# Patient Record
Sex: FEMALE | Race: WHITE | NOT HISPANIC OR LATINO | Employment: OTHER | URBAN - METROPOLITAN AREA
[De-identification: names, ages, dates, MRNs, and addresses within clinical notes are randomized per-mention and may not be internally consistent; named-entity substitution may affect disease eponyms.]

---

## 2020-12-24 ENCOUNTER — APPOINTMENT (EMERGENCY)
Dept: CT IMAGING | Facility: HOSPITAL | Age: 80
DRG: 871 | End: 2020-12-24
Payer: MEDICARE

## 2020-12-24 ENCOUNTER — APPOINTMENT (EMERGENCY)
Dept: RADIOLOGY | Facility: HOSPITAL | Age: 80
DRG: 871 | End: 2020-12-24
Payer: MEDICARE

## 2020-12-24 ENCOUNTER — HOSPITAL ENCOUNTER (INPATIENT)
Facility: HOSPITAL | Age: 80
LOS: 12 days | Discharge: NURSING, REHAB OR LONG-TERM CARE | DRG: 871 | End: 2021-01-05
Attending: EMERGENCY MEDICINE | Admitting: INTERNAL MEDICINE
Payer: MEDICARE

## 2020-12-24 DIAGNOSIS — Z16.12 UTI DUE TO EXTENDED-SPECTRUM BETA LACTAMASE (ESBL) PRODUCING ESCHERICHIA COLI: ICD-10-CM

## 2020-12-24 DIAGNOSIS — M62.82 RHABDOMYOLYSIS: ICD-10-CM

## 2020-12-24 DIAGNOSIS — J12.82 PNEUMONIA DUE TO COVID-19 VIRUS: ICD-10-CM

## 2020-12-24 DIAGNOSIS — N17.9 AKI (ACUTE KIDNEY INJURY) (HCC): ICD-10-CM

## 2020-12-24 DIAGNOSIS — B96.29 UTI DUE TO EXTENDED-SPECTRUM BETA LACTAMASE (ESBL) PRODUCING ESCHERICHIA COLI: ICD-10-CM

## 2020-12-24 DIAGNOSIS — N17.9 ACUTE RENAL FAILURE (ARF) (HCC): ICD-10-CM

## 2020-12-24 DIAGNOSIS — E86.0 SEVERE DEHYDRATION: ICD-10-CM

## 2020-12-24 DIAGNOSIS — U07.1 COVID-19: ICD-10-CM

## 2020-12-24 DIAGNOSIS — E87.3 RESPIRATORY ALKALOSIS: Primary | ICD-10-CM

## 2020-12-24 DIAGNOSIS — N39.0 UTI DUE TO EXTENDED-SPECTRUM BETA LACTAMASE (ESBL) PRODUCING ESCHERICHIA COLI: ICD-10-CM

## 2020-12-24 DIAGNOSIS — E87.8 HYPERCHLOREMIA: ICD-10-CM

## 2020-12-24 DIAGNOSIS — R09.02 HYPOXIA: ICD-10-CM

## 2020-12-24 DIAGNOSIS — F02.80 ALZHEIMER'S DISEASE (HCC): ICD-10-CM

## 2020-12-24 DIAGNOSIS — E83.41 HYPERMAGNESEMIA: ICD-10-CM

## 2020-12-24 DIAGNOSIS — E87.0 HYPERNATREMIA: ICD-10-CM

## 2020-12-24 DIAGNOSIS — U07.1 PNEUMONIA DUE TO COVID-19 VIRUS: ICD-10-CM

## 2020-12-24 DIAGNOSIS — G30.9 ALZHEIMER'S DISEASE (HCC): ICD-10-CM

## 2020-12-24 PROBLEM — I10 HYPERTENSION: Status: ACTIVE | Noted: 2020-12-24

## 2020-12-24 PROBLEM — J96.01 ACUTE RESPIRATORY FAILURE WITH HYPOXIA (HCC): Status: ACTIVE | Noted: 2020-12-24

## 2020-12-24 PROBLEM — G93.41 METABOLIC ENCEPHALOPATHY: Status: ACTIVE | Noted: 2020-12-24

## 2020-12-24 LAB
ABO GROUP BLD: NORMAL
ABO GROUP BLD: NORMAL
ALBUMIN SERPL BCP-MCNC: 4.1 G/DL (ref 3.4–4.8)
ALP SERPL-CCNC: 62.5 U/L (ref 35–140)
ALT SERPL W P-5'-P-CCNC: 36 U/L (ref 5–54)
ANION GAP SERPL CALCULATED.3IONS-SCNC: 14 MMOL/L (ref 4–13)
ANION GAP SERPL CALCULATED.3IONS-SCNC: 17 MMOL/L (ref 4–13)
AST SERPL W P-5'-P-CCNC: 49 U/L (ref 15–41)
BASE EXCESS BLDA CALC-SCNC: 0 MMOL/L (ref -2–3)
BASOPHILS # BLD MANUAL: 0 THOUSAND/UL (ref 0–0.1)
BASOPHILS NFR MAR MANUAL: 0 % (ref 0–1)
BILIRUB SERPL-MCNC: 0.49 MG/DL (ref 0.3–1.2)
BLD GP AB SCN SERPL QL: NEGATIVE
BNP SERPL-MCNC: 22.8 PG/ML (ref 1–100)
BUN SERPL-MCNC: 178 MG/DL (ref 6–20)
BUN SERPL-MCNC: 182 MG/DL (ref 6–20)
CA-I BLD-SCNC: 1.03 MMOL/L (ref 1.12–1.32)
CALCIUM SERPL-MCNC: 8.6 MG/DL (ref 8.4–10.2)
CALCIUM SERPL-MCNC: 9.4 MG/DL (ref 8.4–10.2)
CHLORIDE SERPL-SCNC: 111 MMOL/L (ref 96–108)
CHLORIDE SERPL-SCNC: 116 MMOL/L (ref 96–108)
CK MB SERPL-MCNC: 2.9 NG/ML (ref 0.1–5.9)
CK MB SERPL-MCNC: <1 % (ref 0–2.5)
CK SERPL-CCNC: 1186.2 U/L (ref 38–234)
CO2 SERPL-SCNC: 24 MMOL/L (ref 22–33)
CO2 SERPL-SCNC: 24 MMOL/L (ref 22–33)
CREAT SERPL-MCNC: 3.93 MG/DL (ref 0.4–1.1)
CREAT SERPL-MCNC: 4.2 MG/DL (ref 0.4–1.1)
CRP SERPL QL: 20 MG/L (ref 0–1)
D DIMER PPP FEU-MCNC: 11.92 MG/L FEU (ref 0.19–0.49)
EOSINOPHIL # BLD MANUAL: 0 THOUSAND/UL (ref 0–0.4)
EOSINOPHIL NFR BLD MANUAL: 0 % (ref 0–6)
ERYTHROCYTE [DISTWIDTH] IN BLOOD BY AUTOMATED COUNT: 14.8 % (ref 11.6–15.1)
GFR SERPL CREATININE-BSD FRML MDRD: 10 ML/MIN/1.73SQ M
GFR SERPL CREATININE-BSD FRML MDRD: 10 ML/MIN/1.73SQ M
GLUCOSE SERPL-MCNC: 122 MG/DL (ref 65–140)
GLUCOSE SERPL-MCNC: 135 MG/DL (ref 65–140)
GLUCOSE SERPL-MCNC: 142 MG/DL (ref 65–140)
GLUCOSE SERPL-MCNC: 143 MG/DL (ref 65–140)
HCO3 BLDA-SCNC: 23.2 MMOL/L (ref 24–30)
HCT VFR BLD AUTO: 51.6 % (ref 34.8–46.1)
HCT VFR BLD CALC: 48 % (ref 34.8–46.1)
HGB BLD-MCNC: 16.1 G/DL (ref 11.5–15.4)
HGB BLDA-MCNC: 16.3 G/DL (ref 11.5–15.4)
LACTATE SERPL-SCNC: 1.4 MMOL/L (ref 0–2)
LYMPHOCYTES # BLD AUTO: 0.9 THOUSAND/UL (ref 0.6–4.47)
LYMPHOCYTES # BLD AUTO: 10 % (ref 14–44)
MAGNESIUM SERPL-MCNC: 4.2 MG/DL (ref 1.6–2.6)
MCH RBC QN AUTO: 27 PG (ref 26.8–34.3)
MCHC RBC AUTO-ENTMCNC: 31.2 G/DL (ref 31.4–37.4)
MCV RBC AUTO: 86 FL (ref 82–98)
MONOCYTES # BLD AUTO: 0.45 THOUSAND/UL (ref 0–1.22)
MONOCYTES NFR BLD: 5 % (ref 4–12)
NEUTROPHILS # BLD MANUAL: 7.64 THOUSAND/UL (ref 1.85–7.62)
NEUTS BAND NFR BLD MANUAL: 22 % (ref 0–8)
NEUTS SEG NFR BLD AUTO: 63 % (ref 43–75)
PCO2 BLD: 24 MMOL/L (ref 21–32)
PCO2 BLD: 34.2 MM HG (ref 42–50)
PH BLD: 7.44 [PH] (ref 7.3–7.4)
PLATELET # BLD AUTO: 392 THOUSANDS/UL (ref 149–390)
PLATELET BLD QL SMEAR: ABNORMAL
PMV BLD AUTO: 10.5 FL (ref 8.9–12.7)
PO2 BLD: 40 MM HG (ref 35–45)
POTASSIUM BLD-SCNC: 5.1 MMOL/L (ref 3.5–5.3)
POTASSIUM SERPL-SCNC: 4.3 MMOL/L (ref 3.5–5)
POTASSIUM SERPL-SCNC: 4.5 MMOL/L (ref 3.5–5)
PROT SERPL-MCNC: 9.1 G/DL (ref 6.4–8.3)
RBC # BLD AUTO: 5.97 MILLION/UL (ref 3.81–5.12)
RBC MORPH BLD: NORMAL
RH BLD: POSITIVE
RH BLD: POSITIVE
SAO2 % BLD FROM PO2: 78 % (ref 60–85)
SODIUM BLD-SCNC: 151 MMOL/L (ref 136–145)
SODIUM SERPL-SCNC: 152 MMOL/L (ref 133–145)
SODIUM SERPL-SCNC: 154 MMOL/L (ref 133–145)
SPECIMEN EXPIRATION DATE: NORMAL
SPECIMEN SOURCE: ABNORMAL
TOTAL CELLS COUNTED SPEC: 100
TOXIC GRANULES BLD QL SMEAR: PRESENT
TROPONIN I SERPL-MCNC: 0.06 NG/ML (ref 0–0.07)
WBC # BLD AUTO: 8.99 THOUSAND/UL (ref 4.31–10.16)

## 2020-12-24 PROCEDURE — 81001 URINALYSIS AUTO W/SCOPE: CPT | Performed by: INTERNAL MEDICINE

## 2020-12-24 PROCEDURE — 82550 ASSAY OF CK (CPK): CPT | Performed by: PHYSICIAN ASSISTANT

## 2020-12-24 PROCEDURE — 93005 ELECTROCARDIOGRAM TRACING: CPT

## 2020-12-24 PROCEDURE — G1004 CDSM NDSC: HCPCS

## 2020-12-24 PROCEDURE — 85379 FIBRIN DEGRADATION QUANT: CPT | Performed by: INTERNAL MEDICINE

## 2020-12-24 PROCEDURE — 85014 HEMATOCRIT: CPT

## 2020-12-24 PROCEDURE — 86140 C-REACTIVE PROTEIN: CPT | Performed by: PHYSICIAN ASSISTANT

## 2020-12-24 PROCEDURE — 85027 COMPLETE CBC AUTOMATED: CPT | Performed by: PHYSICIAN ASSISTANT

## 2020-12-24 PROCEDURE — 86850 RBC ANTIBODY SCREEN: CPT | Performed by: INTERNAL MEDICINE

## 2020-12-24 PROCEDURE — 80048 BASIC METABOLIC PNL TOTAL CA: CPT | Performed by: INTERNAL MEDICINE

## 2020-12-24 PROCEDURE — 71045 X-RAY EXAM CHEST 1 VIEW: CPT

## 2020-12-24 PROCEDURE — 82948 REAGENT STRIP/BLOOD GLUCOSE: CPT

## 2020-12-24 PROCEDURE — 80053 COMPREHEN METABOLIC PANEL: CPT | Performed by: PHYSICIAN ASSISTANT

## 2020-12-24 PROCEDURE — 99285 EMERGENCY DEPT VISIT HI MDM: CPT

## 2020-12-24 PROCEDURE — 83735 ASSAY OF MAGNESIUM: CPT | Performed by: PHYSICIAN ASSISTANT

## 2020-12-24 PROCEDURE — 96360 HYDRATION IV INFUSION INIT: CPT

## 2020-12-24 PROCEDURE — 82330 ASSAY OF CALCIUM: CPT

## 2020-12-24 PROCEDURE — XW13325 TRANSFUSION OF CONVALESCENT PLASMA (NONAUTOLOGOUS) INTO PERIPHERAL VEIN, PERCUTANEOUS APPROACH, NEW TECHNOLOGY GROUP 5: ICD-10-PCS | Performed by: INTERNAL MEDICINE

## 2020-12-24 PROCEDURE — 84132 ASSAY OF SERUM POTASSIUM: CPT

## 2020-12-24 PROCEDURE — 85007 BL SMEAR W/DIFF WBC COUNT: CPT | Performed by: PHYSICIAN ASSISTANT

## 2020-12-24 PROCEDURE — 82607 VITAMIN B-12: CPT | Performed by: INTERNAL MEDICINE

## 2020-12-24 PROCEDURE — 84484 ASSAY OF TROPONIN QUANT: CPT | Performed by: PHYSICIAN ASSISTANT

## 2020-12-24 PROCEDURE — 36415 COLL VENOUS BLD VENIPUNCTURE: CPT | Performed by: PHYSICIAN ASSISTANT

## 2020-12-24 PROCEDURE — 70450 CT HEAD/BRAIN W/O DYE: CPT

## 2020-12-24 PROCEDURE — 84295 ASSAY OF SERUM SODIUM: CPT

## 2020-12-24 PROCEDURE — 82803 BLOOD GASES ANY COMBINATION: CPT

## 2020-12-24 PROCEDURE — 82553 CREATINE MB FRACTION: CPT | Performed by: PHYSICIAN ASSISTANT

## 2020-12-24 PROCEDURE — 87040 BLOOD CULTURE FOR BACTERIA: CPT | Performed by: PHYSICIAN ASSISTANT

## 2020-12-24 PROCEDURE — 86901 BLOOD TYPING SEROLOGIC RH(D): CPT | Performed by: INTERNAL MEDICINE

## 2020-12-24 PROCEDURE — 86900 BLOOD TYPING SEROLOGIC ABO: CPT | Performed by: INTERNAL MEDICINE

## 2020-12-24 PROCEDURE — 83880 ASSAY OF NATRIURETIC PEPTIDE: CPT | Performed by: PHYSICIAN ASSISTANT

## 2020-12-24 PROCEDURE — 87081 CULTURE SCREEN ONLY: CPT | Performed by: INTERNAL MEDICINE

## 2020-12-24 PROCEDURE — 96361 HYDRATE IV INFUSION ADD-ON: CPT

## 2020-12-24 PROCEDURE — 99285 EMERGENCY DEPT VISIT HI MDM: CPT | Performed by: PHYSICIAN ASSISTANT

## 2020-12-24 PROCEDURE — 99223 1ST HOSP IP/OBS HIGH 75: CPT | Performed by: INTERNAL MEDICINE

## 2020-12-24 PROCEDURE — 82947 ASSAY GLUCOSE BLOOD QUANT: CPT

## 2020-12-24 PROCEDURE — 83605 ASSAY OF LACTIC ACID: CPT | Performed by: PHYSICIAN ASSISTANT

## 2020-12-24 RX ORDER — DEXAMETHASONE SODIUM PHOSPHATE 4 MG/ML
6 INJECTION, SOLUTION INTRA-ARTICULAR; INTRALESIONAL; INTRAMUSCULAR; INTRAVENOUS; SOFT TISSUE EVERY 24 HOURS
Status: COMPLETED | OUTPATIENT
Start: 2020-12-24 | End: 2021-01-02

## 2020-12-24 RX ORDER — MULTIVITAMIN/IRON/FOLIC ACID 18MG-0.4MG
1 TABLET ORAL DAILY
Status: DISCONTINUED | OUTPATIENT
Start: 2020-12-31 | End: 2021-01-05 | Stop reason: HOSPADM

## 2020-12-24 RX ORDER — ZINC SULFATE 50(220)MG
220 CAPSULE ORAL DAILY
Status: DISPENSED | OUTPATIENT
Start: 2020-12-24 | End: 2020-12-31

## 2020-12-24 RX ORDER — ONDANSETRON 2 MG/ML
4 INJECTION INTRAMUSCULAR; INTRAVENOUS EVERY 6 HOURS PRN
Status: DISCONTINUED | OUTPATIENT
Start: 2020-12-24 | End: 2021-01-05 | Stop reason: HOSPADM

## 2020-12-24 RX ORDER — FAMOTIDINE 20 MG/1
20 TABLET, FILM COATED ORAL 2 TIMES DAILY
Status: DISCONTINUED | OUTPATIENT
Start: 2020-12-24 | End: 2021-01-05 | Stop reason: HOSPADM

## 2020-12-24 RX ORDER — MAGNESIUM HYDROXIDE/ALUMINUM HYDROXICE/SIMETHICONE 120; 1200; 1200 MG/30ML; MG/30ML; MG/30ML
30 SUSPENSION ORAL EVERY 6 HOURS PRN
Status: DISCONTINUED | OUTPATIENT
Start: 2020-12-24 | End: 2021-01-05 | Stop reason: HOSPADM

## 2020-12-24 RX ORDER — ACETAMINOPHEN 650 MG/1
650 SUPPOSITORY RECTAL ONCE
Status: COMPLETED | OUTPATIENT
Start: 2020-12-24 | End: 2020-12-24

## 2020-12-24 RX ORDER — DEXTROSE MONOHYDRATE 50 MG/ML
200 INJECTION, SOLUTION INTRAVENOUS CONTINUOUS
Status: DISCONTINUED | OUTPATIENT
Start: 2020-12-24 | End: 2020-12-25

## 2020-12-24 RX ORDER — BISACODYL 10 MG
10 SUPPOSITORY, RECTAL RECTAL DAILY PRN
Status: DISCONTINUED | OUTPATIENT
Start: 2020-12-24 | End: 2021-01-05 | Stop reason: HOSPADM

## 2020-12-24 RX ORDER — ATORVASTATIN CALCIUM 40 MG/1
40 TABLET, FILM COATED ORAL
Status: DISCONTINUED | OUTPATIENT
Start: 2020-12-24 | End: 2020-12-25

## 2020-12-24 RX ORDER — CEFTRIAXONE 1 G/50ML
1000 INJECTION, SOLUTION INTRAVENOUS EVERY 24 HOURS
Status: DISCONTINUED | OUTPATIENT
Start: 2020-12-24 | End: 2020-12-28

## 2020-12-24 RX ORDER — SODIUM CHLORIDE 9 MG/ML
125 INJECTION, SOLUTION INTRAVENOUS CONTINUOUS
Status: DISCONTINUED | OUTPATIENT
Start: 2020-12-24 | End: 2020-12-25

## 2020-12-24 RX ORDER — HEPARIN SODIUM 5000 [USP'U]/ML
5000 INJECTION, SOLUTION INTRAVENOUS; SUBCUTANEOUS EVERY 8 HOURS SCHEDULED
Status: DISCONTINUED | OUTPATIENT
Start: 2020-12-24 | End: 2020-12-29

## 2020-12-24 RX ORDER — ACETAMINOPHEN 325 MG/1
650 TABLET ORAL EVERY 6 HOURS PRN
Status: DISCONTINUED | OUTPATIENT
Start: 2020-12-24 | End: 2021-01-05 | Stop reason: HOSPADM

## 2020-12-24 RX ORDER — DOXYCYCLINE HYCLATE 100 MG/1
100 CAPSULE ORAL EVERY 12 HOURS
Status: DISCONTINUED | OUTPATIENT
Start: 2020-12-24 | End: 2020-12-26

## 2020-12-24 RX ORDER — MELATONIN
2000 DAILY
Status: DISCONTINUED | OUTPATIENT
Start: 2020-12-24 | End: 2021-01-05 | Stop reason: HOSPADM

## 2020-12-24 RX ORDER — ASCORBIC ACID 500 MG
1000 TABLET ORAL EVERY 12 HOURS SCHEDULED
Status: DISPENSED | OUTPATIENT
Start: 2020-12-24 | End: 2020-12-31

## 2020-12-24 RX ADMIN — HEPARIN SODIUM 5000 UNITS: 5000 INJECTION INTRAVENOUS; SUBCUTANEOUS at 18:29

## 2020-12-24 RX ADMIN — SODIUM CHLORIDE 1000 ML: 0.9 INJECTION, SOLUTION INTRAVENOUS at 12:51

## 2020-12-24 RX ADMIN — ACETAMINOPHEN 650 MG: 650 SUPPOSITORY RECTAL at 13:34

## 2020-12-24 RX ADMIN — SODIUM CHLORIDE 125 ML/HR: 0.9 INJECTION, SOLUTION INTRAVENOUS at 13:36

## 2020-12-24 RX ADMIN — DEXTROSE 125 ML/HR: 5 SOLUTION INTRAVENOUS at 18:29

## 2020-12-24 RX ADMIN — DEXAMETHASONE SODIUM PHOSPHATE 6 MG: 4 INJECTION, SOLUTION INTRAMUSCULAR; INTRAVENOUS at 18:29

## 2020-12-24 RX ADMIN — CEFTRIAXONE 1000 MG: 1 INJECTION, SOLUTION INTRAVENOUS at 18:30

## 2020-12-24 NOTE — ASSESSMENT & PLAN NOTE
Sodium is 152 secondary to dehydration  Continue IV fluids D5 water at 125 cc/hour  Free water deficit is around 3 L

## 2020-12-24 NOTE — ASSESSMENT & PLAN NOTE
Nursing home staff told that patient has not been eating for couple of days  She is not drinking enough  Patient has no previous known kidney disease  Today creatinine is 4  Continue IV fluids  Check PVR  Follow urinalysis

## 2020-12-24 NOTE — H&P
H&P- Onelia Samuels 1940, 68 y o  female MRN: [id number]    Unit/Bed#: ED 12 Encounter: [id number]    Primary Care Provider: No primary care provider on file  Date and time admitted to hospital: 12/24/2020 12:01 PM    Acute respiratory failure with hypoxia [facility])  Assessment & Plan  Patient got diagnosed with COVID-19 on December 14, 2020  After that she was started on multivitamins and Decadron in [facility]  Patient got worse over time  For past few days she is not eating or drinking anything  Her oxygen saturations dropped  That said patient was sent to the hospital   Patient does not use oxygen at baseline  Now she is requiring 6 L of oxygen to maintain saturation  Patient is in acute hypoxic respiratory failure secondary to COVID-19 pneumonia  * Pneumonia due to COVID-19 virus  Assessment & Plan  Patient was found COVID positive on December 14, 2020  She was started with Decadron and multivitamins at nursing home  Her conditions got worse  Today she was found to have low oxygen saturation  Nursing home staff told that patient is not eating or drinking anything for couple of days  Chest x-ray is evident for pneumonia  Patient is requiring 6 L of oxygen  Will start on moderate COVID-19 positive  Start on antibiotics, anticoagulation, steroids, multivitamins  Patient has severe LARA  Hold remdisivir  Self Proning  We talked to son in detail, he want patient to be full code  Continue to monitor closely      LARA (acute kidney injury) [facility])  Assessment & Plan  Nursing home staff told that patient has not been eating for couple of days  She is not drinking enough  Patient has no previous known kidney disease  Today creatinine is 4  Continue IV fluids  Check PVR  Follow urinalysis  Hypernatremia  Assessment & Plan  Sodium is 152 secondary to dehydration  Continue IV fluids D5 water at 125 cc/hour  Free water deficit is around 3 L       Rhabdomyolysis  Assessment & Plan  Patient had elevated CK 1186  Continue IV fluids  Metabolic encephalopathy  Assessment & Plan  Patient has hypernatremia, acute kidney injury, leading to metabolic encephalopathy  At baseline patient is alert awake oriented x3  Today patient is nonverbal, she is awake but disoriented  CT of the head is negative  Alzheimer's disease [facility])  Assessment & Plan  Patient has baseline dementia  But alert awake oriented x3 at baseline  She walks normal, she can feed herself  Continue dementia care  Hypertension  Assessment & Plan  Patient has history of hypertension, she takes losartan  VTE Prophylaxis: Heparin  / sequential compression device   Code Status:  DNR DNI    Anticipated Length of Stay:  Patient will be admitted on an Inpatient basis with an anticipated length of stay of   2 midnights  Justification for Hospital Stay:  COVID-19 pneumonia and acute hypoxic respiratory failure    Total Time for Visit, including Counseling / Coordination of Care: 1 hour  Greater than 50% of this total time spent on direct patient counseling and coordination of care  Chief Complaint:   Decreased oxygen saturation, generalized weakness, lethargic, nonverbal     History of Present Illness:    Magalie Guido is a 68 y o  female with past medical history of dementia, hypertension and recently diagnosed COVID infection presented to emergency department with abnormal labs and decreased oxygen saturation  Patient was diagnosed with COVID-19 on December 14, 2020  After that patient was started on oral Decadron and mild positive for COVID-19 in nursing home  Her condition got worse over days  Patient stopped eating and drinking  Today her oxygen saturations dropped to 77%  Patient is not saturating 94% on 6 L of oxygen via nasal cannula  She is nonverbal   She has elevated sodium, elevated CK  No leukocytosis, fever 101  Chest x-ray is evident for bilateral pneumonia    EKG pending    Review of Systems:    Review of Systems    Past Medical and Surgical History:     Past Medical History:   Diagnosis Date    Alzheimer's disease ([facility] [address] )     Hypertension        History reviewed  No pertinent surgical history  Meds/Allergies:    Prior to Admission medications    Not on File     I have reviewed home medications with patient personally  Allergies: No Known Allergies    Social History:     Marital Status: Single   Substance Use History:   Social History     Substance and Sexual Activity   Alcohol Use Not Currently     Social History     Tobacco Use   Smoking Status Never Smoker   Smokeless Tobacco Never Used     Social History     Substance and Sexual Activity   Drug Use Not Currently       Family History:    non-contributory    Physical Exam:     Vitals:   Blood Pressure: 125/73 (12/24/20 1358)  Pulse: 95 (12/24/20 1358)  Temperature: (!) 101 6 °F (38 7 °C) (12/24/20 1308)  Temp Source: Rectal (12/24/20 1308)  Respirations: 21 (12/24/20 1358)  SpO2: 100 % (12/24/20 1358)    Physical Exam  Vitals signs and nursing note reviewed  Constitutional:       General: She is in acute distress  Appearance: She is well-developed  She is ill-appearing  HENT:      Head: Normocephalic and atraumatic  Mouth/Throat:      Mouth: Mucous membranes are dry  Eyes:      Conjunctiva/sclera: Conjunctivae normal    Neck:      Musculoskeletal: Neck supple  Cardiovascular:      Rate and Rhythm: Normal rate and regular rhythm  Heart sounds: No murmur  Pulmonary:      Effort: Respiratory distress present  Breath sounds: Normal breath sounds  Decreased air movement present  Abdominal:      Palpations: Abdomen is soft  Tenderness: There is no abdominal tenderness  Musculoskeletal:      Comments: Tender all over   Skin:     General: Skin is warm and dry  Capillary Refill: Capillary refill takes less than 2 seconds  Neurological:      General: No focal deficit present        Mental Status: She is alert  She is disoriented  Comments: Patient is moving extremities spontaneously  CT head negative  Psychiatric:      Comments: Not appropriate         Additional Data:     Lab Results: I have personally reviewed pertinent reports  Results from last 7 days   Lab Units 12/24/20  1307 12/24/20  1247   WBC Thousand/uL  --  8 99   HEMOGLOBIN g/dL  --  16 1*   I STAT HEMOGLOBIN g/dl 16 3*  --    HEMATOCRIT %  --  51 6*   HEMATOCRIT, ISTAT % 48*  --    PLATELETS Thousands/uL  --  392*   BANDS PCT %  --  22*   LYMPHO PCT %  --  10*   MONO PCT %  --  5   EOS PCT %  --  0     Results from last 7 days   Lab Units 12/24/20  1307 12/24/20  1247   SODIUM mmol/L  --  152*   POTASSIUM mmol/L  --  4 5   CHLORIDE mmol/L  --  111*   CO2 mmol/L  --  24   CO2, I-STAT mmol/L 24  --    BUN mg/dL  --  182*   CREATININE mg/dL  --  4 20*   ANION GAP mmol/L  --  17*   CALCIUM mg/dL  --  9 4   ALBUMIN g/dL  --  4 1   TOTAL BILIRUBIN mg/dL  --  0 49   ALK PHOS U/L  --  62 5   ALT U/L  --  36   AST U/L  --  49*   GLUCOSE RANDOM mg/dL  --  142*         Results from last 7 days   Lab Units 12/24/20  1305   POC GLUCOSE mg/dl 143*         Results from last 7 days   Lab Units 12/24/20  1247   LACTIC ACID mmol/L 1 4       Imaging: I have personally reviewed pertinent reports  CT head without contrast   Final Result by Kiko Li DO (12/24 1447)      No acute intracranial abnormality  Workstation performed: [id number]         XR chest 1 view portable   Final Result by Ahmet Tao DO (12/24 1320)      Patchy airspace opacities bilaterally compatible with multilobar pneumonia  Workstation performed: [id number]             EKG, Pathology, and Other Studies Reviewed on Admission:   · EKG:  Pending    Allscripts / Epic Records Reviewed: Yes     ** Please Note: This note has been constructed using a voice recognition system   **

## 2020-12-24 NOTE — ASSESSMENT & PLAN NOTE
Patient got diagnosed with COVID-19 on December 14, 2020  After that she was [facility]  Patient got worse over time  For past few days she is not eating or drinking anything  Her oxygen saturations dropped  That said patient was sent to the hospital   Patient does not use oxygen at baseline  Now she is requiring 6 L of oxygen to maintain saturation  Patient is in acute hypoxic respiratory failure secondary to COVID-19 pneumonia

## 2020-12-24 NOTE — ASSESSMENT & PLAN NOTE
Patient was found COVID positive on December 14, 2020  She was started with Decadron and multivitamins at nursing home  Her conditions got worse  Today she was found to have low oxygen saturation  Nursing home staff told that patient is not eating or drinking anything for couple of days  Chest x-ray is evident for pneumonia  Patient is requiring 6 L of oxygen  Will start on moderate COVID-19 positive  Start on antibiotics, anticoagulation, steroids, multivitamins  Patient has severe LARA  Hold remdisivir  Self Proning  We talked to son in detail, he want patient to be full code    Continue to monitor closely

## 2020-12-24 NOTE — ASSESSMENT & PLAN NOTE
Patient has hypernatremia, acute kidney injury, leading to metabolic encephalopathy  At baseline patient is alert awake oriented x3  Today patient is nonverbal, she is awake but disoriented  CT of the head is negative

## 2020-12-24 NOTE — ASSESSMENT & PLAN NOTE
Patient has baseline dementia  But alert awake oriented x3 at baseline  She walks normal, she can feed herself  Continue dementia care

## 2020-12-24 NOTE — ED PROVIDER NOTES
History  Chief Complaint   Patient presents with    Decreased Oxygen Level     Pt known COVID positive from [facility]  Staff found her with decreased O2 in the 70's  Sat went up with nasal cannula O2  Pt also has elevated BUN     74-year-old demented female patient who had COVID diagnosed on 12/14 as the onset was sent in from the nursing home for evaluation of hypoxia and elevated BUN  She apparently was found to have an O2 sat of 70  Pt unable to provide hx  Hx provided by nursing home staff  None       Past Medical History:   Diagnosis Date    Alzheimer's disease ([facility] [address] )     Hypertension        History reviewed  No pertinent surgical history  History reviewed  No pertinent family history  I have reviewed and agree with the history as documented  E-Cigarette/Vaping     E-Cigarette/Vaping Substances     Social History     Tobacco Use    Smoking status: Never Smoker    Smokeless tobacco: Never Used   Substance Use Topics    Alcohol use: Not Currently    Drug use: Not Currently       Review of Systems   Unable to perform ROS: Patient nonverbal       Physical Exam  Physical Exam  Vitals signs and nursing note reviewed  Constitutional:       General: She is awake  She is in acute distress  Appearance: She is cachectic  She is ill-appearing  Comments: Non-verbal   HENT:      Head: Normocephalic and atraumatic  Mouth/Throat:      Mouth: Mucous membranes are dry  Eyes:      Extraocular Movements: Extraocular movements intact  Neck:      Musculoskeletal: Normal range of motion  Cardiovascular:      Rate and Rhythm: Normal rate and regular rhythm  Pulmonary:      Breath sounds: Wheezing and rales present  Comments: Hypoxic of 2L NC, but patient is mouth breathing  Put on non-rebreather until we can find simple mask  Abdominal:      General: Abdomen is flat  Bowel sounds are normal       Palpations: Abdomen is soft     Musculoskeletal:      Comments: Ranges BUE well Skin:     General: Skin is warm and dry     Neurological:      Comments: Non-verbal, not following directions   Psychiatric:      Comments: Unable to assess         Vital Signs  ED Triage Vitals   Temperature Pulse Respirations Blood Pressure SpO2   12/24/20 1308 12/24/20 1207 12/24/20 1207 12/24/20 1207 12/24/20 1207   (!) 101 6 °F (38 7 °C) (!) 113 20 108/65 93 %      Temp Source Heart Rate Source Patient Position - Orthostatic VS BP Location FiO2 (%)   12/24/20 1308 12/24/20 1207 12/24/20 1207 12/24/20 1207 --   Rectal Monitor Lying Left arm       Pain Score       12/24/20 1334       Med Not Given for Pain - for MAR use only           Vitals:    12/26/20 0059 12/26/20 0114 12/26/20 0129 12/26/20 0828   BP: 121/65 138/51 114/77 105/72   Pulse: 74 74 65 59   Patient Position - Orthostatic VS:    Lying         Visual Acuity      ED Medications  Medications   acetaminophen (TYLENOL) tablet 650 mg (has no administration in time range)   bisacodyl (DULCOLAX) rectal suppository 10 mg (has no administration in time range)   ondansetron (ZOFRAN) injection 4 mg (has no administration in time range)   aluminum-magnesium hydroxide-simethicone (MYLANTA) oral suspension 30 mL (has no administration in time range)   cholecalciferol (VITAMIN D3) tablet 2,000 Units (2,000 Units Oral Not Given 12/26/20 0826)   cefTRIAXone (ROCEPHIN) IVPB (premix in dextrose) 1,000 mg 50 mL (1,000 mg Intravenous New Bag 12/25/20 1539)   doxycycline hyclate (VIBRAMYCIN) capsule 100 mg (100 mg Oral Refused 12/26/20 0346)   ascorbic acid (VITAMIN C) tablet 1,000 mg (1,000 mg Oral Refused 12/26/20 0826)   zinc sulfate (ZINCATE) capsule 220 mg (220 mg Oral Not Given 12/26/20 0826)     Followed by   multivitamin-minerals (CENTRUM ADULTS) tablet 1 tablet (has no administration in time range)   dexamethasone (DECADRON) injection 6 mg (6 mg Intravenous Given 12/25/20 1609)   famotidine (PEPCID) tablet 20 mg (20 mg Oral Not Given 12/26/20 1016)   heparin (porcine) subcutaneous injection 5,000 Units (5,000 Units Subcutaneous Given 12/26/20 0612)   dextrose 5 % infusion (has no administration in time range)   mirtazapine (REMERON) tablet 7 5 mg (has no administration in time range)   risperiDONE (RisperDAL) tablet 0 25 mg (has no administration in time range)   haloperidol lactate (HALDOL) injection 2 mg (has no administration in time range)   sodium chloride 0 9 % bolus 1,000 mL (0 mL Intravenous Stopped 12/24/20 1344)   acetaminophen (TYLENOL) rectal suppository 650 mg (650 mg Rectal Given 12/24/20 1334)   LORazepam (ATIVAN) injection 2 mg (2 mg Intramuscular Given 12/26/20 1028)       Diagnostic Studies  Results Reviewed     Procedure Component Value Units Date/Time    Blood culture #1 [[id number]] Collected: 12/24/20 1247    Lab Status: Preliminary result Specimen: Blood from Arm, Left Updated: 12/25/20 1501     Blood Culture No Growth at 24 hrs  Blood culture #2 [[id number]] Collected: 12/24/20 1247    Lab Status: Preliminary result Specimen: Blood from Arm, Left Updated: 12/25/20 1501     Blood Culture No Growth at 24 hrs  CBC and differential [[id number]]  (Abnormal) Collected: 12/25/20 0654    Lab Status: Final result Specimen: Blood from Arm, Left Updated: 12/25/20 1014     WBC 6 31 Thousand/uL      RBC 5 17 Million/uL      Hemoglobin 14 1 g/dL      Hematocrit 45 8 %      MCV 89 fL      MCH 27 3 pg      MCHC 30 8 g/dL      RDW 14 8 %      MPV 10 5 fL      Platelets 788 Thousands/uL     Narrative: This is an appended report  These results have been appended to a previously verified report      Comprehensive metabolic panel [[id number]]  (Abnormal) Collected: 12/25/20 0654    Lab Status: Final result Specimen: Blood from Arm, Left Updated: 12/25/20 0829     Sodium 152 mmol/L      Potassium 4 7 mmol/L      Chloride 114 mmol/L      CO2 24 mmol/L      ANION GAP 14 mmol/L       mg/dL      Creatinine 3 08 mg/dL      Glucose 200 mg/dL      Calcium 9 1 mg/dL      AST 37 U/L      ALT 28 U/L      Alkaline Phosphatase 52 2 U/L      Total Protein 8 0 g/dL      Albumin 3 6 g/dL      Total Bilirubin 0 41 mg/dL      eGFR 14 ml/min/1 73sq m     Narrative:      [place] guidelines for Chronic Kidney Disease (CKD):     Stage 1 with normal or high GFR (GFR > 90 mL/min/1 73 square meters)    Stage 2 Mild CKD (GFR = 60-89 mL/min/1 73 square meters)    Stage 3A Moderate CKD (GFR = 45-59 mL/min/1 73 square meters)    Stage 3B Moderate CKD (GFR = 30-44 mL/min/1 73 square meters)    Stage 4 Severe CKD (GFR = 15-29 mL/min/1 73 square meters)    Stage 5 End Stage CKD (GFR <15 mL/min/1 73 square meters)  Note: GFR calculation is accurate only with a steady state creatinine    D-dimer, quantitative [[id number]]  (Abnormal) Collected: 12/25/20 0654    Lab Status: Final result Specimen: Blood from Arm, Left Updated: 12/25/20 0757     D-Dimer, Quant  9 09 mg/L FEU     Urinalysis with microscopic [[id number]]  (Abnormal) Collected: 12/24/20 2334    Lab Status: Final result Specimen: Urine, Indwelling Davis Catheter Updated: 12/25/20 0227     Clarity, UA Slightly Cloudy     Color, UA Yellow     Specific Gravity, UA 1 025     pH, UA 5 5     Glucose, UA Negative mg/dl      Ketones, UA Negative mg/dl      Blood, UA 2+     Protein, UA 1+ mg/dl      Nitrite, UA Positive     Bilirubin, UA Negative     Urobilinogen, UA 0 2 E U /dl      Leukocytes, UA 2+     WBC, UA Innumerable /hpf      RBC, UA 10-20 /hpf      Bacteria, UA Innumerable /hpf      AMORPH PHOSPATES Occasional /hpf      Epithelial Cells Moderate /hpf     MRSA culture [[id number]] Collected: 12/24/20 2334    Lab Status:  In process Specimen: Nares from Nose Updated: 12/25/20 0156    Vitamin B12 [[id number]]  (Abnormal) Collected: 12/24/20 1247    Lab Status: Final result Specimen: Blood from Arm, Left Updated: 12/25/20 0132     Vitamin B-12 1,075 pg/mL     D-dimer, quantitative [[id number]]  (Abnormal) Collected: 12/24/20 1842    Lab Status: Final result Specimen: Blood from Arm, Right Updated: 12/24/20 2036     D-Dimer, Quant  11 92 mg/L FEU     Basic metabolic panel [[id number]]  (Abnormal) Collected: 12/24/20 1842    Lab Status: Final result Specimen: Blood from Arm, Right Updated: 12/24/20 1943     Sodium 154 mmol/L      Potassium 4 3 mmol/L      Chloride 116 mmol/L      CO2 24 mmol/L      ANION GAP 14 mmol/L       mg/dL      Creatinine 3 93 mg/dL      Glucose 122 mg/dL      Calcium 8 6 mg/dL      eGFR 10 ml/min/1 73sq m     Narrative:      [place] guidelines for Chronic Kidney Disease (CKD):     Stage 1 with normal or high GFR (GFR > 90 mL/min/1 73 square meters)    Stage 2 Mild CKD (GFR = 60-89 mL/min/1 73 square meters)    Stage 3A Moderate CKD (GFR = 45-59 mL/min/1 73 square meters)    Stage 3B Moderate CKD (GFR = 30-44 mL/min/1 73 square meters)    Stage 4 Severe CKD (GFR = 15-29 mL/min/1 73 square meters)    Stage 5 End Stage CKD (GFR <15 mL/min/1 73 square meters)  Note: GFR calculation is accurate only with a steady state creatinine    CKMB [[id number]]  (Normal) Collected: 12/24/20 1247    Lab Status: Final result Specimen: Blood from Arm, Left Updated: 12/24/20 1356     CK-MB Index <1 0 %      CK-MB 2 9 ng/mL     Manual Differential(PHLEBS Do Not Order) [[id number]]  (Abnormal) Collected: 12/24/20 1247    Lab Status: Final result Specimen: Blood from Arm, Left Updated: 12/24/20 1355     Segmented % 63 %      Bands % 22 %      Lymphocytes % 10 %      Monocytes % 5 %      Eosinophils, % 0 %      Basophils % 0 %      Absolute Neutrophils 7 64 Thousand/uL      Lymphocytes Absolute 0 90 Thousand/uL      Monocytes Absolute 0 45 Thousand/uL      Eosinophils Absolute 0 00 Thousand/uL      Basophils Absolute 0 00 Thousand/uL      Total Counted 100     Toxic Granulation Present     RBC Morphology Normal     Platelet Estimate Increased    CBC and differential [[id number]] (Abnormal) Collected: 12/24/20 1247    Lab Status: Final result Specimen: Blood from Arm, Left Updated: 12/24/20 1355     WBC 8 99 Thousand/uL      RBC 5 97 Million/uL      Hemoglobin 16 1 g/dL      Hematocrit 51 6 %      MCV 86 fL      MCH 27 0 pg      MCHC 31 2 g/dL      RDW 14 8 %      MPV 10 5 fL      Platelets 387 Thousands/uL     Narrative: This is an appended report  These results have been appended to a previously verified report      Comprehensive metabolic panel [[id number]]  (Abnormal) Collected: 12/24/20 1247    Lab Status: Final result Specimen: Blood from Arm, Left Updated: 12/24/20 1338     Sodium 152 mmol/L      Potassium 4 5 mmol/L      Chloride 111 mmol/L      CO2 24 mmol/L      ANION GAP 17 mmol/L       mg/dL      Creatinine 4 20 mg/dL      Glucose 142 mg/dL      Calcium 9 4 mg/dL      AST 49 U/L      ALT 36 U/L      Alkaline Phosphatase 62 5 U/L      Total Protein 9 1 g/dL      Albumin 4 1 g/dL      Total Bilirubin 0 49 mg/dL      eGFR 10 ml/min/1 73sq m     Narrative:      [facility] guidelines for Chronic Kidney Disease (CKD):     Stage 1 with normal or high GFR (GFR > 90 mL/min/1 73 square meters)    Stage 2 Mild CKD (GFR = 60-89 mL/min/1 73 square meters)    Stage 3A Moderate CKD (GFR = 45-59 mL/min/1 73 square meters)    Stage 3B Moderate CKD (GFR = 30-44 mL/min/1 73 square meters)    Stage 4 Severe CKD (GFR = 15-29 mL/min/1 73 square meters)    Stage 5 End Stage CKD (GFR <15 mL/min/1 73 square meters)  Note: GFR calculation is accurate only with a steady state creatinine    B-Type Natriuretic Peptide [facility] [place], operated by [facility] ONLY) [[id number]]  (Normal) Collected: 12/24/20 1247    Lab Status: Final result Specimen: Blood from Arm, Left Updated: 12/24/20 1328     BNP 22 8 pg/mL     Troponin I [[id number]]  (Normal) Collected: 12/24/20 1247    Lab Status: Final result Specimen: Blood from Arm, Left Updated: 12/24/20 1324     Troponin I 0 06 ng/mL     Lactic acid, plasma [[id number]]  (Normal) Collected: 12/24/20 1247    Lab Status: Final result Specimen: Blood from Arm, Left Updated: 12/24/20 1321     LACTIC ACID 1 4 mmol/L     Narrative:      Result may be elevated if tourniquet was used during collection  Magnesium [[id number]]  (Abnormal) Collected: 12/24/20 1247    Lab Status: Final result Specimen: Blood from Arm, Left Updated: 12/24/20 1321     Magnesium 4 2 mg/dL     C-reactive protein [[id number]]  (Abnormal) Collected: 12/24/20 1247    Lab Status: Final result Specimen: Blood from Arm, Left Updated: 12/24/20 1321     CRP 20 0 mg/L     CK (with reflex to MB) [[id number]]  (Abnormal) Collected: 12/24/20 1247    Lab Status: Final result Specimen: Blood from Arm, Left Updated: 12/24/20 1321     Total CK 1,186 2 U/L     POCT Blood Gas (CG8+) [[id number]]  (Abnormal) Collected: 12/24/20 1307    Lab Status: Final result Specimen: Venous Updated: 12/24/20 1311     ph, Jt ISTAT 7 439     pCO2, Jt i-STAT 34 2 mm HG      pO2, Jt i-STAT 40 0 mm HG      BE, i-STAT 0 mmol/L      HCO3, Jt i-STAT 23 2 mmol/L      CO2, i-STAT 24 mmol/L      O2 Sat, i-STAT 78 %      SODIUM, I-STAT 151 mmol/l      Potassium, i-STAT 5 1 mmol/L      Calcium, Ionized i-STAT 1 03 mmol/L      Hct, i-STAT 48 %      Hgb, i-STAT 16 3 g/dl      Glucose, i-STAT 135 mg/dl      Specimen Type VENOUS    Fingerstick Glucose (POCT) [[id number]]  (Abnormal) Collected: 12/24/20 1305    Lab Status: Final result Updated: 12/24/20 1306     POC Glucose 143 mg/dl                  CT head without contrast   Final Result by Randolph Carney DO (12/24 1447)      No acute intracranial abnormality  Workstation performed: [id number]         XR chest 1 view portable   Final Result by Ahmet Tao DO (12/24 1320)      Patchy airspace opacities bilaterally compatible with multilobar pneumonia                    Workstation performed: [id number]         VAS lower limb venous duplex study, complete bilateral    (Results Pending)              Procedures  Procedures         ED Course  ED Course as of Dec 26 1230   Thu Dec 24, 2020   1254 [address], [facility]-   Has not eaten today  Minimal fluids  For the  Pulse ox 77 on RA  Normally speaks, she has been non-verbal for a few days which is new  1358 Patient meets SIRS criteria, however her sepsis is due to her underlying COVID  Antibiotics are not indicated                                                MDM    Disposition  Final diagnoses:   Respiratory alkalosis   Hypermagnesemia   Rhabdomyolysis   Severe dehydration   Pneumonia due to COVID-19 virus   Hypernatremia   Acute renal failure (ARF) (HCC)   Hypoxia   Hyperchloremia     Time reflects when diagnosis was documented in both MDM as applicable and the Disposition within this note     Time User Action Codes Description Comment    12/24/2020  1:24 PM Myna Alyssa Add [E87 3] Respiratory alkalosis     12/24/2020  1:24 PM Isaiahus Lang [E83 41] Hypermagnesemia     12/24/2020  1:24 PM Myna Alyssa Add [M62 82] Rhabdomyolysis     12/24/2020  1:24 PM Oneta Epley, Fabiene Gails Add [E86 0] Severe dehydration     12/24/2020  2:13 PM Severino, Fabiene Gails Add [U07 1,  J12 89] Pneumonia due to COVID-19 virus     12/24/2020  2:13 PM Myna Alyssa Add [E87 0] Hypernatremia     12/24/2020  2:14 PM Myna Alyssa Add [N17 9] Acute renal failure (ARF) ([facility] [address] )     12/24/2020  2:14 PM Myna Alyssa Add [R09 02] Hypoxia     12/24/2020  2:14 PM Myna Alyssa Add [E87 8] Hyperchloremia     12/24/2020  3:18 PM Polly Álvarez Add [N17 9] LARA (acute kidney injury) ([facility] [address] )     12/26/2020  9:14 AM Owen Álvarez Add [G30 9,  F02 80] Alzheimer's disease [facility])       ED Disposition     ED Disposition Condition Date/Time Comment    Admit Stable Thu Dec 24, 2020  2:55 PM Case was discussed with Dr Uriel Mcclure and the patient's admission status was agreed to be Admission Status: inpatient status to the service of Dr Barahona Due   Follow-up Information    None         There are no discharge medications for this patient  No discharge procedures on file      PDMP Review     None          ED Provider  Electronically Signed by           Jose Singh PA-C  12/26/20 5373

## 2020-12-25 PROBLEM — R79.89 ELEVATED D-DIMER: Status: ACTIVE | Noted: 2020-12-25

## 2020-12-25 PROBLEM — N39.0 UTI (URINARY TRACT INFECTION): Status: ACTIVE | Noted: 2020-12-25

## 2020-12-25 LAB
ALBUMIN SERPL BCP-MCNC: 3.6 G/DL (ref 3.4–4.8)
ALP SERPL-CCNC: 52.2 U/L (ref 35–140)
ALT SERPL W P-5'-P-CCNC: 28 U/L (ref 5–54)
AMORPH PHOS CRY URNS QL MICRO: ABNORMAL /HPF
ANION GAP SERPL CALCULATED.3IONS-SCNC: 14 MMOL/L (ref 4–13)
AST SERPL W P-5'-P-CCNC: 37 U/L (ref 15–41)
ATRIAL RATE: 97 BPM
BACTERIA UR QL AUTO: ABNORMAL /HPF
BASOPHILS # BLD MANUAL: 0.06 THOUSAND/UL (ref 0–0.1)
BASOPHILS NFR MAR MANUAL: 1 % (ref 0–1)
BILIRUB SERPL-MCNC: 0.41 MG/DL (ref 0.3–1.2)
BILIRUB UR QL STRIP: NEGATIVE
BUN SERPL-MCNC: 164 MG/DL (ref 6–20)
CALCIUM SERPL-MCNC: 9.1 MG/DL (ref 8.4–10.2)
CHLORIDE SERPL-SCNC: 114 MMOL/L (ref 96–108)
CK MB SERPL-MCNC: 6 NG/ML (ref 0.1–5.9)
CK MB SERPL-MCNC: <1 % (ref 0–2.5)
CK SERPL-CCNC: 1649.4 U/L (ref 38–234)
CLARITY UR: ABNORMAL
CO2 SERPL-SCNC: 24 MMOL/L (ref 22–33)
COLOR UR: YELLOW
CREAT SERPL-MCNC: 3.08 MG/DL (ref 0.4–1.1)
D DIMER PPP FEU-MCNC: 9.09 MG/L FEU (ref 0.19–0.49)
EOSINOPHIL # BLD MANUAL: 0 THOUSAND/UL (ref 0–0.4)
EOSINOPHIL NFR BLD MANUAL: 0 % (ref 0–6)
ERYTHROCYTE [DISTWIDTH] IN BLOOD BY AUTOMATED COUNT: 14.8 % (ref 11.6–15.1)
GFR SERPL CREATININE-BSD FRML MDRD: 14 ML/MIN/1.73SQ M
GLUCOSE SERPL-MCNC: 200 MG/DL (ref 65–140)
GLUCOSE UR STRIP-MCNC: NEGATIVE MG/DL
HCT VFR BLD AUTO: 45.8 % (ref 34.8–46.1)
HGB BLD-MCNC: 14.1 G/DL (ref 11.5–15.4)
HGB UR QL STRIP.AUTO: ABNORMAL
KETONES UR STRIP-MCNC: NEGATIVE MG/DL
LEUKOCYTE ESTERASE UR QL STRIP: ABNORMAL
LYMPHOCYTES # BLD AUTO: 0.5 THOUSAND/UL (ref 0.6–4.47)
LYMPHOCYTES # BLD AUTO: 8 % (ref 14–44)
MCH RBC QN AUTO: 27.3 PG (ref 26.8–34.3)
MCHC RBC AUTO-ENTMCNC: 30.8 G/DL (ref 31.4–37.4)
MCV RBC AUTO: 89 FL (ref 82–98)
MONOCYTES # BLD AUTO: 0 THOUSAND/UL (ref 0–1.22)
MONOCYTES NFR BLD: 0 % (ref 4–12)
NEUTROPHILS # BLD MANUAL: 5.68 THOUSAND/UL (ref 1.85–7.62)
NEUTS BAND NFR BLD MANUAL: 6 % (ref 0–8)
NEUTS SEG NFR BLD AUTO: 84 % (ref 43–75)
NITRITE UR QL STRIP: POSITIVE
NON-SQ EPI CELLS URNS QL MICRO: ABNORMAL /HPF
P AXIS: 69 DEGREES
PH UR STRIP.AUTO: 5.5 [PH]
PLATELET # BLD AUTO: 351 THOUSANDS/UL (ref 149–390)
PLATELET BLD QL SMEAR: ADEQUATE
PMV BLD AUTO: 10.5 FL (ref 8.9–12.7)
POTASSIUM SERPL-SCNC: 4.7 MMOL/L (ref 3.5–5)
PR INTERVAL: 142 MS
PROT SERPL-MCNC: 8 G/DL (ref 6.4–8.3)
PROT UR STRIP-MCNC: ABNORMAL MG/DL
QRS AXIS: -43 DEGREES
QRSD INTERVAL: 94 MS
QT INTERVAL: 353 MS
QTC INTERVAL: 449 MS
RBC # BLD AUTO: 5.17 MILLION/UL (ref 3.81–5.12)
RBC #/AREA URNS AUTO: ABNORMAL /HPF
RBC MORPH BLD: NORMAL
SODIUM SERPL-SCNC: 152 MMOL/L (ref 133–145)
SP GR UR STRIP.AUTO: 1.02 (ref 1–1.03)
T WAVE AXIS: 34 DEGREES
TOTAL CELLS COUNTED SPEC: 100
UROBILINOGEN UR QL STRIP.AUTO: 0.2 E.U./DL
VARIANT LYMPHS # BLD AUTO: 1 %
VENTRICULAR RATE: 97 BPM
VIT B12 SERPL-MCNC: 1075 PG/ML (ref 100–900)
WBC # BLD AUTO: 6.31 THOUSAND/UL (ref 4.31–10.16)
WBC #/AREA URNS AUTO: ABNORMAL /HPF

## 2020-12-25 PROCEDURE — 99253 IP/OBS CNSLTJ NEW/EST LOW 45: CPT | Performed by: INTERNAL MEDICINE

## 2020-12-25 PROCEDURE — 99233 SBSQ HOSP IP/OBS HIGH 50: CPT | Performed by: INTERNAL MEDICINE

## 2020-12-25 PROCEDURE — 85007 BL SMEAR W/DIFF WBC COUNT: CPT | Performed by: INTERNAL MEDICINE

## 2020-12-25 PROCEDURE — 85027 COMPLETE CBC AUTOMATED: CPT | Performed by: INTERNAL MEDICINE

## 2020-12-25 PROCEDURE — 82553 CREATINE MB FRACTION: CPT | Performed by: INTERNAL MEDICINE

## 2020-12-25 PROCEDURE — 93010 ELECTROCARDIOGRAM REPORT: CPT | Performed by: INTERNAL MEDICINE

## 2020-12-25 PROCEDURE — 80053 COMPREHEN METABOLIC PANEL: CPT | Performed by: INTERNAL MEDICINE

## 2020-12-25 PROCEDURE — 94760 N-INVAS EAR/PLS OXIMETRY 1: CPT

## 2020-12-25 PROCEDURE — 82550 ASSAY OF CK (CPK): CPT | Performed by: INTERNAL MEDICINE

## 2020-12-25 PROCEDURE — 85379 FIBRIN DEGRADATION QUANT: CPT | Performed by: INTERNAL MEDICINE

## 2020-12-25 RX ORDER — SODIUM CHLORIDE 450 MG/100ML
150 INJECTION, SOLUTION INTRAVENOUS CONTINUOUS
Status: DISCONTINUED | OUTPATIENT
Start: 2020-12-25 | End: 2020-12-26

## 2020-12-25 RX ADMIN — CEFTRIAXONE 1000 MG: 1 INJECTION, SOLUTION INTRAVENOUS at 15:39

## 2020-12-25 RX ADMIN — DEXTROSE 125 ML/HR: 5 SOLUTION INTRAVENOUS at 05:59

## 2020-12-25 RX ADMIN — HEPARIN SODIUM 5000 UNITS: 5000 INJECTION INTRAVENOUS; SUBCUTANEOUS at 10:37

## 2020-12-25 RX ADMIN — DEXAMETHASONE SODIUM PHOSPHATE 6 MG: 4 INJECTION, SOLUTION INTRAMUSCULAR; INTRAVENOUS at 16:09

## 2020-12-25 RX ADMIN — DOXYCYCLINE 100 MG: 100 CAPSULE ORAL at 03:15

## 2020-12-25 RX ADMIN — SODIUM CHLORIDE 150 ML/HR: 0.45 INJECTION, SOLUTION INTRAVENOUS at 15:38

## 2020-12-25 RX ADMIN — HEPARIN SODIUM 5000 UNITS: 5000 INJECTION INTRAVENOUS; SUBCUTANEOUS at 03:08

## 2020-12-25 NOTE — CONSULTS
[address] [employer] [employer] [place] 68 y o  female MRN: [id number]  Unit/Bed#: -01 Encounter: [id number]    ASSESSMENT and PLAN:    62-year-old female with a history of dementia, hypertension with recently diagnosed COVID to admitted for hypoxia and shortness of breath  Nephrology consult for hyponatremia and acute kidney injury  Acute Kidney Injury  -- Present on admission (POA) ; admission creatinine: 4 2 mg/dL (no baseline creatinine known)  -- Urinalysis:  10-20 RBC, 2+ blood, 1+ protein, innumerable bacteria innumerable WBC  -- Imaging: no renal imaging  Not clinically indicated  -- Serologies: n/a  -- Etiology:  Suspect severe prerenal azotemia/volume depletion with possible component of acute tubular necrosis in the setting of COVID-19 sepsis  CPK to 1600 from 1100  Not critically elevated  Was also recently on an angiotensin receptor blocker  -- Status:  Renal function continues to improve creatinine down to 3 08 mg/dL  CPK elevated  -- Plan:   · Change fluids to half-normal saline at 150 cc/hours  · Daily basic metabolic panel  · Discussed with the team  · Trend CPK level  · Hold statin at this time  · Continue to hold losartan    Elevated CPK level  --discontinue statin especially given the rising CPK level  --can consider a more hydrophilic statin such as pravastatin if needed    Hypernatremia  --poor free intake  --no improvement currently on normal saline and D5 water  --discontinue normal saline and D5 water  --start half-normal saline at 150 cc/hour      SUMMARY OF RECOMMENDATIONS:   see above    HISTORY OF PRESENT ILLNESS:  Requesting Physician: Deepali Lopez MD  Reason for Consult: LARA, hypernatremia    Shawn Scales is a 68 y o  female who was admitted to [facility] after presenting with hypoxia  A renal consultation is requested today for assistance in the management of acute kidney injury and hypernatremia  I saw and examined the patient in full PPE  Patient not able to give any history due to underlying dementia  Presented with a creatinine of 4 2 mg/dL which is improving  Hypernatremia unchanged currently on hypotonic D5 water as well as isotonic normal saline due to an elevated CPK level of 1100 which has increased to 1600  Patient currently being treated for COVID-19  Patient was reporting diffuse body aches and myalgias  Currently on a statin  PAST MEDICAL HISTORY:  Past Medical History:   Diagnosis Date    Alzheimer's disease ([address] )     Hypertension        PAST SURGICAL HISTORY:  History reviewed  No pertinent surgical history  ALLERGIES:  No Known Allergies    SOCIAL HISTORY:  Social History     Substance and Sexual Activity   Alcohol Use Not Currently     Social History     Substance and Sexual Activity   Drug Use Not Currently     Social History     Tobacco Use   Smoking Status Never Smoker   Smokeless Tobacco Never Used       FAMILY HISTORY:  History reviewed  No pertinent family history      MEDICATIONS:    Current Facility-Administered Medications:     acetaminophen (TYLENOL) tablet 650 mg, 650 mg, Oral, Q6H PRN, Sharda Pena MD    aluminum-magnesium hydroxide-simethicone (MYLANTA) oral suspension 30 mL, 30 mL, Oral, Q6H PRN, Sharda Pena MD    ascorbic acid (VITAMIN C) tablet 1,000 mg, 1,000 mg, Oral, Q12H [facility], Sharda Pena MD    atorvastatin (LIPITOR) tablet 40 mg, 40 mg, Oral, HS, Sharda Pena MD    bisacodyl (DULCOLAX) rectal suppository 10 mg, 10 mg, Rectal, Daily PRN, Sharda Pena MD    cefTRIAXone (ROCEPHIN) IVPB (premix in dextrose) 1,000 mg 50 mL, 1,000 mg, Intravenous, Q24H, Sharda Pena MD, Last Rate: 100 mL/hr at 12/24/20 1830, 1,000 mg at 12/24/20 1830    cholecalciferol (VITAMIN D3) tablet 2,000 Units, 2,000 Units, Oral, Daily, Sharda Pena MD    dexamethasone (DECADRON) injection 6 mg, 6 mg, Intravenous, Q24H, Sharda Pena MD, 6 mg at 12/24/20 1829    doxycycline hyclate (VIBRAMYCIN) capsule 100 mg, 100 mg, Oral, Q12H, Rio Urena MD, 100 mg at 12/25/20 0315    famotidine (PEPCID) tablet 20 mg, 20 mg, Oral, BID, Rio Urena MD    heparin (porcine) subcutaneous injection 5,000 Units, 5,000 Units, Subcutaneous, Q8H [facility], Rio Urena MD, 5,000 Units at 12/25/20 1037    zinc sulfate (ZINCATE) capsule 220 mg, 220 mg, Oral, Daily **FOLLOWED BY** [START ON 12/31/2020] multivitamin-minerals (CENTRUM ADULTS) tablet 1 tablet, 1 tablet, Oral, Daily, Rio Urena MD    ondansetron [facility]) injection 4 mg, 4 mg, Intravenous, Q6H PRN, Rio Urena MD    sodium chloride infusion 0 45 %, 150 mL/hr, Intravenous, Continuous, Kimberli Foley MD    REVIEW OF SYSTEMS:  Unable to get a good ROS  PHYSICAL EXAM:  Current Weight: Weight - Scale: 73 kg (160 lb 15 oz)  First Weight: Weight - Scale: 73 kg (160 lb 15 oz)  Vitals:    12/24/20 2328 12/25/20 0500 12/25/20 0700 12/25/20 1005   BP: 129/67 110/71  119/76   BP Location: Left arm Left arm     Pulse: 70   79   Resp: 22 22  20   Temp: 99 8 °F (37 7 °C) 98 9 °F (37 2 °C)     TempSrc: Tympanic Tympanic     SpO2: 94% 92% 94% 90%   Weight:       Height:           Intake/Output Summary (Last 24 hours) at 12/25/2020 1108  Last data filed at 12/24/2020 1903  Gross per 24 hour   Intake 1681 25 ml   Output    Net 1681 25 ml     Physical Exam  Vitals signs and nursing note reviewed  Constitutional:       Appearance: She is ill-appearing  She is not toxic-appearing or diaphoretic  HENT:      Head: Normocephalic and atraumatic  Eyes:      General: No scleral icterus  Right eye: No discharge  Left eye: No discharge  Neck:      Musculoskeletal: Normal range of motion  Cardiovascular:      Rate and Rhythm: Normal rate and regular rhythm  Pulmonary:      Effort: Respiratory distress present  Breath sounds: No wheezing  Abdominal:      General: Abdomen is flat  Bowel sounds are normal  There is no distension  Palpations: There is no mass  Tenderness:  There is no abdominal tenderness  There is no guarding  Musculoskeletal:      Right lower leg: No edema  Left lower leg: No edema  Skin:     General: Skin is dry  Coloration: Skin is pale  Skin is not jaundiced  Findings: No bruising, erythema, lesion or rash  Neurological:      Mental Status: She is alert             Invasive Devices:   Urethral Catheter Temperature probe (Active)   Reasons to continue Urinary Catheter  Accurate I&O assessment in critically ill patients (48 hr  max) 12/24/20 1430   Site Assessment Clean;Skin intact 12/24/20 1430   Collection Container Standard drainage bag 12/24/20 1430   Securement Method Securing device (Describe) 12/24/20 1430     Lab Results:   Results from last 7 days   Lab Units 12/25/20  0654 12/24/20  1842 12/24/20  1307 12/24/20  1247   WBC Thousand/uL 6 31  --   --  8 99   HEMOGLOBIN g/dL 14 1  --   --  16 1*   I STAT HEMOGLOBIN g/dl  --   --  16 3*  --    HEMATOCRIT % 45 8  --   --  51 6*   HEMATOCRIT, ISTAT %  --   --  48*  --    PLATELETS Thousands/uL 351  --   --  392*   POTASSIUM mmol/L 4 7 4 3  --  4 5   CHLORIDE mmol/L 114* 116*  --  111*   CO2 mmol/L 24 24  --  24   CO2, I-STAT mmol/L  --   --  24  --    BUN mg/dL 164* 178*  --  182*   CREATININE mg/dL 3 08* 3 93*  --  4 20*   CALCIUM mg/dL 9 1 8 6  --  9 4   MAGNESIUM mg/dL  --   --   --  4 2*   ALK PHOS U/L 52 2  --   --  62 5   ALT U/L 28  --   --  36   AST U/L 37  --   --  49*   GLUCOSE, ISTAT mg/dl  --   --  135  --

## 2020-12-25 NOTE — ASSESSMENT & PLAN NOTE
Patient was found COVID positive on December 14, 2020  She was started with Decadron and multivitamins at nursing home  Her conditions got worse  Today she was found to have low oxygen saturation  Nursing home staff told that patient is not eating or drinking anything for couple of days  Chest x-ray is evident for pneumonia  Patient is requiring 6 L of oxygen  Will start on moderate COVID-19 positive  Continue on antibiotics, anticoagulation, steroids, multivitamins  Patient has severe LARA  Hold remdisivir  Self Proning  We talked to son in detail, he want patient to be full code    Continue to monitor closely

## 2020-12-25 NOTE — PROGRESS NOTES
Progress Note - Loraine Dies 1940, 68 y o  female MRN: [id number]    Unit/Bed#: -01 Encounter: [id number]    Primary Care Provider: No primary care provider on file  Date and time admitted to hospital: 12/24/2020 12:01 PM        Acute respiratory failure with hypoxia [facility])  Assessment & Plan  Patient got diagnosed with COVID-19 on December 14, 2020  After that she was started on multivitamins and Decadron in [facility]  Patient got worse over time  For past few days she is not eating or drinking anything  Her oxygen saturations dropped  That said patient was sent to the hospital   Patient does not use oxygen at baseline  Now she is requiring 6 L of oxygen to maintain saturation  Patient is in acute hypoxic respiratory failure secondary to COVID-19 pneumonia  * Pneumonia due to COVID-19 virus  Assessment & Plan  Patient was found COVID positive on December 14, 2020  She was started with Decadron and multivitamins at nursing home  Her conditions got worse  Today she was found to have low oxygen saturation  Nursing home staff told that patient is not eating or drinking anything for couple of days  Chest x-ray is evident for pneumonia  Patient is requiring 6 L of oxygen  Will start on moderate COVID-19 positive  Continue on antibiotics, anticoagulation, steroids, multivitamins  Patient has severe LARA  Hold remdisivir  Self Proning  We talked to son in detail, he want patient to be full code  Continue to monitor closely      LARA (acute kidney injury) [facility])  Assessment & Plan  Nursing home staff told that patient has not been eating for couple of days  She is not drinking enough  Patient has no previous known kidney disease  Today creatinine is improving    Continue IV fluids  Hypernatremia  Assessment & Plan  Sodium is 152 secondary to dehydration  Continue IV fluids D5 water at 200 cc/hour  Free water deficit is around 3 L       Rhabdomyolysis  Assessment & Plan  Patient had elevated CK 1186, CK level trended up to 1600  Increase IV fluid to D5 water to 200 cc an hour  Nephrology is consulted, input would be appreciated  Elevated d-dimer  Assessment & Plan  Patient has elevated D-dimer  She is on heparin 500 q 8 hours  Patient cannot get therapeutic Lovenox because she has severe Jordan I  We will get a lower extremity duplex study to rule out DVT  UTI (urinary tract infection)  Assessment & Plan  Urinalysis is dirty  Follow the urine culture  Patient is on ceftriaxone  No fever, no leukocytosis  Metabolic encephalopathy  Assessment & Plan  Patient has hypernatremia, acute kidney injury, leading to metabolic encephalopathy  At baseline patient is alert awake oriented x3  Today patient is mumbling, she is awake but disoriented  CT of the head is negative  On 2 point restraints  Hypertension  Assessment & Plan  Patient has history of hypertension, she takes losartan  VTE Pharmacologic Prophylaxis:   Pharmacologic: Heparin  Mechanical VTE Prophylaxis in Place: Yes    Current Length of Stay: 1 day(s)    Current Patient Status: Inpatient       Code Status: Level 1 - Full Code      Subjective: Today I saw and examined the patient at bedside  Patient is awake and mumbling  She is disoriented  She is tender all over  Patient was not able to answer appropriately  She is breathing on 6 L of oxygen via nasal cannula    Objective:     Vitals:   Temp (24hrs), Av 1 °F (37 8 °C), Min:98 9 °F (37 2 °C), Max:101 6 °F (38 7 °C)    Temp:  [98 9 °F (37 2 °C)-101 6 °F (38 7 °C)] 98 9 °F (37 2 °C)  HR:  [] 70  Resp:  [20-22] 22  BP: (100-129)/(65-73) 110/71  SpO2:  [92 %-100 %] 94 %  Body mass index is 25 98 kg/m²  Input and Output Summary (last 24 hours):        Intake/Output Summary (Last 24 hours) at 2020 0957  Last data filed at 2020 1903  Gross per 24 hour   Intake 1681 25 ml   Output    Net 1681 25 ml       Physical Exam: Physical Exam    Vitals signs and nursing note reviewed  Constitutional:       General: She is in acute distress  Appearance: She is well-developed  She is ill-appearing  Breathing on 6 L of oxygen via nasal cannula  HENT:      Head: Normocephalic and atraumatic  Mouth/Throat:      Mouth: Mucous membranes are dry  Eyes:      Conjunctiva/sclera: Conjunctivae normal    Neck:      Musculoskeletal: Neck supple  Cardiovascular:      Rate and Rhythm: Normal rate and regular rhythm  Heart sounds: No murmur  Pulmonary:      Effort: Respiratory distress present  Breath sounds: Normal breath sounds  Decreased air movement present  Abdominal:      Palpations: Abdomen is soft  Tenderness: There is no abdominal tenderness  Musculoskeletal:      Comments: Tender all over   Skin:     General: Skin is warm and dry  Capillary Refill: Capillary refill takes less than 2 seconds  Neurological:      General: No focal deficit present  Mental Status: She is alert  She is disoriented  Comments: Patient is moving extremities spontaneously  CT head negative  She is on restraints    Additional Data:     Labs:    Results from last 7 days   Lab Units 12/25/20  0654  12/24/20  1247   WBC Thousand/uL 6 31  --  8 99   HEMOGLOBIN g/dL 14 1  --  16 1*   I STAT HEMOGLOBIN   --    < >  --    HEMATOCRIT % 45 8  --  51 6*   HEMATOCRIT, ISTAT   --    < >  --    PLATELETS Thousands/uL 351  --  392*   LYMPHO PCT %  --   --  10*   MONO PCT %  --   --  5   EOS PCT %  --   --  0    < > = values in this interval not displayed       Results from last 7 days   Lab Units 12/25/20  0654  12/24/20  1307   POTASSIUM mmol/L 4 7   < >  --    CHLORIDE mmol/L 114*   < >  --    CO2 mmol/L 24   < >  --    CO2, I-STAT mmol/L  --   --  24   BUN mg/dL 164*   < >  --    CREATININE mg/dL 3 08*   < >  --    CALCIUM mg/dL 9 1   < >  --    ALK PHOS U/L 52 2  --   --    ALT U/L 28  --   --    AST U/L 37  --   -- GLUCOSE, ISTAT mg/dl  --   --  135    < > = values in this interval not displayed  * I Have Reviewed All Lab Data Listed Above  * Additional Pertinent Lab Tests Reviewed: [facility]      Recent Cultures (last 7 days):     Results from last 7 days   Lab Units 12/24/20  1247   BLOOD CULTURE  Received in Microbiology Lab  Culture in Progress  Received in Microbiology Lab  Culture in Progress  Last 24 Hours Medication List:   Current Facility-Administered Medications   Medication Dose Route Frequency Provider Last Rate    acetaminophen  650 mg Oral Q6H PRN Marilyn Hernandez MD      aluminum-magnesium hydroxide-simethicone  30 mL Oral Q6H PRN Marilyn Hernandez MD      ascorbic acid  1,000 mg Oral Q12H [address] Marilyn Hernandez MD      atorvastatin  40 mg Oral HS Marilyn Hernandez MD      bisacodyl  10 mg Rectal Daily PRN Marilyn Hernandez MD      cefTRIAXone  1,000 mg Intravenous Q24H Marilyn Hernandez MD 1,000 mg (12/24/20 1830)    cholecalciferol  2,000 Units Oral Daily Marilyn Hernandez MD      dexamethasone  6 mg Intravenous Q24H Marilyn Hernandez MD      dextrose  200 mL/hr Intravenous Continuous Marilyn Hernandez  mL/hr (12/25/20 0559)    doxycycline hyclate  100 mg Oral Q12H Marilyn Hernandez MD      famotidine  20 mg Oral BID Marilyn Hernandez MD      heparin (porcine)  5,000 Units Subcutaneous [facility] Marilyn Hernandez MD      zinc sulfate  220 mg Oral Daily Marilyn Hernandez MD      Followed by   Enrrique Mercer ON 12/31/2020] multivitamin-minerals  1 tablet Oral Daily Marilyn Hernandez MD      ondansetron  4 mg Intravenous Q6H PRN Marilyn Hernandez MD      sodium chloride  125 mL/hr Intravenous Continuous Hebert Portillo PA-C Stopped (12/24/20 1903)        Today, Patient Was Seen By: Marilyn Hernandez MD    ** Please Note: This note has been constructed using a voice recognition system   **

## 2020-12-25 NOTE — ASSESSMENT & PLAN NOTE
Sodium is 152 secondary to dehydration  Continue IV fluids D5 water at 200 cc/hour  Free water deficit is around 3 L

## 2020-12-25 NOTE — PLAN OF CARE
Problem: Potential for Falls  Goal: Patient will remain free of falls  Description: INTERVENTIONS:  - Assess patient frequently for physical needs  -  Identify cognitive and physical deficits and behaviors that affect risk of falls    -  [place] fall precautions as indicated by assessment   - Educate patient/family on patient safety including physical limitations  - Instruct patient to call for assistance with activity based on assessment  - Consider OT/PT consult to assist with strengthening/mobility  Outcome: Progressing     Problem: Prexisting or High Potential for Compromised Skin Integrity  Goal: Skin integrity is maintained or improved  Description: INTERVENTIONS:  - Identify patients at risk for skin breakdown  - Assess and monitor skin integrity  - Assess and monitor nutrition and hydration status  - Monitor labs   - Assess for incontinence   - Turn and reposition patient  - Assist with mobility/ambulation  - Relieve pressure over bony prominences  - Avoid friction and shearing  - Provide appropriate hygiene as needed including keeping skin clean and dry  - Evaluate need for skin moisturizer/barrier cream  - Collaborate with interdisciplinary team   - Patient/family teaching  - Consider wound care consult   Outcome: Progressing

## 2020-12-25 NOTE — ASSESSMENT & PLAN NOTE
Patient has hypernatremia, acute kidney injury, leading to metabolic encephalopathy  At baseline patient is alert awake oriented x3  Today patient is mumbling, she is awake but disoriented  CT of the head is negative  On 2 point restraints

## 2020-12-25 NOTE — ASSESSMENT & PLAN NOTE
Nursing home staff told that patient has not been eating for couple of days  She is not drinking enough  Patient has no previous known kidney disease  Today creatinine is improving    Continue IV fluids

## 2020-12-25 NOTE — ASSESSMENT & PLAN NOTE
Patient has elevated D-dimer  She is on heparin 500 q 8 hours  Patient cannot get therapeutic Lovenox because she has severe Jordan I  We will get a lower extremity duplex study to rule out DVT

## 2020-12-25 NOTE — ASSESSMENT & PLAN NOTE
Patient had elevated CK 1186, CK level trended up to 1600  Increase IV fluid to D5 water to 200 cc an hour  Nephrology is consulted, input would be appreciated

## 2020-12-25 NOTE — NURSING NOTE
Pt very combative when any procedures are attempted med resident made aware that pt not cooperative will order sedation The current medical regimen is effective;  continue present plan and medications will continue to monitor

## 2020-12-25 NOTE — ASSESSMENT & PLAN NOTE
Urinalysis is dirty  Follow the urine culture  Patient is on ceftriaxone  No fever, no leukocytosis

## 2020-12-25 NOTE — ASSESSMENT & PLAN NOTE
Patient got diagnosed with COVID-19 on December 14, 2020  After that she was started on multivitamins and Decadron in [facility]  Patient got worse over time  For past few days she is not eating or drinking anything  Her oxygen saturations dropped  That said patient was sent to the hospital   Patient does not use oxygen at baseline  Now she is requiring 6 L of oxygen to maintain saturation  Patient is in acute hypoxic respiratory failure secondary to COVID-19 pneumonia

## 2020-12-26 PROBLEM — U07.1 COVID-19: Status: ACTIVE | Noted: 2020-12-26

## 2020-12-26 LAB
ALBUMIN SERPL BCP-MCNC: 3.7 G/DL (ref 3.4–4.8)
ALP SERPL-CCNC: 47.8 U/L (ref 35–140)
ALT SERPL W P-5'-P-CCNC: 26 U/L (ref 5–54)
ANION GAP SERPL CALCULATED.3IONS-SCNC: 11 MMOL/L (ref 4–13)
AST SERPL W P-5'-P-CCNC: 33 U/L (ref 15–41)
BASOPHILS # BLD MANUAL: 0 THOUSAND/UL (ref 0–0.1)
BASOPHILS NFR MAR MANUAL: 0 % (ref 0–1)
BILIRUB SERPL-MCNC: 0.51 MG/DL (ref 0.3–1.2)
BUN SERPL-MCNC: 125 MG/DL (ref 6–20)
CALCIUM SERPL-MCNC: 9 MG/DL (ref 8.4–10.2)
CHLORIDE SERPL-SCNC: 116 MMOL/L (ref 96–108)
CK MB SERPL-MCNC: 6.9 NG/ML (ref 0.1–5.9)
CK MB SERPL-MCNC: <1 % (ref 0–2.5)
CK SERPL-CCNC: 1128.2 U/L (ref 38–234)
CO2 SERPL-SCNC: 24 MMOL/L (ref 22–33)
CREAT SERPL-MCNC: 2.01 MG/DL (ref 0.4–1.1)
EOSINOPHIL # BLD MANUAL: 0 THOUSAND/UL (ref 0–0.4)
EOSINOPHIL NFR BLD MANUAL: 0 % (ref 0–6)
ERYTHROCYTE [DISTWIDTH] IN BLOOD BY AUTOMATED COUNT: 14.1 % (ref 11.6–15.1)
GFR SERPL CREATININE-BSD FRML MDRD: 23 ML/MIN/1.73SQ M
GLUCOSE SERPL-MCNC: 133 MG/DL (ref 65–140)
HCT VFR BLD AUTO: 44.3 % (ref 34.8–46.1)
HGB BLD-MCNC: 14 G/DL (ref 11.5–15.4)
LYMPHOCYTES # BLD AUTO: 0.37 THOUSAND/UL (ref 0.6–4.47)
LYMPHOCYTES # BLD AUTO: 4 % (ref 14–44)
MCH RBC QN AUTO: 27.8 PG (ref 26.8–34.3)
MCHC RBC AUTO-ENTMCNC: 31.6 G/DL (ref 31.4–37.4)
MCV RBC AUTO: 88 FL (ref 82–98)
MONOCYTES # BLD AUTO: 0.27 THOUSAND/UL (ref 0–1.22)
MONOCYTES NFR BLD: 3 % (ref 4–12)
NEUTROPHILS # BLD MANUAL: 8.49 THOUSAND/UL (ref 1.85–7.62)
NEUTS BAND NFR BLD MANUAL: 13 % (ref 0–8)
NEUTS SEG NFR BLD AUTO: 80 % (ref 43–75)
PLATELET # BLD AUTO: 356 THOUSANDS/UL (ref 149–390)
PLATELET BLD QL SMEAR: ADEQUATE
PMV BLD AUTO: 10.3 FL (ref 8.9–12.7)
POTASSIUM SERPL-SCNC: 4.2 MMOL/L (ref 3.5–5)
PROT SERPL-MCNC: 7.9 G/DL (ref 6.4–8.3)
RBC # BLD AUTO: 5.04 MILLION/UL (ref 3.81–5.12)
RBC MORPH BLD: NORMAL
SODIUM SERPL-SCNC: 151 MMOL/L (ref 133–145)
TOTAL CELLS COUNTED SPEC: 100
WBC # BLD AUTO: 9.13 THOUSAND/UL (ref 4.31–10.16)

## 2020-12-26 PROCEDURE — 87186 SC STD MICRODIL/AGAR DIL: CPT | Performed by: INTERNAL MEDICINE

## 2020-12-26 PROCEDURE — 92610 EVALUATE SWALLOWING FUNCTION: CPT

## 2020-12-26 PROCEDURE — 87181 SC STD AGAR DILUTION PER AGT: CPT | Performed by: INTERNAL MEDICINE

## 2020-12-26 PROCEDURE — 94760 N-INVAS EAR/PLS OXIMETRY 1: CPT

## 2020-12-26 PROCEDURE — 99233 SBSQ HOSP IP/OBS HIGH 50: CPT | Performed by: INTERNAL MEDICINE

## 2020-12-26 PROCEDURE — 85027 COMPLETE CBC AUTOMATED: CPT | Performed by: INTERNAL MEDICINE

## 2020-12-26 PROCEDURE — 85007 BL SMEAR W/DIFF WBC COUNT: CPT | Performed by: INTERNAL MEDICINE

## 2020-12-26 PROCEDURE — 87086 URINE CULTURE/COLONY COUNT: CPT | Performed by: INTERNAL MEDICINE

## 2020-12-26 PROCEDURE — 82553 CREATINE MB FRACTION: CPT | Performed by: INTERNAL MEDICINE

## 2020-12-26 PROCEDURE — 87077 CULTURE AEROBIC IDENTIFY: CPT | Performed by: INTERNAL MEDICINE

## 2020-12-26 PROCEDURE — 82550 ASSAY OF CK (CPK): CPT | Performed by: INTERNAL MEDICINE

## 2020-12-26 PROCEDURE — 80053 COMPREHEN METABOLIC PANEL: CPT | Performed by: INTERNAL MEDICINE

## 2020-12-26 RX ORDER — HALOPERIDOL 5 MG/ML
2 INJECTION INTRAMUSCULAR EVERY 6 HOURS PRN
Status: DISCONTINUED | OUTPATIENT
Start: 2020-12-26 | End: 2020-12-29

## 2020-12-26 RX ORDER — LORAZEPAM 2 MG/ML
2 INJECTION INTRAMUSCULAR ONCE
Status: COMPLETED | OUTPATIENT
Start: 2020-12-26 | End: 2020-12-26

## 2020-12-26 RX ORDER — RISPERIDONE 0.25 MG/1
0.25 TABLET, FILM COATED ORAL 2 TIMES DAILY
Status: DISCONTINUED | OUTPATIENT
Start: 2020-12-26 | End: 2020-12-29

## 2020-12-26 RX ORDER — DEXTROSE MONOHYDRATE 50 MG/ML
200 INJECTION, SOLUTION INTRAVENOUS CONTINUOUS
Status: DISCONTINUED | OUTPATIENT
Start: 2020-12-26 | End: 2020-12-28

## 2020-12-26 RX ORDER — MIRTAZAPINE 15 MG/1
7.5 TABLET, FILM COATED ORAL
Status: DISCONTINUED | OUTPATIENT
Start: 2020-12-26 | End: 2020-12-29

## 2020-12-26 RX ADMIN — HEPARIN SODIUM 5000 UNITS: 5000 INJECTION INTRAVENOUS; SUBCUTANEOUS at 06:12

## 2020-12-26 RX ADMIN — HEPARIN SODIUM 5000 UNITS: 5000 INJECTION INTRAVENOUS; SUBCUTANEOUS at 00:07

## 2020-12-26 RX ADMIN — LORAZEPAM 2 MG: 2 INJECTION INTRAMUSCULAR; INTRAVENOUS at 10:28

## 2020-12-26 RX ADMIN — DEXTROSE 150 ML/HR: 5 SOLUTION INTRAVENOUS at 15:17

## 2020-12-26 RX ADMIN — DEXTROSE 150 ML/HR: 5 SOLUTION INTRAVENOUS at 12:36

## 2020-12-26 RX ADMIN — DEXAMETHASONE SODIUM PHOSPHATE 6 MG: 4 INJECTION, SOLUTION INTRAMUSCULAR; INTRAVENOUS at 15:16

## 2020-12-26 RX ADMIN — HEPARIN SODIUM 5000 UNITS: 5000 INJECTION INTRAVENOUS; SUBCUTANEOUS at 15:16

## 2020-12-26 RX ADMIN — CEFTRIAXONE 1000 MG: 1 INJECTION, SOLUTION INTRAVENOUS at 15:17

## 2020-12-26 RX ADMIN — SODIUM CHLORIDE 150 ML/HR: 0.45 INJECTION, SOLUTION INTRAVENOUS at 00:06

## 2020-12-26 NOTE — ASSESSMENT & PLAN NOTE
Creatinine on admission on 4 08, likely prerenal because of inadequate p o  Intake as reported by the nursing home  Improved yesterday up to 3 6  Left from a m  Today pending      Plan:  --nephrology following, continue to trend BMP  --continue half-normal saline at 150 cc/hour as per Nephrology recommendation  --decent Uop

## 2020-12-26 NOTE — TREATMENT PLAN
No Labs to comment on, as unable to obtain IV access  Multple attempts made  Encourage free water intake until IV in place, and able to check labs

## 2020-12-26 NOTE — PROGRESS NOTES
Progress Note - Austin Erickson 1940, 68 y o  female MRN: [id number]    Unit/Bed#: -01 Encounter: [id number]    Primary Care Provider: No primary care provider on file  Date and time admitted to hospital: 12/24/2020 12:01 PM        Acute respiratory failure with hypoxia [facility])  Assessment & Plan  COVID 19 on Dec 14, 2020  Patient got diagnosed with COVID-19 on December 14, 2020  After that she was started on multivitamins and Decadron [facility] [place]  Patient got worse over time  For past few days she is not eating or drinking anything  Her oxygen saturations dropped  That said patient was sent to the hospital   Patient does not use oxygen at baseline  Now she is requiring 6 L of oxygen to maintain saturation  Patient is in acute hypoxic respiratory failure secondary to COVID-19 pneumonia  O2 sats currently improving  Down from 6 L to 4 L via nasal cannula  No home o2 use  Other vitals stable  Labs from today morning pending  Plan:  -- for COVID, continue decadron d3 today  -- remdesivir deferred due to JORDAN  -- plasma ordered 12/24/2020  -- titrate o2  -- respiratory protocol  -- continue ceftri and doxy d3 today    COVID-19  Assessment & Plan  See above    Elevated d-dimer  Assessment & Plan  Patient has elevated D-dimer  She is on heparin 5000 q 8 hours  Patient cannot get therapeutic Lovenox because she has severe Jordan I  We will get a lower extremity duplex study to rule out DVT  UTI (urinary tract infection)  Assessment & Plan  Urinalysis is dirty  Follow the urine culture  Patient is on ceftriaxone  No fever, no leukocytosis  Metabolic encephalopathy  Assessment & Plan  Patient has hypernatremia, acute kidney injury, leading to metabolic encephalopathy  At baseline patient is alert awake oriented x3  Today patient is mumbling, she is awake but disoriented  CT of the head is negative  On 2 point restraints        Alzheimer's disease [facility])  Assessment & Plan  Patient has baseline dementia  But alert awake oriented x3 at baseline  She walks normal, she can feed herself  Continue dementia care  Currently does not follow commands, difficult stick for blood work  Hypertension  Assessment & Plan  Patient has history of hypertension, she takes losartan  Now on hold 2/2 lara    LARA (acute kidney injury) ([facility] [address] )  Assessment & Plan  Creatinine on admission on 4 08, likely prerenal because of inadequate p o  Intake as reported by the nursing home  Improved yesterday up to 3 6  Left from a m  Today pending  Plan:  --nephrology following, continue to trend BMP  --continue half-normal saline at 150 cc/hour as per Nephrology recommendation  --decent Uop    Hypernatremia  Assessment & Plan  Sodium is 152 on admission, down trending to 148 yesterday  BMP from AM pending  Nephro following  Currently continued on half NS @ 150 as per nephro      * Pneumonia due to COVID-19 virus  Assessment & Plan  Patient was found COVID positive on December 14, 2020  She was started with Decadron and multivitamins at nursing home  Her conditions got worse  Today she was found to have low oxygen saturation  Nursing home staff told that patient is not eating or drinking anything for couple of days  Chest x-ray is evident for pneumonia  Patient is requiring 6 L of oxygen  Will start on moderate COVID-19 positive  Continue on antibiotics, anticoagulation, steroids, multivitamins  Patient has severe LARA  Hold remdisivir  Self Proning  We talked to son in detail, he want patient to be full code  Continue to monitor closely        VTE Pharmacologic Prophylaxis:   Pharmacologic: Heparin  Mechanical VTE Prophylaxis in Place: Yes    Discussions with Specialists or Other Care Team Provider: Psychiatry    Education and Discussions with Family / Patient: Will continue to discuss at length with family as needed      Current Length of Stay: 2 day(s)    Current Patient Status: Inpatient Discharge Plan / Estimated Discharge Date: TBD    Code Status: Level 1 - Full Code      Subjective:   Patient seen and examined at bedside  Patient currently does not follow any commands or provide any appropriate answers  Only keeps and vomiting  No acute overnight events reported  Patient currently stable on nearly 4 L via NC  Objective:     Vitals:   Temp (24hrs), Av 5 °F (36 4 °C), Min:95 1 °F (35 1 °C), Max:98 5 °F (36 9 °C)    Temp:  [95 1 °F (35 1 °C)-98 5 °F (36 9 °C)] 95 1 °F (35 1 °C)  HR:  [59-74] 59  Resp:  [14-21] 15  BP: (105-138)/(51-77) 105/72  SpO2:  [92 %-98 %] 93 %  Body mass index is 25 98 kg/m²  Input and Output Summary (last 24 hours): Intake/Output Summary (Last 24 hours) at 2020 1200  Last data filed at 2020 0006  Gross per 24 hour   Intake 1000 ml   Output 1100 ml   Net -100 ml       Physical Exam:     Physical Exam  Constitutional:       Appearance: She is not ill-appearing or toxic-appearing  Comments: Limited assessment due to mental status     HENT:      Head: Normocephalic and atraumatic  Nose: Nose normal    Eyes:      Extraocular Movements: Extraocular movements intact  Conjunctiva/sclera: Conjunctivae normal    Cardiovascular:      Rate and Rhythm: Normal rate and regular rhythm  Pulses: Normal pulses  Heart sounds: Normal heart sounds  Pulmonary:      Effort: Pulmonary effort is normal  No respiratory distress  Comments: Limited assessment as does not comply with exam    Abdominal:      General: Abdomen is flat  Palpations: Abdomen is soft  Skin:     General: Skin is warm  Capillary Refill: Capillary refill takes less than 2 seconds  Neurological:      Mental Status: She is disoriented        Comments: Limited assessment as pt not compliant with exam           Additional Data:     Labs:    Results from last 7 days   Lab Units 20  0654   WBC Thousand/uL 6 31   HEMOGLOBIN g/dL 14 1   HEMATOCRIT % 45 8   PLATELETS Thousands/uL 351   LYMPHO PCT % 8*   MONO PCT % 0*   EOS PCT % 0     Results from last 7 days   Lab Units 12/25/20  0654  12/24/20  1307   POTASSIUM mmol/L 4 7   < >  --    CHLORIDE mmol/L 114*   < >  --    CO2 mmol/L 24   < >  --    CO2, I-STAT mmol/L  --   --  24   BUN mg/dL 164*   < >  --    CREATININE mg/dL 3 08*   < >  --    CALCIUM mg/dL 9 1   < >  --    ALK PHOS U/L 52 2  --   --    ALT U/L 28  --   --    AST U/L 37  --   --    GLUCOSE, ISTAT mg/dl  --   --  135    < > = values in this interval not displayed  * I Have Reviewed All Lab Data Listed Above  * Additional Pertinent Lab Tests Reviewed: [facility] [address] Admission Reviewed    Imaging:    Imaging Reports Reviewed Today Include: Head CT  Imaging Personally Reviewed by Myself Includes:  Head CT    Recent Cultures (last 7 days):     Results from last 7 days   Lab Units 12/24/20  1247   BLOOD CULTURE  No Growth at 24 hrs  No Growth at 24 hrs         Last 24 Hours Medication List:   Current Facility-Administered Medications   Medication Dose Route Frequency Provider Last Rate    acetaminophen  650 mg Oral Q6H PRN Nick Hope MD      aluminum-magnesium hydroxide-simethicone  30 mL Oral Q6H PRN Nick Hope MD      ascorbic acid  1,000 mg Oral Q12H [address] Nick Hope MD      bisacodyl  10 mg Rectal Daily PRN Nick Hope MD      cefTRIAXone  1,000 mg Intravenous Q24H Nick Hope MD 1,000 mg (12/25/20 1539)    cholecalciferol  2,000 Units Oral Daily Nick Hope MD      dexamethasone  6 mg Intravenous Q24H Nick Hope MD      dextrose  150 mL/hr Intravenous Continuous Rachel Pierson MD      doxycycline hyclate  100 mg Oral Q12H Nick Hope MD      famotidine  20 mg Oral BID Nick Hope MD      haloperidol lactate  2 mg Intramuscular Q6H PRN Janice Velez MD      heparin (porcine)  5,000 Units Subcutaneous Q8H [address] Nick Hope MD      mirtazapine  7 5 mg Oral HS MD Sindy Maurer zinc sulfate  220 mg Oral Daily Santi Kaur MD      Followed by   Amberly Arriaga ON 12/31/2020] multivitamin-minerals  1 tablet Oral Daily Santi Kaur MD      ondansetron  4 mg Intravenous Q6H PRN Santi Kaur MD      risperiDONE  0 25 mg Oral BID Alvaro Fang MD          Today, Patient Was Seen By: Vijay Jasso MD    ** Please Note: This note has been constructed using a voice recognition system   **

## 2020-12-26 NOTE — NURSING NOTE
Unable to obtain IV access because patient resists placement  3 nurses attempted to assist patient cooperation

## 2020-12-26 NOTE — QUICK NOTE
Patient's son has been updated about the changes  Patient is agitated and needs IV access and resistive to care  Will give Ativan 2 mg IM   Consult psychiatry  Nursing staff to try to get IV access  Patient's son prefers only information to be given to the son who is power of  and not to the daughters or other family members

## 2020-12-26 NOTE — NURSING NOTE
IV infiltrated when convalescent plasma infusing  Attempted to regain accessed straight away to continue plasma, without success  Pt received approximately 150 mL of Plasma  Pt is paranoid and does not like anyone touching her  She flinches as soon as anyone touches her  3 nurses where in room to hold arm still and she still manages to flinch  Unable to get access at this point  Pt upset  Tried to calm patient, but this agitates patient more  Will continue to monitor  SLIM aware of lack of IV access

## 2020-12-26 NOTE — UTILIZATION REVIEW
Initial Clinical Review    Admission: Date/Time/Statement:   Admission Orders (From admission, onward)     Ordered        12/24/20 1539  Inpatient Admission  Once         12/24/20 1456  Inpatient Admission  Once                   Orders Placed This Encounter   Procedures    Inpatient Admission     Standing Status:   Standing     Number of Occurrences:   1     Order Specific Question:   Admitting Physician     Answer:   Sallie Aleman     Order Specific Question:   Level of Care     Answer:   Med Surg [16]     Order Specific Question:   Estimated length of stay     Answer:   More than 2 Midnights     Order Specific Question:   Certification     Answer:   I certify that inpatient services are medically necessary for this patient for a duration of greater than two midnights  See H&P and MD Progress Notes for additional information about the patient's course of treatment   Inpatient Admission     Standing Status:   Standing     Number of Occurrences:   1     Order Specific Question:   Admitting Physician     Answer:   Sallie Aleman     Order Specific Question:   Level of Care     Answer:   Level 2 Stepdown / HOT [14]     Order Specific Question:   Estimated length of stay     Answer:   More than 2 Midnights     Order Specific Question:   Certification     Answer:   I certify that inpatient services are medically necessary for this patient for a duration of greater than two midnights  See H&P and MD Progress Notes for additional information about the patient's course of treatment  ED Arrival Information     Expected Arrival Acuity Means of Arrival Escorted By Service Admission Type    - 12/24/2020 12:01 [facility]    Arrival Complaint    Covid Positive        Chief Complaint   Patient presents with    Decreased Oxygen Level     Pt known COVID [facility]  Staff found her with decreased O2 in the 70's   Sat went up with nasal cannula O2  Pt also has elevated BUN     Assessment/Plan: 69 yo female with hx of dementia, hypertension and positive  COVID infection 12/14/20 on oral steroids, not on O2 presented to ED from facility via ambulance with  worsening condition-decreased oxygen saturation to 77% today at facility and pt no longer eating or drinking  ,lethargic  On exam, pt is nonverbal, disoriented, in resp distress with decreased air movement, pt febrile , tachycardic, tachypneic and placed on O2 6L  Labs show elevated sodium, elevated CK and elevated creat   CXR shows bilat pneumonia  CT head negative, at baseline pt is oriented x 3  Pt given IVF, NJ Tylenol, IV Rocephin, IV steroids and heparin SQ  Pt admitted as Inpatient with acute hypoxic respiratory failure 2/2 COVID pneumonia and with LARA / hypernatremia 2/2 dehydration/rhabdomyolysis/metabolic encephalopathy  Plan is for continued O2 , IV abx,, IV steroids,self proning hold IV remdseivir due to LARA, continue aggressive  IVF, consult nephrology,place lou catheter to monitor I/O monitor BMP and CK   12/25   Medicine-  D dimer elevated, on [id number]  Will check VAS   CK level up trended, will increase IVFof D5W to 200 ml/h,  Pt in 2 point restraints due to being combative  Nephrology- suspects prerenal azotemia/volume depletion with possible component ATN  CK total elevated, renal function improving on IVF  Plan to change IVF to 1/2 NS @150 ml/h and monitor sodium level which remains unchanged since admission, continue with daily BMP, trend CPK level, hold statin and losartan  Approx 150 ml Convalescent plasma given 12/25before line infiltrated  Pt flinches when touched and unable to reinsert IV line  12/26  Pt given IM ativan for agitation/sesistance to care with consult to psychiatry placed  Psychiatry-Patient is anxious depressed guarded paranoid combative and unable to redirect her  Pt is  probably delirious and hallucinating secondary to medical condition COVID-19 pneumonia, delusional ,poor concentration,insight and judgments are impaired  plan is for psychotropic meds for a short time  -remeron HS, risperdal BID, haldol IM prn,psychotherapy  Medicine-sats improving , O2 decreased to 4 Lnc  Plan to continue IV steroids , continue IV/po abx  Creat continues to downtrend  plan to continue monitoring renal status and sodium levels    ED Triage Vitals   Temperature Pulse Respirations Blood Pressure SpO2   12/24/20 1308 12/24/20 1207 12/24/20 1207 12/24/20 1207 12/24/20 1207   (!) 101 6 °F (38 7 °C) (!) 113 20 108/65 93 %      Temp Source Heart Rate Source Patient Position - Orthostatic VS BP Location FiO2 (%)   12/24/20 1308 12/24/20 1207 12/24/20 1207 12/24/20 1207 --   Rectal Monitor Lying Left arm       Pain Score       12/24/20 1334       Med Not Given for Pain - for MAR use only          Wt Readings from Last 1 Encounters:   12/24/20 73 kg (160 lb 15 oz)     Additional Vital Signs:   Date/Time  Temp  Pulse  Resp  BP  MAP (mmHg)  SpO2  Calculated FIO2 (%) - Nasal Cannula  Nasal Cannula O2 Flow Rate (L/min)  O2 Device  Patient Position - Orthostatic VS   12/26/20 0828  95 1 °F (35 1 °C)Abnormal   59    105/72    93 %  36  4 L/min  Nasal cannula  Lying   12/26/20 0129  98 2 °F (36 8 °C)  65  15  114/77    98 %  36  4 L/min  Nasal cannula     12/26/20 0114  98 5 °F (36 9 °C)  74  14  138/51    96 %  36  4 L/min  Nasal cannula     12/26/20 0059  98 2 °F (36 8 °C)  74  21  121/65    97 %  36  4 L/min       12/25/20 2204  97 6 °F (36 4 °C)  67  18  127/53  82  92 %  36  4 L/min  Nasal cannula     12/25/20 1005  99 °F (37 2 °C)  79  20  119/76    90 %           12/25/20 0700            94 %  36  4 L/min  Nasal cannula     12/25/20 0500  98 9 °F (37 2 °C)    22  110/71    92 %  36  4 L/min  Nasal cannula  Lying   12/24/20 2328  99 8 °F (37 7 °C)  70  22  129/67    94 %  36  4 L/min  Nasal cannula  Lying   12/24/20 1819    95  22  100/68    94 %  44  6 L/min Nasal cannula  Lying   12/24/20 1358    95  21  125/73    100 %  44  6 L/min  Nasal cannula  Lying       Pertinent Labs/Diagnostic Test Results:      12/24 CXR  Patchy airspace opacities bilaterally compatible with multilobar pneumonia    12/24  CT head  No acute intracranial abnormality    12/24 ECG  Sinus rhythm  Left anterior fascicular block  Consider anterior infarct  VAS -not scheduled yet  Results from last 7 days   Lab Units 12/26/20  1211 12/25/20  0654 12/24/20  1307 12/24/20  1247   WBC Thousand/uL 9 13 6 31  --  8 99   HEMOGLOBIN g/dL 14 0 14 1  --  16 1*   I STAT HEMOGLOBIN g/dl  --   --  16 3*  --    HEMATOCRIT % 44 3 45 8  --  51 6*   HEMATOCRIT, ISTAT %  --   --  48*  --    PLATELETS Thousands/uL 356 351  --  392*   BANDS PCT %  --  6  --  22*         Results from last 7 days   Lab Units 12/26/20  1211 12/25/20  0654 12/24/20  1842 12/24/20  1307 12/24/20  1247   SODIUM mmol/L 151* 152* 154*  --  152*   POTASSIUM mmol/L 4 2 4 7 4 3  --  4 5   CHLORIDE mmol/L 116* 114* 116*  --  111*   CO2 mmol/L 24 24 24  --  24   CO2, I-STAT mmol/L  --   --   --  24  --    ANION GAP mmol/L 11 14* 14*  --  17*   BUN mg/dL 125* 164* 178*  --  182*   CREATININE mg/dL 2 01* 3 08* 3 93*  --  4 20*   EGFR ml/min/1 73sq m 23 14 10  --  10   CALCIUM mg/dL 9 0 9 1 8 6  --  9 4   CALCIUM, IONIZED, ISTAT mmol/L  --   --   --  1 03*  --    MAGNESIUM mg/dL  --   --   --   --  4 2*     Results from last 7 days   Lab Units 12/26/20  1211 12/25/20  0654 12/24/20  1247   AST U/L 33 37 49*   ALT U/L 26 28 36   ALK PHOS U/L 47 8 52 2 62 5   TOTAL PROTEIN g/dL 7 9 8 0 9 1*   ALBUMIN g/dL 3 7 3 6 4 1   TOTAL BILIRUBIN mg/dL 0 51 0 41 0 49     Results from last 7 days   Lab Units 12/24/20  1305   POC GLUCOSE mg/dl 143*     Results from last 7 days   Lab Units 12/26/20  1211 12/25/20  0654 12/24/20  1842 12/24/20  1247   GLUCOSE RANDOM mg/dL 133 200* 122 142*           Results from last 7 days   Lab Units 12/24/20  1307   PH, DAVID I-STAT 7 439*   PCO2, DAVID ISTAT mm HG 34 2*   PO2, DAVID ISTAT mm HG 40 0   HCO3, DAVID ISTAT mmol/L 23 2*   I STAT BASE EXC mmol/L 0   I STAT O2 SAT % 78     Results from last 7 days   Lab Units 12/26/20  1211 12/25/20  0654 12/24/20  1247   CK TOTAL U/L 1,128 2* 1,649 4* 1,186 2*   CK MB INDEX % <1 0 <1 0 <1 0   CK MB ng/mL 6 9* 6 0* 2 9     Results from last 7 days   Lab Units 12/24/20  1247   TROPONIN I ng/mL 0 06     Results from last 7 days   Lab Units 12/25/20  0654 12/24/20  1842   D-DIMER QUANTITATIVE mg/L FEU 9 09* 11 92*                 Results from last 7 days   Lab Units 12/24/20  1247   LACTIC ACID mmol/L 1 4             Results from last 7 days   Lab Units 12/24/20  1247   BNP pg/mL 22 8                 Results from last 7 days   Lab Units 12/24/20  1247   CRP mg/L 20 0*         Results from last 7 days   Lab Units 12/24/20  2334   CLARITY UA  Slightly Cloudy*   COLOR UA  Yellow   SPEC GRAV UA  1 025   PH UA  5 5   GLUCOSE UA mg/dl Negative   KETONES UA mg/dl Negative   BLOOD UA  2+*   PROTEIN UA mg/dl 1+*   NITRITE UA  Positive*   BILIRUBIN UA  Negative   UROBILINOGEN UA E U /dl 0 2   LEUKOCYTES UA  2+*   WBC UA /hpf Innumerable*   RBC UA /hpf 10-20*   BACTERIA UA /hpf Innumerable*   EPITHELIAL CELLS WET PREP /hpf Moderate*           Results from last 7 days   Lab Units 12/24/20  1247   BLOOD CULTURE  No Growth at 24 hrs  No Growth at 24 hrs       Results from last 7 days   Lab Units 12/25/20  0654 12/24/20  1247   TOTAL COUNTED  100 100           ED Treatment:   Medication Administration from 12/24/2020 1201 to 12/24/2020 2232       Date/Time Order Dose Route Action     12/24/2020 1251 sodium chloride 0 9 % bolus 1,000 mL 1,000 mL Intravenous New Bag     12/24/2020 1334 acetaminophen (TYLENOL) rectal suppository 650 mg 650 mg Rectal Given     12/24/2020 1336 sodium chloride 0 9 % infusion 125 mL/hr Intravenous New Bag     12/24/2020 1829 dextrose 5 % infusion 125 mL/hr Intravenous New Bag     12/24/2020 1834 cefTRIAXone (ROCEPHIN) IVPB (premix in dextrose) 1,000 mg 50 mL 1,000 mg Intravenous New Bag     12/24/2020 1829 dexamethasone (DECADRON) injection 6 mg 6 mg Intravenous Given     12/24/2020 1829 heparin (porcine) subcutaneous injection 5,000 Units 5,000 Units Subcutaneous Given        Past Medical History:   Diagnosis Date    [facility]     Hypertension          Admitting Diagnosis: Rhabdomyolysis [M62 82]  Hyperchloremia [E87 8]  Hypermagnesemia [E83 41]  Respiratory alkalosis [E87 3]  Hypernatremia [E87 0]  Hypoxia [R09 02]  Severe dehydration [E86 0]  Acute renal failure (ARF) ([facility]) [N17 9]  LARA (acute kidney injury) ([facility] ) [N17 9]  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]  Pneumonia due to COVID-19 virus [U07 1, J12 89]  Age/Sex: 68 y o  female  Admission Orders:  Scheduled Medications:  ascorbic acid, 1,000 mg, Oral, Q12H [address]  cefTRIAXone, 1,000 mg, Intravenous, Q24H  cholecalciferol, 2,000 Units, Oral, Daily  dexamethasone, 6 mg, Intravenous, Q24H  doxycycline hyclate, 100 mg, Oral, Q12H  famotidine, 20 mg, Oral, BID  heparin (porcine), 5,000 Units, Subcutaneous, Q8H KENNETH  mirtazapine, 7 5 mg, Oral, HS  zinc sulfate, 220 mg, Oral, Daily    Followed by  Demetria Sanchez ON 12/31/2020] multivitamin-minerals, 1 tablet, Oral, Daily  risperiDONE, 0 25 mg, Oral, BID    LORazepam (ATIVAN) injection 2 mg   Dose: 2 mg    Freq:  Once Route: IM x1 12/26  Continuous IV Infusions:  dextrose, 150 mL/hr, Intravenous, Continuous  sodium chloride infusion 0 45 %   Rate: 150 mL/hr Dose: 150 mL/hr  Freq: Continuous Route: IV-d/c'ed 12/26 am     PRN Meds:  acetaminophen, 650 mg, Oral, Q6H PRN  aluminum-magnesium hydroxide-simethicone, 30 mL, Oral, Q6H PRN  bisacodyl, 10 mg, Rectal, Daily PRN  haloperidol lactate, 2 mg, Intramuscular, Q6H PRN  ondansetron, 4 mg, Intravenous, Q6H PRN      SCD's  Self proning  Activity as tolerates , up w/ assist  O2  spirometry  Cardiac diet   Convalescent plasma-1 unit  PT/OT/SLP evals  Contact and airborne isolation    IP CONSULT TO NEPHROLOGY  IP CONSULT TO PSYCHIATRY    Network Utilization Review Department  ATTENTION: Please call with any questions or concerns to [phone] and carefully listen to the prompts so that you are directed to the right person  All voicemails are confidential   Elina Carbajal all requests for admission clinical reviews, approved or denied determinations and any other requests to dedicated fax number below belonging to the campus where the patient is receiving treatment   List of dedicated fax numbers for the Facilities:  [address] DENIALS (Administrative/Medical Necessity) [phone]   [address] (Maternity/NICU/Pediatrics) [phone]   [address] [facility] [address] Rene Escobar 5823 (Evelin Chaney "Jaz" 103) [address] Maria C Deluca 14[address] [place] [address] [phone]

## 2020-12-26 NOTE — SPEECH THERAPY NOTE
Speech Language/Pathology  Speech-Language Pathology Bedside Swallow Evaluation      Patient Name: Marcio January    [id number] Date: 12/26/2020     Problem List  Principal Problem:    Pneumonia due to COVID-19 virus  Active Problems:    Acute respiratory failure with hypoxia (HCC)    Hypernatremia    LARA (acute kidney injury) ([facility] )    Rhabdomyolysis    Hypertension    Alzheimer's disease ([facility] )    Metabolic encephalopathy    UTI (urinary tract infection)    Elevated d-dimer    COVID-19      Past Medical History  Past Medical History:   Diagnosis Date    Alzheimer's disease ([facility] )     Hypertension        Past Surgical History  History reviewed  No pertinent surgical history  Summary   Pt presented with  s/s suggestive of moderate/severe oral and suspected moderate pharyngeal dysphagia  Symptoms or concerns included decreased bolus acceptance, decreased bolus propulsion, decreased bolus formation and suspected decreased control of all material,  suspected pharyngeal swallow delay, suspected decreased hyolaryngeal elevation upon palpation and multiple swallows  Pt unable to successfully retrieve material despite attempts  Risk/s for Aspiration: high    Recommended Diet: puree/level 1 diet and honey thick liquids   Recommended Form of Meds: crushed with puree   Aspiration precautions and swallowing strategies: only feed when fully alert, small bites/sips, no straws and liquids by teaspoon only  Other Recommendations/Considerations: high nutritional risk  Will briefly follow as able to assist        Current Medical Status  Pt is a 68 y o  female who presented to [address] [place] [address] with history of high blood sugar, dementia COVID-19 infection positive on 12/14 presents with change in mental status and lethargy and weakness   Patient at baseline is able to communicate and  according to the son she was forgetful but to talk on the phone about a week ago    Patient was noted to be saturating 77% today and placed on 6 L of oxygen by nasal cannula saturating 94%  Patient is alert but nonverbal   Patient moving upper and lower extremities patient has decreased air entry bilaterally  S1, S2 heard  Abdomen is soft and nontender  Bowel sounds present  Extremities shows no pedal edema  Dry oral mucosa noted  Blood work shows acute kidney injury likely prerenal with dehydration  Will give aggressive IV hydration and monitor BUN/creatinine and consult Nephrology  Will place Davis catheter to monitor intake and output  Patient has bilateral ground-glass opacities COVID-19 pneumonia and will treat moderate pathway  Will hold off on remdesivir due to acute kidney injury  Consider convalescent plasma if no  improvement  Patient is full code status and plan of care was discussed with the son the phone and prognosis is guarded  Current Precautions:  Fall  Aspiration    Allergies:  No known food allergies    Past medical history:  Please see H&P for details    Special Studies:  CXR-Patchy airspace opacities bilaterally compatible with multilobar pneumonia  Social/Education/Vocational Hx:  [facility]   Current Risks for Dysphagia & Aspiration: known history of dysphagia and decreased alertness  Current Symptoms/Concerns: change in respiratory status and recent increase in dysphagia  Current Diet: regular diet and thin liquids   Baseline Diet: puree/level 1 diet and nectar thick liquids-recently downgraded at facility as she was refusing po & meds  Poor intake overall  Baseline Assessment   Behavior/Cognition: alert, decreased attention and moving around in bed trying to take off restraints  Speech/Language Status: limited verbal output-some mumbling, not intelligible  Patient Positioning: upright in bed-repositioned mult times  Pain Status/Interventions/Response to Interventions:  No report of or nonverbal indications of pain         Swallow Mechanism Exam  Facial: symmetrical  Labial: unable to test 2/2 limited command following  Lingual: unable to test 2/2 limited command following  Velum: unable to visualize  Mandible: unable to test 2/2 limited command following  Dentition: appears natural  Vocal quality:weak   Volitional Cough: unable to initiate volitional cough   Respiratory Status: on nc  Severely dry oral cavity w/ resistance to oral care    Consistencies Assessed and Performance   Consistencies Administered: thin liquids, nectar thick, honey thick, puree and soft solids attempted w/ pt    Oral Stage: moderate/severe  Pt pulls away from the tsp/cup/straw- eventually allowed tsps puree, liquids but opens the oral cavity & pulls her head back  Poor retrieval  Oral holding noted for most material w/ reduced transfers  Poor mastication w/ mod residue noted when offered additional tsps  Some grimacing & shaking head no at times  Poor oral control of material    Pharyngeal Stage: suspected and moderate  Swallow Mechanics:  Swallowing initiation appeared mod delayed at times  Laryngeal rise was palpated and judged to be reduced, weak  Mild throat clear w/ thin, nt by cup when attempted (though control was poor)  Esophageal Concerns: none reported    Strategies and Efficacy: small sips, tsps  Summary and Recommendations (see above)    Results Reviewed with: patient, RN and MD     Treatment Recommended: as able for support & education  Pt w/ severe cog deficit  Frequency of treatment: as able, perhaps x1    Patient Stated Goal: cannot    Dysphagia LTG  -Patient will demonstrate safe and effective oral intake (without overt s/s significant oral/pharyngeal dysphagia including s/s penetration or aspiration) for the highest appropriate diet level       Speech Therapy Prognosis   Prognosis: poor    Prognosis Considerations: medical status, cognitive status and progressive disease process

## 2020-12-26 NOTE — ASSESSMENT & PLAN NOTE
Patient has baseline dementia  But alert awake oriented x3 at baseline  She walks normal, she can feed herself  Continue dementia care  Currently does not follow commands, difficult stick for blood work

## 2020-12-26 NOTE — ASSESSMENT & PLAN NOTE
Patient has elevated D-dimer  She is on heparin 5000 q 8 hours  Patient cannot get therapeutic Lovenox because she has severe Jordan I  We will get a lower extremity duplex study to rule out DVT

## 2020-12-26 NOTE — ASSESSMENT & PLAN NOTE
COVID 19 on Dec 14, 2020  Patient got diagnosed with COVID-19 on December 14, 2020  After that she was started on multivitamins and Decadron in [facility]  Patient got worse over time  For past few days she is not eating or drinking anything  Her oxygen saturations dropped  That said patient was sent to the hospital   Patient does not use oxygen at baseline  Now she is requiring 6 L of oxygen to maintain saturation  Patient is in acute hypoxic respiratory failure secondary to COVID-19 pneumonia  O2 sats currently improving  Down from 6 L to 4 L via nasal cannula  No home o2 use  Other vitals stable  Labs from today morning pending      Plan:  -- for COVID, continue decadron d3 today  -- remdesivir deferred due to LARA  -- plasma ordered 12/24/2020  -- titrate o2  -- respiratory protocol  -- continue ceftri and doxy d3 today

## 2020-12-26 NOTE — CONSULTS
Consultation - Loraine Dies 68 y o  female MRN: [id number]    Unit/Bed#: -01 Encounter: [id number]      Identifying Data:  68years old black female is admitted at [facility] with shortness of breath  Psychiatric consultation is asked for agitation  Patient examined spoke with the nurse history physical medications labs reviewed and noted  Patient is suffering from severe dementia patient is not capable to provide any meaningful information  Information obtained from the nursing home record talking to the nurse and the medical attending reviewed her history physical   Reportedly patient was severely agitated earlier today and needed to give her Ativan 2 milligram IM once nurse reports that she is refusing the p o  Medication and medically very sick patient was diagnosed COVID-19 on December 14th at the nursing home and currently she is going through the severe shortness of breath secondary to COVID-19 pneumonia and she is being treated for the same  Patient is resting in the bed she looks at me in response to simple verbal command but she is not capable to provide any meaningful information she could not tell me her age date of birth current year current month name of the president she does not know where she is she is not aware of her problems limitations and needs she is not capable to provide any background information patient is a nursing home resident reportedly patient has children and son has the power of       Social history patient is a nursing home resident she is not smoking not abusing alcohol or drugs patient is retired and detail social history is not available at this time patient is not capable to provide that information patient is not capable to provide the background information whether patient has history of drug or alcohol abuse any educational work history etcetera    Diagnosis dementia with behavior problem anxiety hypertension other detail history is not available patient is not capable to provide that information due to severe dementia    Allergy no known drug allergy  Chief Complaints:  Shortness of breath secondary to COVID-19 pneumonia    Family History: History reviewed  No pertinent family history  Not known    Legal History:  Not known    Mental Status Exam:  68years old female is lying in the bed not lethargic at this time stares at me in response to the simple verbal command but does not talk unable to provide any meaningful information as described above  Memory impaired patient is uncooperative impulsive and unpredictable she gets agitated and combative refused to take p o  Medications and unable to redirect her she does not cooperate with medical care including to insert the IV  Patient is anxious depressed guarded paranoid combative and unable to redirect her  Patient is not suicidal probably delirious and hallucinating secondary to medical condition COVID-19 pneumonia delusional poor concentration insight and judgments are impaired      History of Present Illness     HPI: Danuta Cardenas is a 68y o  year old female who presents with shortness of breath secondary to COVID-19 pneumonia    Consults      Historical Information   Past Psychiatric History:  Patient is suffering from dementia with behavior problem and depression as described above she is a nursing home resident and severely demented confused she is not aware of her problems limitations and needs she is not capable to provide any background information whether patient ever had seen a psychiatrist any psychiatric admissions any suicide attempts or not whether patient ever had history of drug and alcohol abuse or any legal problems in the past   I reviewed the nursing home medications list patient is not on psychotropic medications at nursing home at this time    Currently patient is going through the dementia with depression and combative agitated psychotic behavior secondary to delirium I will use psychotropic medications at least for short period of time  Discussed with the medical attending and the nurse  Past Medical History:   Diagnosis Date    Alzheimer's disease ([address] )     Hypertension      History reviewed  No pertinent surgical history  Social History   Social History     Substance and Sexual Activity   Alcohol Use Not Currently     Social History     Substance and Sexual Activity   Drug Use Not Currently     Social History     Tobacco Use   Smoking Status Never Smoker   Smokeless Tobacco Never Used       Meds/Allergies   current meds:   Current Facility-Administered Medications   Medication Dose Route Frequency    acetaminophen (TYLENOL) tablet 650 mg  650 mg Oral Q6H PRN    aluminum-magnesium hydroxide-simethicone (MYLANTA) oral suspension 30 mL  30 mL Oral Q6H PRN    ascorbic acid (VITAMIN C) tablet 1,000 mg  1,000 mg Oral Q12H [facility]    bisacodyl (DULCOLAX) rectal suppository 10 mg  10 mg Rectal Daily PRN    cefTRIAXone (ROCEPHIN) IVPB (premix in dextrose) 1,000 mg 50 mL  1,000 mg Intravenous Q24H    cholecalciferol (VITAMIN D3) tablet 2,000 Units  2,000 Units Oral Daily    dexamethasone (DECADRON) injection 6 mg  6 mg Intravenous Q24H    dextrose 5 % infusion  150 mL/hr Intravenous Continuous    doxycycline hyclate (VIBRAMYCIN) capsule 100 mg  100 mg Oral Q12H    famotidine (PEPCID) tablet 20 mg  20 mg Oral BID    heparin (porcine) subcutaneous injection 5,000 Units  5,000 Units Subcutaneous Q8H [facility]    zinc sulfate (ZINCATE) capsule 220 mg  220 mg Oral Daily    Followed by   Jake Condon ON 12/31/2020] multivitamin-minerals (CENTRUM ADULTS) tablet 1 tablet  1 tablet Oral Daily    ondansetron (ZOFRAN) injection 4 mg  4 mg Intravenous Q6H PRN    and PTA meds:    No medications prior to admission  No Known Allergies    Objective   Vitals: Blood pressure 105/72, pulse 59, temperature (!) 95 1 °F (35 1 °C), temperature source Tympanic, resp   rate 15, height 5' 6" (1 676 m), weight 73 kg (160 lb 15 oz), SpO2 93 %  Routine Lab Results:   Admission on 12/24/2020   Component Date Value Ref Range Status    WBC 12/24/2020 8 99  4 31 - 10 16 Thousand/uL Final    RBC 12/24/2020 5 97* 3 81 - 5 12 Million/uL Final    Hemoglobin 12/24/2020 16 1* 11 5 - 15 4 g/dL Final    Hematocrit 12/24/2020 51 6* 34 8 - 46 1 % Final    MCV 12/24/2020 86  82 - 98 fL Final    MCH 12/24/2020 27 0  26 8 - 34 3 pg Final    MCHC 12/24/2020 31 2* 31 4 - 37 4 g/dL Final    RDW 12/24/2020 14 8  11 6 - 15 1 % Final    MPV 12/24/2020 10 5  8 9 - 12 7 fL Final    Platelets 97/15/0953 392* 149 - 390 Thousands/uL Final    Sodium 12/24/2020 152* 133 - 145 mmol/L Final    Potassium 12/24/2020 4 5  3 5 - 5 0 mmol/L Final    Chloride 12/24/2020 111* 96 - 108 mmol/L Final    CO2 12/24/2020 24  22 - 33 mmol/L Final    ANION GAP 12/24/2020 17* 4 - 13 mmol/L Final    BUN 12/24/2020 182* 6 - 20 mg/dL Final    Creatinine 12/24/2020 4 20* 0 40 - 1 10 mg/dL Final    Standardized to IDMS reference method    Glucose 12/24/2020 142* 65 - 140 mg/dL Final    If the patient is fasting, the ADA then defines impaired fasting glucose as > 100 mg/dL and diabetes as > or equal to 123 mg/dL  Specimen collection should occur prior to Sulfasalazine administration due to the potential for falsely depressed results  Specimen collection should occur prior to Sulfapyridine administration due to the potential for falsely elevated results   Calcium 12/24/2020 9 4  8 4 - 10 2 mg/dL Final    AST 12/24/2020 49* 15 - 41 U/L Final    Specimen collection should occur prior to Sulfasalazine administration due to the potential for falsely depressed results   ALT 12/24/2020 36  5 - 54 U/L Final    Specimen collection should occur prior to Sulfasalazine administration due to the potential for falsely depressed results       Alkaline Phosphatase 12/24/2020 62 5  35 - 140 U/L Final    Total Protein 12/24/2020 9 1* 6 4 - 8 3 g/dL Final    Albumin 12/24/2020 4 1  3 4 - 4 8 g/dL Final    Total Bilirubin 12/24/2020 0 49  0 30 - 1 20 mg/dL Final    eGFR 12/24/2020 10  ml/min/1 73sq m Final    LACTIC ACID 12/24/2020 1 4  0 0 - 2 0 mmol/L Final    Blood Culture 12/24/2020 No Growth at 24 hrs  Preliminary    Blood Culture 12/24/2020 No Growth at 24 hrs  Preliminary    Magnesium 12/24/2020 4 2* 1 6 - 2 6 mg/dL Final    CRP 12/24/2020 20 0* 0 0 - 1 0 mg/L Final    BNP 12/24/2020 22 8  1 - 100 pg/mL Final    Troponin I 12/24/2020 0 06  0 00 - 0 07 ng/mL Final    Results indicate test should be repeated on new specimen collected within 4-6 hours of the original  [employer] Chemistry analyzer 99% cutoff is > 0 03ng/mL    o cTnl 99% cutoff is useful only when applied to patients in the clinical setting of myocardial ischemia  o cTnl 99% cutoff should be interpreted in the context of clinical history, ECG findings and possibly cardiac imaging to establish correct diagnosis  o cTnl 99% cutoff may be suggestive but clearly not indicative of a coronary event without the clinical setting of myocardial ischemia      Total CK 12/24/2020 1,186 2* 38 - 234 U/L Final    POC Glucose 12/24/2020 143* 65 - 140 mg/dl Final    ph, Jt ISTAT 12/24/2020 7 439* 7 300 - 7 400 Final    pCO2, Jt i-STAT 12/24/2020 34 2* 42 0 - 50 0 mm HG Final    pO2, Jt i-STAT 12/24/2020 40 0  35 0 - 45 0 mm HG Final    BE, i-STAT 12/24/2020 0  -2 - 3 mmol/L Final    HCO3, Jt i-STAT 12/24/2020 23 2* 24 0 - 30 0 mmol/L Final    CO2, i-STAT 12/24/2020 24  21 - 32 mmol/L Final    O2 Sat, i-STAT 12/24/2020 78  60 - 85 % Final    SODIUM, I-STAT 12/24/2020 151* 136 - 145 mmol/l Final    Potassium, i-STAT 12/24/2020 5 1  3 5 - 5 3 mmol/L Final    Calcium, Ionized i-STAT 12/24/2020 1 03* 1 12 - 1 32 mmol/L Final    Hct, i-STAT 12/24/2020 48* 34 8 - 46 1 % Final    Hgb, i-STAT 12/24/2020 16 3* 11 5 - 15 4 g/dl Final    Glucose, i-STAT 12/24/2020 135  65 - 140 mg/dl Final    Specimen Type 12/24/2020 VENOUS   Final    CK-MB Index 12/24/2020 <1 0  0 0 - 2 5 % Final    CK-MB 12/24/2020 2 9  0 1 - 5 9 ng/mL Final    Segmented % 12/24/2020 63  43 - 75 % Final    Bands % 12/24/2020 22* 0 - 8 % Final    Lymphocytes % 12/24/2020 10* 14 - 44 % Final    Monocytes % 12/24/2020 5  4 - 12 % Final    Eosinophils, % 12/24/2020 0  0 - 6 % Final    Basophils % 12/24/2020 0  0 - 1 % Final    Absolute Neutrophils 12/24/2020 7 64* 1 85 - 7 62 Thousand/uL Final    Lymphocytes Absolute 12/24/2020 0 90  0 60 - 4 47 Thousand/uL Final    Monocytes Absolute 12/24/2020 0 45  0 00 - 1 22 Thousand/uL Final    Eosinophils Absolute 12/24/2020 0 00  0 00 - 0 40 Thousand/uL Final    Basophils Absolute 12/24/2020 0 00  0 00 - 0 10 Thousand/uL Final    Total Counted 12/24/2020 100   Final    Toxic Granulation 12/24/2020 Present   Final    RBC Morphology 12/24/2020 Normal   Final    Platelet Estimate 09/17/1734 Increased* Adequate Final    D-Dimer, Quant  12/24/2020 11 92* 0 19 - 0 49 mg/L FEU Final    Reference and upper limits to exclude DVT and PE are the same  Do not use to exclude if clinical symptoms are present      Vitamin B-12 12/24/2020 1,075* 100 - 900 pg/mL Final    ABO Grouping 12/24/2020 O   Final    Rh Factor 12/24/2020 Positive   Final    Antibody Screen 12/24/2020 Negative   Final    Specimen Expiration Date 12/24/2020 12/28/2020   Final    Sodium 12/24/2020 154* 133 - 145 mmol/L Final    Potassium 12/24/2020 4 3  3 5 - 5 0 mmol/L Final    Chloride 12/24/2020 116* 96 - 108 mmol/L Final    CO2 12/24/2020 24  22 - 33 mmol/L Final    ANION GAP 12/24/2020 14* 4 - 13 mmol/L Final    BUN 12/24/2020 178* 6 - 20 mg/dL Final    Creatinine 12/24/2020 3 93* 0 40 - 1 10 mg/dL Final    Standardized to IDMS reference method    Glucose 12/24/2020 122  65 - 140 mg/dL Final    If the patient is fasting, the ADA then defines impaired fasting glucose as > 100 mg/dL and diabetes as > or equal to 123 mg/dL  Specimen collection should occur prior to Sulfasalazine administration due to the potential for falsely depressed results  Specimen collection should occur prior to Sulfapyridine administration due to the potential for falsely elevated results      Calcium 12/24/2020 8 6  8 4 - 10 2 mg/dL Final    eGFR 12/24/2020 10  ml/min/1 73sq m Final    ABO Grouping 12/24/2020 O   Final    Rh Factor 12/24/2020 Positive   Final    Clarity, UA 12/24/2020 Slightly Cloudy* Clear Final    Color, UA 12/24/2020 Yellow  Yellow Final    Specific [place], UA 12/24/2020 1 025  1 001 - 1 030 Final    pH, UA 12/24/2020 5 5  5 0, 5 5, 6 0, 6 5, 7 0, 7 5, 8 0 Final    Glucose, UA 12/24/2020 Negative  Negative mg/dl Final    Ketones, UA 12/24/2020 Negative  Negative mg/dl Final    Blood, UA 12/24/2020 2+* Negative Final    Protein, UA 12/24/2020 1+* Negative, Interference- unable to analyze mg/dl Final    Nitrite, UA 12/24/2020 Positive* Negative Final    Bilirubin, UA 12/24/2020 Negative  Negative Final    Urobilinogen, UA 12/24/2020 0 2  0 2, 1 0 E U /dl E U /dl Final    Leukocytes, UA 12/24/2020 2+* Negative Final    WBC, UA 12/24/2020 Innumerable* None Seen, 0-1, 1-2, 0-5, 2-4 /hpf Final    RBC, UA 12/24/2020 10-20* None Seen, 0-1, 1-2, 2-4, 0-5 /hpf Final    Bacteria, UA 12/24/2020 Innumerable* None Seen, Occasional /hpf Final    AMORPH PHOSPATES 12/24/2020 Occasional  /hpf Final    Epithelial Cells 12/24/2020 Moderate* None Seen, Occasional /hpf Final    Sodium 12/25/2020 152* 133 - 145 mmol/L Final    Potassium 12/25/2020 4 7  3 5 - 5 0 mmol/L Final    Chloride 12/25/2020 114* 96 - 108 mmol/L Final    CO2 12/25/2020 24  22 - 33 mmol/L Final    ANION GAP 12/25/2020 14* 4 - 13 mmol/L Final    BUN 12/25/2020 164* 6 - 20 mg/dL Final    Creatinine 12/25/2020 3 08* 0 40 - 1 10 mg/dL Final    Standardized to IDMS reference method    Glucose 12/25/2020 200* 65 - 140 mg/dL Final    If the patient is fasting, the ADA then defines impaired fasting glucose as > 100 mg/dL and diabetes as > or equal to 123 mg/dL  Specimen collection should occur prior to Sulfasalazine administration due to the potential for falsely depressed results  Specimen collection should occur prior to Sulfapyridine administration due to the potential for falsely elevated results   Calcium 12/25/2020 9 1  8 4 - 10 2 mg/dL Final    AST 12/25/2020 37  15 - 41 U/L Final    Specimen collection should occur prior to Sulfasalazine administration due to the potential for falsely depressed results   ALT 12/25/2020 28  5 - 54 U/L Final    Specimen collection should occur prior to Sulfasalazine administration due to the potential for falsely depressed results   Alkaline Phosphatase 12/25/2020 52 2  35 - 140 U/L Final    Total Protein 12/25/2020 8 0  6 4 - 8 3 g/dL Final    Albumin 12/25/2020 3 6  3 4 - 4 8 g/dL Final    Total Bilirubin 12/25/2020 0 41  0 30 - 1 20 mg/dL Final    eGFR 12/25/2020 14  ml/min/1 73sq m Final    WBC 12/25/2020 6 31  4 31 - 10 16 Thousand/uL Final    RBC 12/25/2020 5 17* 3 81 - 5 12 Million/uL Final    Hemoglobin 12/25/2020 14 1  11 5 - 15 4 g/dL Final    Hematocrit 12/25/2020 45 8  34 8 - 46 1 % Final    MCV 12/25/2020 89  82 - 98 fL Final    MCH 12/25/2020 27 3  26 8 - 34 3 pg Final    MCHC 12/25/2020 30 8* 31 4 - 37 4 g/dL Final    RDW 12/25/2020 14 8  11 6 - 15 1 % Final    MPV 12/25/2020 10 5  8 9 - 12 7 fL Final    Platelets 96/10/1039 351  149 - 390 Thousands/uL Final    D-Dimer, Quant  12/25/2020 9 09* 0 19 - 0 49 mg/L FEU Final    Reference and upper limits to exclude DVT and PE are the same  Do not use to exclude if clinical symptoms are present      Total CK 12/25/2020 1,649 4* 38 - 234 U/L Final    CK-MB Index 12/25/2020 <1 0  0 0 - 2 5 % Final    CK-MB 12/25/2020 6 0* 0 1 - 5 9 ng/mL Final    Segmented % 12/25/2020 84* 43 - 75 % Final    Bands % 12/25/2020 6  0 - 8 % Final    Lymphocytes % 12/25/2020 8* 14 - 44 % Final    Monocytes % 12/25/2020 0* 4 - 12 % Final    Eosinophils, % 12/25/2020 0  0 - 6 % Final    Basophils % 12/25/2020 1  0 - 1 % Final    Atypical Lymphocytes % 12/25/2020 1* <=0 % Final    Absolute Neutrophils 12/25/2020 5 68  1 85 - 7 62 Thousand/uL Final    Lymphocytes Absolute 12/25/2020 0 50* 0 60 - 4 47 Thousand/uL Final    Monocytes Absolute 12/25/2020 0 00  0 00 - 1 22 Thousand/uL Final    Eosinophils Absolute 12/25/2020 0 00  0 00 - 0 40 Thousand/uL Final    Basophils Absolute 12/25/2020 0 06  0 00 - 0 10 Thousand/uL Final    Total Counted 12/25/2020 100   Final    RBC Morphology 12/25/2020 Normal   Final    Platelet Estimate 19/34/1490 Adequate  Adequate Final    Ventricular Rate 12/24/2020 97  BPM Final    Atrial Rate 12/24/2020 97  BPM Final    OK Interval 12/24/2020 142  ms Final    QRSD Interval 12/24/2020 94  ms Final    QT Interval 12/24/2020 353  ms Final    QTC Interval 12/24/2020 449  ms Final    P Axis 12/24/2020 69  degrees Final    QRS Axis 12/24/2020 -43  degrees Final    T Wave Axis 12/24/2020 34  degrees Final         Diagnosis:  Severe dementia with behavior problem depression and psychosis  Anxiety  Delirium secondary to medical condition    Plan:  Remeron 7 5 milligram HS  Risperdal 0 25 milligram p o  B i d  for short period of time  Haldol 2 milligram IM q 6 hours p r n  For agitation psychosis for short period of time  Psychotherapy  Psychiatric follow-up recommended at the nursing home after the discharge  Discussed with the medical attending and the nurse  I will follow up during the hospital stay      Ebonie Magana MD

## 2020-12-26 NOTE — ASSESSMENT & PLAN NOTE
Sodium is 152 on admission, down trending to 148 yesterday  BMP from AM pending  Nephro following  Currently continued on half NS @ 150 as per nephro

## 2020-12-27 LAB
ANION GAP SERPL CALCULATED.3IONS-SCNC: 15 MMOL/L (ref 4–13)
BUN SERPL-MCNC: 119 MG/DL (ref 6–20)
CALCIUM SERPL-MCNC: 9.1 MG/DL (ref 8.4–10.2)
CHLORIDE SERPL-SCNC: 112 MMOL/L (ref 96–108)
CK MB SERPL-MCNC: 7.1 NG/ML (ref 0.1–5.9)
CK MB SERPL-MCNC: <1 % (ref 0–2.5)
CK SERPL-CCNC: 851.3 U/L (ref 38–234)
CO2 SERPL-SCNC: 23 MMOL/L (ref 22–33)
CREAT SERPL-MCNC: 1.9 MG/DL (ref 0.4–1.1)
GFR SERPL CREATININE-BSD FRML MDRD: 25 ML/MIN/1.73SQ M
GLUCOSE SERPL-MCNC: 131 MG/DL (ref 65–140)
MRSA NOSE QL CULT: NORMAL
POTASSIUM SERPL-SCNC: 4.8 MMOL/L (ref 3.5–5)
SODIUM SERPL-SCNC: 150 MMOL/L (ref 133–145)

## 2020-12-27 PROCEDURE — 82553 CREATINE MB FRACTION: CPT | Performed by: INTERNAL MEDICINE

## 2020-12-27 PROCEDURE — 36410 VNPNXR 3YR/> PHY/QHP DX/THER: CPT | Performed by: PHYSICIAN ASSISTANT

## 2020-12-27 PROCEDURE — 05HB33Z INSERTION OF INFUSION DEVICE INTO RIGHT BASILIC VEIN, PERCUTANEOUS APPROACH: ICD-10-PCS | Performed by: INTERNAL MEDICINE

## 2020-12-27 PROCEDURE — 82550 ASSAY OF CK (CPK): CPT | Performed by: INTERNAL MEDICINE

## 2020-12-27 PROCEDURE — 80048 BASIC METABOLIC PNL TOTAL CA: CPT | Performed by: INTERNAL MEDICINE

## 2020-12-27 PROCEDURE — NC001 PR NO CHARGE: Performed by: PHYSICIAN ASSISTANT

## 2020-12-27 PROCEDURE — NC001 PR NO CHARGE: Performed by: INTERNAL MEDICINE

## 2020-12-27 PROCEDURE — 99233 SBSQ HOSP IP/OBS HIGH 50: CPT | Performed by: INTERNAL MEDICINE

## 2020-12-27 RX ADMIN — FAMOTIDINE 20 MG: 20 TABLET ORAL at 17:27

## 2020-12-27 RX ADMIN — ZINC SULFATE 220 MG (50 MG) CAPSULE 220 MG: CAPSULE at 12:41

## 2020-12-27 RX ADMIN — HEPARIN SODIUM 5000 UNITS: 5000 INJECTION INTRAVENOUS; SUBCUTANEOUS at 22:50

## 2020-12-27 RX ADMIN — RISPERIDONE 0.25 MG: 0.25 TABLET ORAL at 17:27

## 2020-12-27 RX ADMIN — RISPERIDONE 0.25 MG: 0.25 TABLET ORAL at 09:14

## 2020-12-27 RX ADMIN — DEXTROSE 150 ML/HR: 5 SOLUTION INTRAVENOUS at 09:38

## 2020-12-27 RX ADMIN — Medication 2000 UNITS: at 12:41

## 2020-12-27 RX ADMIN — HEPARIN SODIUM 5000 UNITS: 5000 INJECTION INTRAVENOUS; SUBCUTANEOUS at 06:38

## 2020-12-27 RX ADMIN — OXYCODONE HYDROCHLORIDE AND ACETAMINOPHEN 1000 MG: 500 TABLET ORAL at 12:40

## 2020-12-27 RX ADMIN — CEFTRIAXONE 1000 MG: 1 INJECTION, SOLUTION INTRAVENOUS at 15:20

## 2020-12-27 RX ADMIN — DEXAMETHASONE SODIUM PHOSPHATE 6 MG: 4 INJECTION, SOLUTION INTRAMUSCULAR; INTRAVENOUS at 15:20

## 2020-12-27 RX ADMIN — HEPARIN SODIUM 5000 UNITS: 5000 INJECTION INTRAVENOUS; SUBCUTANEOUS at 15:19

## 2020-12-27 RX ADMIN — FAMOTIDINE 20 MG: 20 TABLET ORAL at 09:14

## 2020-12-27 RX ADMIN — DEXTROSE 200 ML/HR: 5 SOLUTION INTRAVENOUS at 16:50

## 2020-12-27 NOTE — ASSESSMENT & PLAN NOTE
COVID 19 on Dec 14, 2020  Patient got diagnosed with COVID-19 on December 14, 2020  After that she was started on multivitamins and Decadron in [facility] [place]  Patient got worse over time  For past few days she is not eating or drinking anything  Her oxygen saturations dropped  That's why  patient was sent to the hospital   Patient does not use oxygen at baseline  Now she is requiring 6 L of oxygen to maintain saturation  Patient is in acute hypoxic respiratory failure secondary to COVID-19 pneumonia  O2 sats currently improving  Down from 6 L to 4 L via nasal cannula  No home o2 use  Other vitals stable    See the remaining plan under COVID pneumonia

## 2020-12-27 NOTE — PROGRESS NOTES
Progress Note - Asha Obrien 1940, 68 y o  female MRN: [id number]    Unit/Bed#: -01 Encounter: [id number]    Primary Care Provider: No primary care provider on file  Date and time admitted to hospital: 12/24/2020 12:01 PM        Acute respiratory failure with hypoxia [facility])  Assessment & Plan  COVID 19 on Dec 14, 2020  Patient got diagnosed with COVID-19 on December 14, 2020  After that she was started on multivitamins and Decadron in [facility]  Patient got worse over time  For past few days she is not eating or drinking anything  Her oxygen saturations dropped  That's why  patient was sent to the hospital   Patient does not use oxygen at baseline  Now she is requiring 6 L of oxygen to maintain saturation  Patient is in acute hypoxic respiratory failure secondary to COVID-19 pneumonia  O2 sats currently improving  Down from 6 L to 4 L via nasal cannula  No home o2 use  Other vitals stable  See the remaining plan under COVID pneumonia    * Pneumonia due to COVID-19 virus  Assessment & Plan  Patient was found COVID positive on December 14, 2020  She was started with Decadron and multivitamins at nursing home  Her conditions got worse  Today she was found to have low oxygen saturation  Nursing home staff told that patient is not eating or drinking anything for couple of days  Chest x-ray is evident for pneumonia  Patient is requiring 6 L of oxygen  Will start on moderate COVID-19 positive  Continue on antibiotics, anticoagulation, steroids, multivitamins  Patient has severe LARA  Hold remdisivir  Self Proning  We talked to son in detail, he want patient to be full code  Continue to monitor closely      LARA (acute kidney injury) [facility])  Assessment & Plan  Creatinine on admission on 4 08, likely prerenal because of inadequate p o  Intake as reported by the nursing home  Improved yesterday up to 3 6  Left from a m  Today pending      Plan:  --nephrology following, continue to trend BMP  --continue half-normal saline at 150 cc/hour as per Nephrology recommendation  --decent Uop    Hypernatremia  Assessment & Plan  Sodium is 152 on admission, today sodium is 150  Nephro following  Currently continued on half NS @ 200 as per nephro      Rhabdomyolysis  Assessment & Plan  CK level is trending down  Continue IV fluid to D5 water to 200 cc an hour  Nephrology on board  COVID-19  Assessment & Plan  See above    Elevated d-dimer  Assessment & Plan  Patient has elevated D-dimer  She is on heparin 5000 q 8 hours  Patient cannot get therapeutic Lovenox because she has severe Jordan I  We will get a lower extremity duplex study to rule out DVT  UTI (urinary tract infection)  Assessment & Plan  Urinalysis is dirty  Follow the urine culture  Patient is on ceftriaxone  No fever, no leukocytosis  Metabolic encephalopathy  Assessment & Plan  Patient has hypernatremia, acute kidney injury, leading to metabolic encephalopathy  At baseline patient is alert awake oriented x3  Today patient is mumbling, she is awake but disoriented  CT of the head is negative  On 2 point restraints  Alzheimer's disease [facility])  Assessment & Plan  Patient has baseline dementia  But alert awake oriented x3 at baseline  She walks normal, she can feed herself  Continue dementia care  Currently does not follow commands, difficult stick for blood work  We will put the midline  Hypertension  Assessment & Plan  Patient has history of hypertension, she takes losartan  Now on hold 2/2 jordan      VTE Pharmacologic Prophylaxis:   Pharmacologic: Heparin  Mechanical VTE Prophylaxis in Place: Yes    Current Length of Stay: 3 day(s)    Current Patient Status: Inpatient       Code Status: Level 1 - Full Code      Subjective: Today I saw and examined the patient at bedside  Patient is awake and mumbling  She is disoriented  She is tender all over  Patient was not able to answer appropriately  She is breathing on 4 L of oxygen  Patient is on 2 points restrained  I talked to son in detail  All a questions are answered  Objective:     Vitals:   Temp (24hrs), Av 3 °F (36 3 °C), Min:96 3 °F (35 7 °C), Max:97 8 °F (36 6 °C)    Temp:  [96 3 °F (35 7 °C)-97 8 °F (36 6 °C)] 97 4 °F (36 3 °C)  HR:  [68-77] 71  Resp:  [16-18] 18  BP: (108-125)/(68-72) 125/71  SpO2:  [98 %-99 %] 98 %  Body mass index is 25 98 kg/m²  Input and Output Summary (last 24 hours): Intake/Output Summary (Last 24 hours) at 2020 1120  Last data filed at 2020 0601  Gross per 24 hour   Intake 60 ml   Output 300 ml   Net -240 ml       Physical Exam:     Physical Exam    Vitals signs and nursing note reviewed  Constitutional:       General: She is in acute distress  Appearance: She is well-developed  She is ill-appearing  Breathing on 6 L of oxygen via nasal cannula  HENT:      Head: Normocephalic and atraumatic  Mouth/Throat:      Mouth: Mucous membranes are dry  Eyes:      Conjunctiva/sclera: Conjunctivae normal    Neck:      Musculoskeletal: Neck supple  Cardiovascular:      Rate and Rhythm: Normal rate and regular rhythm  Heart sounds: No murmur  Pulmonary:      Effort: Respiratory distress present  Breath sounds: Normal breath sounds  Decreased air movement present  Abdominal:      Palpations: Abdomen is soft  Tenderness: There is no abdominal tenderness  Musculoskeletal:      Comments: Tender all over   Skin:     General: Skin is warm and dry  Capillary Refill: Capillary refill takes less than 2 seconds  Neurological:      General: No focal deficit present  Mental Status: She is alert  She is disoriented  Comments: Patient is moving extremities spontaneously  CT head negative    She is on restraints    Additional Data:     Labs:    Results from last 7 days   Lab Units 20  1211   WBC Thousand/uL 9 13   HEMOGLOBIN g/dL 14 0   HEMATOCRIT % 44 3 PLATELETS Thousands/uL 356   LYMPHO PCT % 4*   MONO PCT % 3*   EOS PCT % 0     Results from last 7 days   Lab Units 12/27/20  0426 12/26/20  1211  12/24/20  1307   POTASSIUM mmol/L 4 8 4 2   < >  --    CHLORIDE mmol/L 112* 116*   < >  --    CO2 mmol/L 23 24   < >  --    CO2, I-STAT mmol/L  --   --   --  24   BUN mg/dL 119* 125*   < >  --    CREATININE mg/dL 1 90* 2 01*   < >  --    CALCIUM mg/dL 9 1 9 0   < >  --    ALK PHOS U/L  --  47 8   < >  --    ALT U/L  --  26   < >  --    AST U/L  --  33   < >  --    GLUCOSE, ISTAT mg/dl  --   --   --  135    < > = values in this interval not displayed  * I Have Reviewed All Lab Data Listed Above  * Additional Pertinent Lab Tests Reviewed: [place] 68 Admission Reviewed      Recent Cultures (last 7 days):     Results from last 7 days   Lab Units 12/24/20  1247   BLOOD CULTURE  No Growth at 48 hrs  No Growth at 48 hrs         Last 24 Hours Medication List:   Current Facility-Administered Medications   Medication Dose Route Frequency Provider Last Rate    acetaminophen  650 mg Oral Q6H PRN Prema Hutchins MD      aluminum-magnesium hydroxide-simethicone  30 mL Oral Q6H PRN Prema Hutchins MD      ascorbic acid  1,000 mg Oral Q12H [facility] Prema Hutchins MD      bisacodyl  10 mg Rectal Daily PRN Prema Hutchins MD      cefTRIAXone  1,000 mg Intravenous Q24H Prema Hutchins MD 1,000 mg (12/26/20 5597)    cholecalciferol  2,000 Units Oral Daily Prema Hutchins MD      dexamethasone  6 mg Intravenous Q24H Prema Hutchins MD      dextrose  200 mL/hr Intravenous Continuous Garett Patricia  mL/hr (12/27/20 4245)    famotidine  20 mg Oral BID Prema Hutchins MD      haloperidol lactate  2 mg Intramuscular Q6H PRN Dylan Cruz MD      heparin (porcine)  5,000 Units Subcutaneous [facility] Prema Hutchins MD      mirtazapine  7 5 mg Oral HS Dylan Cruz MD      zinc sulfate  220 mg Oral Daily Prema Hutchins MD      Followed by   Ashanti Segura ON 12/31/2020] multivitamin-minerals  1 tablet Oral Daily Nick Hope MD      ondansetron  4 mg Intravenous Q6H PRN Nick Hope MD      risperiDONE  0 25 mg Oral BID Jnaice Velez MD          Today, Patient Was Seen By: Nick Hope MD    ** Please Note: This note has been constructed using a voice recognition system   **

## 2020-12-27 NOTE — ASSESSMENT & PLAN NOTE
Patient has baseline dementia  But alert awake oriented x3 at baseline  She walks normal, she can feed herself  Continue dementia care  Currently does not follow commands, difficult stick for blood work  We will put the midline

## 2020-12-27 NOTE — PROGRESS NOTES
Patient examined spoke with the nurse  Patient remained confused demented resting in the bed not lethargic she looks calmer and not in distress no meaningful communication inappropriate response to verbal command patient is disoriented not aware of her problems limitations and needs due to severe dementia  Nurse reports that she was able to take her medication Risperdal but she did not need the Haldol p r n  She is still resistive to eat but she took little bit by mouth  Patient is a total care she needs help with ADL care patient is from the nursing home  I reviewed her medications  Patient is started on Risperdal Remeron and Haldol p r n  Yesterday for severe behavior problem agitation and combativeness  Patient will benefit from psychiatric follow-up after the discharge  Patient is not suicidal she cannot take care of herself medical treatment is in progress  No new behavior problem reported at this time I will continue her current medications the same at this time and follow-up

## 2020-12-27 NOTE — PLAN OF CARE
Problem: Potential for Falls  Goal: Patient will remain free of falls  Description: INTERVENTIONS:  - Assess patient frequently for physical needs  -  Identify cognitive and physical deficits and behaviors that affect risk of falls  -  [place] fall precautions as indicated by assessment   - Educate patient/family on patient safety including physical limitations  - Instruct patient to call for assistance with activity based on assessment  - Modify environment to reduce risk of injury  - Consider OT/PT consult to assist with strengthening/mobility  Outcome: Progressing     Problem: Prexisting or High Potential for Compromised Skin Integrity  Goal: Skin integrity is maintained or improved  Description: INTERVENTIONS:  - Identify patients at risk for skin breakdown  - Assess and monitor skin integrity  - Assess and monitor nutrition and hydration status  - Monitor labs   - Assess for incontinence   - Turn and reposition patient  - Assist with mobility/ambulation  - Relieve pressure over bony prominences  - Avoid friction and shearing  - Provide appropriate hygiene as needed including keeping skin clean and dry  - Evaluate need for skin moisturizer/barrier cream  - Collaborate with interdisciplinary team   - Patient/family teaching  - Consider wound care consult   Outcome: Progressing     Problem: Nutrition/Hydration-ADULT  Goal: Nutrient/Hydration intake appropriate for improving, restoring or maintaining nutritional needs  Description: Monitor and assess patient's nutrition/hydration status for malnutrition  Collaborate with interdisciplinary team and initiate plan and interventions as ordered  Monitor patient's weight and dietary intake as ordered or per policy  Utilize nutrition screening tool and intervene as necessary  Determine patient's food preferences and provide high-protein, high-caloric foods as appropriate       INTERVENTIONS:  - Monitor oral intake, urinary output, labs, and treatment plans  - Assess nutrition and hydration status and recommend course of action  - Evaluate amount of meals eaten  - Assist patient with eating if necessary   - Allow adequate time for meals  - Recommend/ encourage appropriate diets, oral nutritional supplements, and vitamin/mineral supplements  - Order, calculate, and assess calorie counts as needed  - Recommend, monitor, and adjust tube feedings and TPN/PPN based on assessed needs  - Assess need for intravenous fluids  - Provide specific nutrition/hydration education as appropriate  - Include patient/family/caregiver in decisions related to nutrition  Outcome: Progressing

## 2020-12-27 NOTE — ASSESSMENT & PLAN NOTE
Sodium is 152 on admission  Nephro following  Today sodium is 140, discontinue IV fluids  Trend BMP tomorrow

## 2020-12-27 NOTE — ASSESSMENT & PLAN NOTE
CK level is trending down  Discontinue IV fluids today  We will repeat CK level  Encourage free water intake

## 2020-12-27 NOTE — PROGRESS NOTES
NEPHROLOGY PROGRESS NOTE   Mervin Dunaway 68 y o  female MRN: [id number]  Unit/Bed#: -01 Encounter: [id number]  Reason for Consult: LARA      SUMMARY:    56-year-old female with a history of dementia, hypertension with recently diagnosed COVID to admitted for hypoxia and shortness of breath  Nephrology consult for hyponatremia and acute kidney injury  Patient seen at bedside by me in full PPE with nurse    ASSESSMENT and PLAN:    Acute Kidney Injury  -- Present on admission (POA) ; admission creatinine: 4 2 mg/dL (no baseline creatinine known)  -- Urinalysis:  10-20 RBC, 2+ blood, 1+ protein, innumerable bacteria innumerable WBC  -- Imaging: no renal imaging  Not clinically indicated  -- Serologies: n/a  -- Etiology:  Suspect severe prerenal azotemia/volume depletion with possible component of acute tubular necrosis in the setting of COVID-19 sepsis  CPK to 1600 from 1100  Not critically elevated  Was also recently on an angiotensin receptor blocker  -- Status:  Renal function continues to improve creatinine down to 1 9 mg/dL    CPK elevated, improving  -- Plan:   · Increase hypoteonic IVF, encourage free water intake  · Daily basic metabolic panel  · Discussed with the team  · Hold statin at this time  · Continue to hold losartan     Elevated CPK level  --I stopped statin  --can consider a more hydrophilic statin such as pravastatin if needed     Hypernatremia  --poor free intake  --no improvement currently on normal saline and D5 water  --increase D5W to 200 cc/hr  --encoruage free water intake      SUBJECTIVE / INTERVAL HISTORY:    No events    OBJECTIVE:  Current Weight: Weight - Scale: 73 kg (160 lb 15 oz)  Vitals:    12/26/20 2335 12/27/20 0500 12/27/20 0817 12/27/20 1248   BP: 108/68 110/71 125/71 112/96   BP Location: Left arm Left arm Right arm Right leg   Pulse: 70 71     Resp: 17 17 18    Temp: 97 6 °F (36 4 °C) 97 8 °F (36 6 °C) (!) 97 4 °F (36 3 °C) (!) 97 °F (36 1 °C)   TempSrc: Tympanic Tympanic Tympanic Axillary   SpO2: 98% 98%     Weight:       Height:           Intake/Output Summary (Last 24 hours) at 12/27/2020 1304  Last data filed at 12/27/2020 0601  Gross per 24 hour   Intake 60 ml   Output 300 ml   Net -240 ml       Review of Systems:    Could not obtain good ROS    Physical Exam  Vitals signs and nursing note reviewed  Exam conducted with a chaperone present  Constitutional:       General: She is not in acute distress  Appearance: She is normal weight  She is ill-appearing  She is not toxic-appearing or diaphoretic  HENT:      Head: Normocephalic and atraumatic  Eyes:      General: No scleral icterus  Right eye: No discharge  Left eye: No discharge  Neck:      Musculoskeletal: Normal range of motion  Cardiovascular:      Rate and Rhythm: Normal rate  Pulmonary:      Effort: Pulmonary effort is normal  No respiratory distress  Abdominal:      General: Abdomen is flat  There is no distension  Tenderness: There is no guarding  Genitourinary:     Comments: lou  Musculoskeletal:      Right lower leg: No edema  Left lower leg: No edema  Skin:     General: Skin is dry  Coloration: Skin is not jaundiced or pale  Findings: No bruising, erythema, lesion or rash  Neurological:      Mental Status: She is alert           Medications:    Current Facility-Administered Medications:     acetaminophen (TYLENOL) tablet 650 mg, 650 mg, Oral, Q6H PRN, Yaritza Burk MD    aluminum-magnesium hydroxide-simethicone (MYLANTA) oral suspension 30 mL, 30 mL, Oral, Q6H PRN, Yaritza Burk MD    ascorbic acid (VITAMIN C) tablet 1,000 mg, 1,000 mg, Oral, Q12H [facility] [place] HOME, Yaritza Burk MD, 1,000 mg at 12/27/20 1240    bisacodyl (DULCOLAX) rectal suppository 10 mg, 10 mg, Rectal, Daily PRN, Yaritza Burk MD    cefTRIAXone (ROCEPHIN) IVPB (premix in dextrose) 1,000 mg 50 mL, 1,000 mg, Intravenous, Q24H, Yaritza Burk MD, Last Rate: 100 mL/hr at 12/26/20 1517, 1,000 mg at 12/26/20 1517    cholecalciferol (VITAMIN D3) tablet 2,000 Units, 2,000 Units, Oral, Daily, Rio Urena MD, 2,000 Units at 12/27/20 1241    dexamethasone (DECADRON) injection 6 mg, 6 mg, Intravenous, Q24H, Rio Urena MD, 6 mg at 12/26/20 1516    dextrose 5 % infusion, 200 mL/hr, Intravenous, Continuous, Kimberli Foley MD, Last Rate: 200 mL/hr at 12/27/20 1241, 200 mL/hr at 12/27/20 1241    famotidine (PEPCID) tablet 20 mg, 20 mg, Oral, BID, Rio Urena MD, 20 mg at 12/27/20 0914    haloperidol lactate (HALDOL) injection 2 mg, 2 mg, Intramuscular, Q6H PRN, Trina Melo MD, Stopped at 12/27/20 1241    heparin (porcine) subcutaneous injection 5,000 Units, 5,000 Units, Subcutaneous, Q8H [address], Rio Urena MD, 5,000 Units at 12/27/20 1351    mirtazapine (REMERON) tablet 7 5 mg, 7 5 mg, Oral, HS, Trina Melo MD    zinc sulfate (ZINCATE) capsule 220 mg, 220 mg, Oral, Daily, 220 mg at 12/27/20 1241 **FOLLOWED BY** [START ON 12/31/2020] multivitamin-minerals (CENTRUM ADULTS) tablet 1 tablet, 1 tablet, Oral, Daily, Rio Urena MD    ondansetron [facility]) injection 4 mg, 4 mg, Intravenous, Q6H PRN, Rio Urena MD    risperiDONE (RisperDAL) tablet 0 25 mg, 0 25 mg, Oral, BID, Trina Melo MD, 0 25 mg at 12/27/20 0914    Laboratory Results:  Results from last 7 days   Lab Units 12/27/20  0426 12/26/20  1211 12/25/20  0654 12/24/20  1842 12/24/20  1307 12/24/20  1247   WBC Thousand/uL  --  9 13 6 31  --   --  8 99   HEMOGLOBIN g/dL  --  14 0 14 1  --   --  16 1*   I STAT HEMOGLOBIN g/dl  --   --   --   --  16 3*  --    HEMATOCRIT %  --  44 3 45 8  --   --  51 6*   HEMATOCRIT, ISTAT %  --   --   --   --  48*  --    PLATELETS Thousands/uL  --  356 351  --   --  392*   POTASSIUM mmol/L 4 8 4 2 4 7 4 3  --  4 5   CHLORIDE mmol/L 112* 116* 114* 116*  --  111*   CO2 mmol/L 23 24 24 24  --  24   CO2, I-STAT mmol/L  --   --   --   --  24  --    BUN mg/dL 119* 125* 164* 178*  --  182*   CREATININE mg/dL 1 90* 2 01* 3 08* 3 93*  --  4 20*   CALCIUM mg/dL 9 1 9 0 9 1 8 6  --  9 4   MAGNESIUM mg/dL  --   --   --   --   --  4 2*   GLUCOSE, ISTAT mg/dl  --   --   --   --  135  --

## 2020-12-27 NOTE — ASSESSMENT & PLAN NOTE
Urinalysis is dirty  Patient also on ceftriaxone  Today urine cultures growing ESBL E coli  We will change to meropenem  Consult ID

## 2020-12-27 NOTE — ASSESSMENT & PLAN NOTE
Patient has hypernatremia, acute kidney injury, leading to metabolic encephalopathy  At baseline patient is alert awake oriented x3  Today patient is mumbling, she is awake but disoriented  CT of the head is negative  On 2 point restraints    Improving

## 2020-12-27 NOTE — ED PROCEDURE NOTE
Procedure  Midline Insertion    Date/Time: 12/27/2020 11:50 AM  Performed by: Amarilis Cao PA-C  Authorized by: Amarilis Cao PA-C     Patient location:  Bedside  Consent:     Consent obtained:  Verbal    Consent given by:  Healthcare agent    Alternatives discussed:  Alternative treatment  Universal protocol:     Patient identity confirmed:  Arm band  Pre-procedure details:     Hand hygiene: Hand hygiene performed prior to insertion      Sterile barrier technique: All elements of maximal sterile technique followed      Skin preparation:  2% chlorhexidine  Indications:     Midline indications: new site    Anesthesia (see MAR for exact dosages): Anesthesia method:  None  Procedure details:     Location:  Right basilic    Laterality:  Right    Catheter size:  18 gauge    Landmarks identified: yes      Ultrasound guidance: yes      Sterile ultrasound techniques: Sterile gel and sterile probe covers were used      Number of attempts:  1    Successful placement: yes      Catheter length (cm):  10    Exposed catheter length:  0    Lot number:  [id number]  Post-procedure details:     Post-procedure:  Dressing applied and securement device placed    Assessment:  Free fluid flow    Post-procedure complications: none      Patient tolerance of procedure: Tolerated well, no immediate complications    Observer:  Yes                       Amarilis Cao PA-C  12/27/20 1350

## 2020-12-27 NOTE — ASSESSMENT & PLAN NOTE
Creatinine on admission on 4 08, likely prerenal because of inadequate p o  Intake as reported by the nursing home  Improved  to 1 2      Plan:  --nephrology following, continue to trend BMP  Discontinue fluids

## 2020-12-28 ENCOUNTER — APPOINTMENT (INPATIENT)
Dept: VASCULAR ULTRASOUND | Facility: HOSPITAL | Age: 80
DRG: 871 | End: 2020-12-28
Payer: MEDICARE

## 2020-12-28 LAB
ALBUMIN SERPL BCP-MCNC: 3.4 G/DL (ref 3.4–4.8)
ALP SERPL-CCNC: 50.8 U/L (ref 35–140)
ALT SERPL W P-5'-P-CCNC: 53 U/L (ref 5–54)
ANION GAP SERPL CALCULATED.3IONS-SCNC: 8 MMOL/L (ref 4–13)
AST SERPL W P-5'-P-CCNC: 63 U/L (ref 15–41)
BILIRUB SERPL-MCNC: 0.51 MG/DL (ref 0.3–1.2)
BUN SERPL-MCNC: 59 MG/DL (ref 6–20)
CALCIUM ALBUM COR SERPL-MCNC: 8.9 MG/DL (ref 8.3–10.1)
CALCIUM SERPL-MCNC: 8.4 MG/DL (ref 8.4–10.2)
CHLORIDE SERPL-SCNC: 107 MMOL/L (ref 96–108)
CK MB SERPL-MCNC: 1.5 % (ref 0–2.5)
CK MB SERPL-MCNC: 4.5 NG/ML (ref 0.1–5.9)
CK SERPL-CCNC: 298.9 U/L (ref 38–234)
CO2 SERPL-SCNC: 25 MMOL/L (ref 22–33)
CREAT SERPL-MCNC: 1.24 MG/DL (ref 0.4–1.1)
ERYTHROCYTE [DISTWIDTH] IN BLOOD BY AUTOMATED COUNT: 14.5 % (ref 11.6–15.1)
GFR SERPL CREATININE-BSD FRML MDRD: 42 ML/MIN/1.73SQ M
GLUCOSE SERPL-MCNC: 165 MG/DL (ref 65–140)
HCT VFR BLD AUTO: 38.1 % (ref 34.8–46.1)
HGB BLD-MCNC: 12 G/DL (ref 11.5–15.4)
MCH RBC QN AUTO: 27.5 PG (ref 26.8–34.3)
MCHC RBC AUTO-ENTMCNC: 31.5 G/DL (ref 31.4–37.4)
MCV RBC AUTO: 87 FL (ref 82–98)
PLATELET # BLD AUTO: 317 THOUSANDS/UL (ref 149–390)
PMV BLD AUTO: 11.1 FL (ref 8.9–12.7)
POTASSIUM SERPL-SCNC: 3.8 MMOL/L (ref 3.5–5)
PROT SERPL-MCNC: 7.3 G/DL (ref 6.4–8.3)
RBC # BLD AUTO: 4.37 MILLION/UL (ref 3.81–5.12)
SODIUM SERPL-SCNC: 140 MMOL/L (ref 133–145)
WBC # BLD AUTO: 7.39 THOUSAND/UL (ref 4.31–10.16)

## 2020-12-28 PROCEDURE — 80053 COMPREHEN METABOLIC PANEL: CPT | Performed by: INTERNAL MEDICINE

## 2020-12-28 PROCEDURE — 93970 EXTREMITY STUDY: CPT | Performed by: SURGERY

## 2020-12-28 PROCEDURE — 82550 ASSAY OF CK (CPK): CPT | Performed by: INTERNAL MEDICINE

## 2020-12-28 PROCEDURE — 93970 EXTREMITY STUDY: CPT

## 2020-12-28 PROCEDURE — 99233 SBSQ HOSP IP/OBS HIGH 50: CPT | Performed by: INTERNAL MEDICINE

## 2020-12-28 PROCEDURE — 97167 OT EVAL HIGH COMPLEX 60 MIN: CPT

## 2020-12-28 PROCEDURE — 97163 PT EVAL HIGH COMPLEX 45 MIN: CPT

## 2020-12-28 PROCEDURE — 94760 N-INVAS EAR/PLS OXIMETRY 1: CPT

## 2020-12-28 PROCEDURE — 82553 CREATINE MB FRACTION: CPT | Performed by: INTERNAL MEDICINE

## 2020-12-28 PROCEDURE — 85027 COMPLETE CBC AUTOMATED: CPT | Performed by: INTERNAL MEDICINE

## 2020-12-28 RX ADMIN — ZINC SULFATE 220 MG (50 MG) CAPSULE 220 MG: CAPSULE at 10:57

## 2020-12-28 RX ADMIN — DEXTROSE 200 ML/HR: 5 SOLUTION INTRAVENOUS at 11:03

## 2020-12-28 RX ADMIN — OXYCODONE HYDROCHLORIDE AND ACETAMINOPHEN 1000 MG: 500 TABLET ORAL at 10:57

## 2020-12-28 RX ADMIN — HEPARIN SODIUM 5000 UNITS: 5000 INJECTION INTRAVENOUS; SUBCUTANEOUS at 15:56

## 2020-12-28 RX ADMIN — FAMOTIDINE 20 MG: 20 TABLET ORAL at 18:07

## 2020-12-28 RX ADMIN — HEPARIN SODIUM 5000 UNITS: 5000 INJECTION INTRAVENOUS; SUBCUTANEOUS at 05:59

## 2020-12-28 RX ADMIN — FAMOTIDINE 20 MG: 20 TABLET ORAL at 10:58

## 2020-12-28 RX ADMIN — DEXAMETHASONE SODIUM PHOSPHATE 6 MG: 4 INJECTION, SOLUTION INTRAMUSCULAR; INTRAVENOUS at 16:25

## 2020-12-28 RX ADMIN — Medication 2000 UNITS: at 10:57

## 2020-12-28 RX ADMIN — HEPARIN SODIUM 5000 UNITS: 5000 INJECTION INTRAVENOUS; SUBCUTANEOUS at 21:54

## 2020-12-28 RX ADMIN — DEXTROSE 200 ML/HR: 5 SOLUTION INTRAVENOUS at 06:10

## 2020-12-28 RX ADMIN — MEROPENEM 1000 MG: 1 INJECTION, POWDER, FOR SOLUTION INTRAVENOUS at 16:25

## 2020-12-28 RX ADMIN — RISPERIDONE 0.25 MG: 0.25 TABLET ORAL at 18:07

## 2020-12-28 NOTE — PHYSICAL THERAPY NOTE
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]  Pneumonia due to COVID-19 virus [U07 1, J12 89]    Past Medical History:   Diagnosis Date    Alzheimer's disease ([facility] )     Hypertension      History reviewed  No pertinent surgical history  Performed at least 2 patient identifiers during session: Name and ID bracelet        12/28/20 1420   PT Last Visit   PT Visit Date 12/28/20   Note Type   Note type Evaluation   Pain Assessment   Pain Assessment Tool Mayers-Baker FACES   Mayers-Baker FACES Pain Rating 0   Effect of Pain on Daily Activities n/a   Hospital Pain Intervention(s) Repositioned; Ambulation/increased activity   Multiple Pain Sites No   Home Living   Type of Home SNF  [facility], [address])   Prior Function   Comments Pt is a poor/unreliable historian, unable to provide PLOF  Per EMR: pt is a long term resident at [place] [address] independently, can self feed  Restrictions/Precautions   Weight Bearing Precautions Per Order No   Other Precautions Airborne/isolation;Contact/isolation; Restraints; Bed Alarm; Chair Alarm;Cognitive;Multiple lines;Telemetry;O2;Fall Risk  (+COVID-19, B soft wrist restraints, tele, O2, lou, IV)   General   Additional Pertinent History Pt admitted 12/24/2020 from [facility] with decreased O2 saturations  Dx: PNA due to COVID-19      Family/Caregiver Present No   Cognition   Overall Cognitive Status Impaired   Arousal/Participation Lethargic   Orientation Level Disoriented X4   Memory Unable to assess   Following Commands Unable to follow one step commands   Comments Pt intermittently falling asleep t/o evaluation  Pt does not follow simple, one step commands  No sustained attention to therapist  Dasie Lies to no verbalizations t/o session  RUE Assessment   RUE Assessment X   RUE Strength   RUE Overall Strength Due to cognitive deficits  (see OT note for details )   LUE Assessment   LUE Assessment X   LUE Strength   LUE Overall Strength Due to cognitive deficits  (see OT note for details )   RLE Assessment   RLE Assessment X   Strength RLE   RLE Overall Strength   (unable to assess d/t cognitive impairments, <3/5 MMT)   LLE Assessment   LLE Assessment X   Strength LLE   LLE Overall Strength   (unable to assess d/t cognitive impairments, <3/5 MMT)   Coordination   Movements are Fluid and Coordinated 0   Sensation   (unable to assess due to pt cognitive impairments )   Bed Mobility   Supine to Sit 1  Dependent   Additional items Assist x 2;HOB elevated; Increased time required;Verbal cues;LE management  (+trunk management )   Sit to Supine 1  Dependent   Additional items Assist x 2; Increased time required;Verbal cues;LE management  (+trunk management )   Additional Comments Pt unable to provide effort into upright sitting balance EOB, requires totalA from therapist to maintain midline sitting position  unable to attention to therapist cues for increased effort/positioning  Transfers   Sit to Stand Unable to assess   Stand to Sit Unable to assess   Stand pivot Unable to assess   Ambulation/Elevation   Gait pattern Not tested; Not appropriate   Balance   Static Sitting Zero   Endurance Deficit   Endurance Deficit Yes   Activity Tolerance   Activity Tolerance Patient limited by fatigue; Other (Comment)  (limited due to impaired cognition)   Medical Staff Made Aware Spoke with SLIM and CM during pt rounds    Nurse Made Aware Spoke with ALFREDA Lauren pre/post session    Assessment   Prognosis Guarded   Problem List Decreased strength;Decreased range of motion;Decreased endurance; Impaired balance;Decreased mobility; Decreased coordination;Decreased cognition; Impaired judgement;Decreased safety awareness   Assessment Pt seen for PT evaluation, cleared by ALFREDA Lauren, activity orders of "ambulate/activity as tolerated"  PT consult received for mobility assessment and discharge needs  Pt admitted 12/24/2020 from SNF with decreased O2 saturations, dx Pneumonia due to COVID-19 virus, rhabdomyolysis  Comorbidities affecting pt's fnxl performance include: LARA, dementia, hypertension and UTI  Personal factors affecting pt at time of PT IE include: communication issues, inability to ambulate household distances, decreased cognition, decreased initiation and engagement, inability to perform physical activity, limited insight into impairments, inability to perform ADLS and inability to perform IADLS   PTA, pt was independent with functional mobility with unknown need for AD, independent w/ basic ADLS, requiring assist for IADLS and a resident of long term care  At time of PT IE, pt requires total A x2 person for all bed mobility, transfers and ambulation not appropriate d/t pt's high level of physical A needed and severely impaired cognition  The AM-PAC objective tool in combination with use of the Barthel Index outcome tool were used to assist in determining pt safety with mobility/ADLs and appropriate dispo recommendations, see below for scores  Pt is at risk of falls d/t multiple comorbidities, impaired balance, impaired cognition, impaired insight/safety awareness, acuity of medical illness, varying levels of pain , advanced age and ongoing medical treatment of primary diagnosis   Pt's clinical presentation is currently unstable/unpredictable seen in pt's presentation of vital sign response, changing level of pain, varying levels of cognitive performance, increased fall risk, new onset of impairment of functional mobility, decreased endurance and new onset of weakness  This PT IE requires high complexity clinical decision making, based on multiple medical/physiological/fnxl/social factors which affect pt's performance w/ evaluation & therapist judgement for future PT sessions  Pt will benefit from continued PT treatment in order to address impairments, decrease risk of falls, maximize independence w/ fnxl mobility, & ensure safety w/ mobility for transition to next level of care  Based on pt presentation & impairments, pt would most appropriately benefit from post acute STR vs long term skilled care facility  upon d/c  Goals   Patient Goals pt unable to state at time of IE    STG Expiration Date 01/07/21   Short Term Goal #1 1  Pt will complete all bed mobility in hospital bed with MODA 1 person, in order to promote increased OOB functional mobility to improve overall activity tolerance  2  Pt will complete all transfers with MODA 1 person, in order to increase safety with functional mobility  3  Pt will ambulate >25ft with LRAD and MARCIA 1 person in order to increase safety with in facility distance functional mobility  4  Pt will tolerate >3hrs OOB in upright position, with no change in behaviors, in order to improve muscular endurance and respiratory status  5  Pt will improve B LE strength to >/= 4/5 MMT throughout, in order to increase safety with functional mobility and decrease risk of falls  6  Pt will improve AM-PAC score to >/= 11/24 in order to increase independence with mobility and decrease burden of care  7  Pt will improve Barthel Index score to >/= 10/100 in order to increase independence and decrease risk of falls  8  Pt will improve static sitting balance by >/= 1/2 grade in order to promote safety and increased independence with mobility  PT Treatment Day 0   Plan   Treatment/Interventions Functional transfer training;LE strengthening/ROM; Therapeutic exercise; Endurance training;Cognitive reorientation;Patient/family training;Equipment eval/education; Bed mobility;Gait training;Continued evaluation;Spoke to MD;Spoke to nursing;Spoke to case management;OT   PT Frequency 2-3x/wk  (on trial basis )   Recommendation   PT Discharge Recommendation Post-Acute Rehabilitation Services  (vs return to LTC skilled care )   Equipment Recommended Other (Comment)  (TDB pending progress )   AM-PAC Basic Mobility Inpatient   Turning in Bed Without Bedrails 1   Lying on Back to Sitting on Edge of Flat Bed 1   Moving Bed to Chair 1   Standing Up From Chair 1   Walk in Room 1   Climb 3-5 Stairs 1   Basic Mobility Inpatient Raw Score 6   Turning Head Towards Sound 1   Follow Simple Instructions 1   Low Function Basic Mobility Raw Score 8   Low Function Basic Mobility Standardized Score 10 37   Modified Eloy Scale   Modified Martha Scale 5   Barthel Index   Feeding 0   Bathing 0   Grooming Score 0   Dressing Score 0   Bladder Score 0   Bowels Score 0   Toilet Use Score 0   Transfers (Bed/Chair) Score 0   Mobility (Level Surface) Score 0   Stairs Score 0   Barthel Index Score 0         Brenda Brito, PT, DPT   12/28/2020

## 2020-12-28 NOTE — OCCUPATIONAL THERAPY NOTE
OT EVALUATION       12/28/20 1421   Note Type   Note type Evaluation   Restrictions/Precautions   Other Precautions Airborne/isolation; Chair Alarm; Bed Alarm;Cognitive; Fall Risk  (COVID+)   Pain Assessment   Pain Assessment Tool Mayers-Baker FACES   Mayers-Baker FACES Pain Rating 0   Home Living   Type of Home SNF  ([facility])   Prior [address] Help From Personal care attendant   ADL Assistance Needs assistance   IADLs Needs assistance   Comments Pt is a poor/unreliable historian, unable to provide PLOF  Per EMR: pt is a long term [facility], can self feed  ADL   Eating Assistance 1  Total Assistance   Grooming Assistance 1  Total Assistance   UB Bathing Assistance 1  Total Assistance   LB Bathing Assistance 1  Total Assistance   UB Dressing Assistance 1  Total Assistance   LB Dressing Assistance 1  Total [address]  1  Total Assistance   Bed Mobility   Supine to Sit 1  Dependent   Sit to Supine 1  Dependent   Additional Comments pt unable to participate in sitting on the edge of bed  Patient required total assist to stay upright on edge of bed  Transfers   Sit to Stand Unable to assess   Stand to Sit Unable to assess   Balance   Static Sitting Zero   Activity Tolerance   Activity Tolerance Patient limited by fatigue  (impaired cognition)   Nurse Made Aware yes   RUE Assessment   RUE Assessment   (PROM WFL, can squeeze therapists hand slightly)   LUE Assessment   LUE Assessment   (PROM WFL, can squeeze therapists hand slightly)   Cognition   Overall Cognitive Status Impaired   Arousal/Participation Arousable   Attention Difficulty dividing attention   Orientation Level Disoriented X4   Following Commands Unable to follow one step commands   Comments pt with little verbalization throughout session    Assessment   Limitation Decreased ADL status; Decreased UE ROM; Decreased UE strength;Decreased Safe judgement during ADL;Decreased cognition;Decreased endurance;Decreased self-care trans;Decreased high-level ADLs  (decreased balance and mobility )   Prognosis Fair   Assessment Patient evaluated by Occupational Therapy  Patient admitted with Pneumonia due to COVID-19 virus  The patients occupational profile, medical and therapy history includes a extensive additional review of physical, cognitive, or psychosocial history related to current functional performance  Comorbidities affecting functional mobility and ADLS include: dementia and hypertension  Prior to admission, patient was ambulates independently and can feed self  The evaluation identifies the following performance deficits: weakness, impaired balance, decreased endurance, increased fall risk, new onset of impairment of functional mobility, decreased ADLS, decreased IADLS, decreased activity tolerance, decreased safety awareness, impaired judgement, decreased cognition and decreased strength, that result in activity limitations and/or participation restrictions  This evaluation requires clinical decision making of high complexity, because the patient presents with comorbidites that affect occupational performance and required significant modification of tasks or assistance with consideration of multiple treatment options  The Barthel Index was used as a functional outcome tool presenting with a score of 0, indicating marked limitations of functional mobility and ADLS  Patient will benefit from a trial of skilled Occupational Therapy services to address above deficits and facilitate a safe return to prior level of function     Goals   Patient Goals unable to state due to cognitive impairment    STG Time Frame   (1-7 days)   Short Term Goal  Patient will increase standing tolerance to 1 minutes during ADL task to decrease assistance level and decrease fall risk; Patient will increase bed mobility to max assist of 2 in preparation for ADLS and transfers; Patient will tolerate 10 minutes of UE ROM/strengthening to increase general activity tolerance and performance in ADLS/IADLS; Patient will improve functional activity tolerance to 10 minutes of sustained functional tasks to increase participation in basic self-care and decrease assistance level;   Patient will increase dynamic sitting balance to poor to improve the ability to sit at edge of bed or on a chair for ADLS;  Patient will increase dynamic standing balance to poor to improve postural stability and decrease fall risk during standing ADLS and transfers  LTG Time Frame   (8-14 days)   Long Term Goal Patient will increase standing tolerance to 2 minutes during ADL task to decrease assistance level and decrease fall risk; Patient will increase bed mobility to mod assist of 2 in preparation for ADLS and transfers; Patient will tolerate 20 minutes of UE ROM/strengthening to increase general activity tolerance and performance in ADLS/IADLS; Patient will improve functional activity tolerance to 20 minutes of sustained functional tasks to increase participation in basic self-care and decrease assistance level;   Patient will increase dynamic sitting balance to poor+ to improve the ability to sit at edge of bed or on a chair for ADLS;  Patient will increase dynamic standing balance to poor+ to improve postural stability and decrease fall risk during standing ADLS and transfers  Functional Transfer Goals   Pt Will Perform All Functional Transfers   (STG max assist of 2 LTG mod assist of 2)   ADL Goals   Pt Will Perform All ADL's   (STG max assist LTG Mod assist )   Plan   Treatment Interventions ADL retraining;Functional transfer training;UE strengthening/ROM; Endurance training;Cognitive reorientation;Patient/family training;Equipment evaluation/education; Activityengagement; Compensatory technique education   Goal Expiration Date 01/11/21   OT Frequency 1-2x/wk   Recommendation   OT Discharge Recommendation Post-Acute Rehabilitation Services   Barthel Index   Feeding 0   Bathing 0   Grooming Score 0   Dressing Score 0   Bladder Score 0   Bowels Score 0   Toilet Use Score 0   Transfers (Bed/Chair) Score 0   Mobility (Level Surface) Score 0   Stairs Score 0   Barthel Index Score 0   Vin Hernandez MS OTR/L

## 2020-12-28 NOTE — PLAN OF CARE
Problem: Potential for Falls  Goal: Patient will remain free of falls  Description: INTERVENTIONS:  - Assess patient frequently for physical needs  -  Identify cognitive and physical deficits and behaviors that affect risk of falls  -  [place] fall precautions as indicated by assessment   - Educate patient/family on patient safety including physical limitations  - Instruct patient to call for assistance with activity based on assessment  - Modify environment to reduce risk of injury  - Consider OT/PT consult to assist with strengthening/mobility  Outcome: Not Progressing     Problem: Prexisting or High Potential for Compromised Skin Integrity  Goal: Skin integrity is maintained or improved  Description: INTERVENTIONS:  - Identify patients at risk for skin breakdown  - Assess and monitor skin integrity  - Assess and monitor nutrition and hydration status  - Monitor labs   - Assess for incontinence   - Turn and reposition patient  - Assist with mobility/ambulation  - Relieve pressure over bony prominences  - Avoid friction and shearing  - Provide appropriate hygiene as needed including keeping skin clean and dry  - Evaluate need for skin moisturizer/barrier cream  - Collaborate with interdisciplinary team   - Patient/family teaching  - Consider wound care consult   Outcome: Not Progressing     Problem: Nutrition/Hydration-ADULT  Goal: Nutrient/Hydration intake appropriate for improving, restoring or maintaining nutritional needs  Description: Monitor and assess patient's nutrition/hydration status for malnutrition  Collaborate with interdisciplinary team and initiate plan and interventions as ordered  Monitor patient's weight and dietary intake as ordered or per policy  Utilize nutrition screening tool and intervene as necessary  Determine patient's food preferences and provide high-protein, high-caloric foods as appropriate       INTERVENTIONS:  - Monitor oral intake, urinary output, labs, and treatment plans  - Assess nutrition and hydration status and recommend course of action  - Evaluate amount of meals eaten  - Assist patient with eating if necessary   - Allow adequate time for meals  - Recommend/ encourage appropriate diets, oral nutritional supplements, and vitamin/mineral supplements  - Order, calculate, and assess calorie counts as needed  - Recommend, monitor, and adjust tube feedings and TPN/PPN based on assessed needs  - Assess need for intravenous fluids  - Provide specific nutrition/hydration education as appropriate  - Include patient/family/caregiver in decisions related to nutrition  Outcome: Not Progressing

## 2020-12-28 NOTE — PROGRESS NOTES
Patient examined spoke with the nurse patient is severely demented resting in the bed no meaningful communication patient does not know where she is she is not aware of her problems limitations and needs patient needs help with ADL care  Patient has periods of agitation and being combative very difficult to redirect her she may need restrained time to time nurse reports that she took her medicine and she seems little better medical treatment is in progress  Patient is suffering from dementia with behavior problem she is a nursing home resident  She is not able to care for herself patient needed to order the psychotropic medication to control her behavior  Currently patient is on Risperdal Remeron and Haldol p r n   No side effects of medications noted  Patient is not lethargic  Discussed with the nurse and the resident physician  Patient will need psychiatric follow-up after the discharge  Medical treatment is ongoing discussed with the medical attending no new behavior problem reported and noted  Patient is manageable with the behavior with current medications  I will continue the same medicine at this time  I will follow up

## 2020-12-28 NOTE — PROGRESS NOTES
NEPHROLOGY PROGRESS NOTE   Lynda Peterson 68 y o  female MRN: [id number]  Unit/Bed#: -01 Encounter: [id number]    ASSESSMENT & PLAN:  42-year-old female with a history of dementia, hypertension with recently diagnosed COVID to admitted for hypoxia and shortness of breath   Nephrology consult for hyponatremia and acute kidney injury  Acute kidney injury, POA  -baseline creatinine is not known  -admission creatinine was 4 2 mg/dl  -UA showed 10-20 RBC per high-power field with 2+ blood and 1+ protein and numerous WBCs  -acute kidney injury suspected to be from volume depletion/prerenal azotemia/possibility of ATN in the setting of COVID-19 sepsis in the setting of autoregulatory failure from being on ARB  Also has elevated CK level to 1600 which could be contributing   -renal function already improving with creatinine down to 1 24 on 12/28  Sodium level trended down to 140 mEq/liter  -stopped D5 water on 12/28 was receiving at 200 mL per hour  Need to stress on oral hydration which is still poor, high risk of recurrence of hypernatremia if oral intake does not improve  -avoid nephrotoxins and hypotension  Continue holding statins  Continue holding losartan    Hypernatremia  -sodium level trended down to 140 mEq/liter from previous level of 150 mEq/liter  Overall 10 mEq drop in sodium level over last 24 hours which is acceptable  Agree with stopping D5 water, continue to monitor sodium level  Recommend stressing on oral hydration  High risk of recurrence of hypernatremia if oral intake remains poor    Mild rhabdomyolysis  -admission CK level was 1186 2, peak CK level 1649 4  Trended down to 851 on 12/27 with IV hydration and holding statins  Continue to hold statins    Primary hypertension:  Blood pressure is currently acceptable  Avoid hypotension    Continue to hold losartan for now in the setting of acute kidney injury    Acute respiratory failure with hypoxia likely due to COVID-19 infection:  Continue management per primary team   She was diagnosed with COVID-19 on December 14th when she was at [address]  Continue oxygen per primary team     UTI:  Urine culture positive for ESBL E coli, continue antibiotics per primary team   Noted plan for ID consult    Altered mental status/metabolic encephalopathy top of baseline dementia with Alzheimer's disease  -as per primary team note patient is oriented x3 at baseline but currently she is confused  CT head was negative  Altered mental status likely multifactorial:  UTI, hypernatremia:  Continue to monitor mental status  Discussed with primary team resident        SUBJECTIVE:  No new complaints  Patient is nonverbal   Has poor oral appetite    OBJECTIVE:  Current Weight: Weight - Scale: 75 9 kg (167 lb 5 3 oz)  Vitals:    12/28/20 0900   BP:    Pulse:    Resp:    Temp:    SpO2: 95%   /73 (BP Location: Right arm)   Pulse 70   Temp (!) 95 6 °F (35 3 °C) (Tympanic)   Resp 16   Ht 5' 6" (1 676 m)   Wt 75 9 kg (167 lb 5 3 oz)   SpO2 96%   BMI 27 01 kg/m²       Intake/Output Summary (Last 24 hours) at 12/28/2020 1021  Last data filed at 12/28/2020 9082  Gross per 24 hour   Intake 0 ml   Output 1550 ml   Net -1550 ml       Physical Exam   Patient seen and examined using full PPE  General:  Ill looking, awake  Eyes: Conjunctivae pink,  Sclera anicteric  ENT: lips and mucous membranes moist  Neck: supple   Chest: Clear to Auscultation both lungs,  no crackles, ronchus or wheezing  CVS: S1 & S2 present, normal rate, regular rhythm, no murmur    Abdomen: soft, non-tender, non-distended, Bowel sounds normoactive  Extremities: no edema of  legs  Skin: no rash  Neuro:  Awake alert but nonverbal   Appears confused  Psych:  Not able to assess as patient is nonverbal      Medications:    Current Facility-Administered Medications:     acetaminophen (TYLENOL) tablet 650 mg, 650 mg, Oral, Q6H PRN, Alex Huff MD    aluminum-magnesium hydroxide-simethicone (MYLANTA) oral suspension 30 mL, 30 mL, Oral, Q6H PRN, Vinod Adan MD    ascorbic acid (VITAMIN C) tablet 1,000 mg, 1,000 mg, Oral, Q12H [address], Vinod Adan MD, 1,000 mg at 12/27/20 1240    bisacodyl (DULCOLAX) rectal suppository 10 mg, 10 mg, Rectal, Daily PRN, Vinod Adan MD    cefTRIAXone (ROCEPHIN) IVPB (premix in dextrose) 1,000 mg 50 mL, 1,000 mg, Intravenous, Q24H, Vinod Adan MD, Last Rate: 100 mL/hr at 12/27/20 1520, 1,000 mg at 12/27/20 1520    cholecalciferol (VITAMIN D3) tablet 2,000 Units, 2,000 Units, Oral, Daily, Vinod Adan MD, 2,000 Units at 12/27/20 1241    dexamethasone (DECADRON) injection 6 mg, 6 mg, Intravenous, Q24H, Vinod Adan MD, 6 mg at 12/27/20 1520    dextrose 5 % infusion, 200 mL/hr, Intravenous, Continuous, Marika Sanchez MD, Last Rate: 200 mL/hr at 12/28/20 0610, 200 mL/hr at 12/28/20 0610    famotidine (PEPCID) tablet 20 mg, 20 mg, Oral, BID, Vinod Adan MD, 20 mg at 12/27/20 1727    haloperidol lactate (HALDOL) injection 2 mg, 2 mg, Intramuscular, Q6H PRN, Pau Morris MD, Stopped at 12/27/20 1241    heparin (porcine) subcutaneous injection 5,000 Units, 5,000 Units, Subcutaneous, Q8H [address], Vinod Adan MD, 5,000 Units at 12/28/20 0559    mirtazapine (REMERON) tablet 7 5 mg, 7 5 mg, Oral, HS, Pau Morris MD    zinc sulfate (ZINCATE) capsule 220 mg, 220 mg, Oral, Daily, 220 mg at 12/27/20 1241 **FOLLOWED BY** [START ON 12/31/2020] multivitamin-minerals (CENTRUM ADULTS) tablet 1 tablet, 1 tablet, Oral, Daily, Vinod Adan MD    ondansetron [facility]) injection 4 mg, 4 mg, Intravenous, Q6H PRN, Vinod Adan MD    risperiDONE (RisperDAL) tablet 0 25 mg, 0 25 mg, Oral, BID, Pau Morris MD, 0 25 mg at 12/27/20 1727    Invasive Devices:   Urethral Catheter Temperature probe (Active)   Reasons to continue Urinary Catheter  Accurate I&O assessment in critically ill patients (48 hr  max) 12/24/20 1430   Site Assessment Skin intact; Clean 12/28/20 0201   Collection Container Standard drainage bag 12/28/20 0201   Securement Method Securing device (Describe) 12/28/20 0201   Output (mL) 600 mL 12/28/20 0635       Lab Results:   Results from last 7 days   Lab Units 12/28/20  0609 12/27/20  0426 12/26/20  1211 12/25/20  0654  12/24/20  1307 12/24/20  1247   WBC Thousand/uL 7 39  --  9 13 6 31  --   --  8 99   HEMOGLOBIN g/dL 12 0  --  14 0 14 1  --   --  16 1*   I STAT HEMOGLOBIN g/dl  --   --   --   --   --  16 3*  --    HEMATOCRIT % 38 1  --  44 3 45 8  --   --  51 6*   HEMATOCRIT, ISTAT %  --   --   --   --   --  48*  --    PLATELETS Thousands/uL 317  --  356 351  --   --  392*   POTASSIUM mmol/L  --  4 8 4 2 4 7   < >  --  4 5   CHLORIDE mmol/L  --  112* 116* 114*   < >  --  111*   CO2 mmol/L  --  23 24 24   < >  --  24   CO2, I-STAT mmol/L  --   --   --   --   --  24  --    BUN mg/dL  --  119* 125* 164*   < >  --  182*   CREATININE mg/dL  --  1 90* 2 01* 3 08*   < >  --  4 20*   CALCIUM mg/dL  --  9 1 9 0 9 1   < >  --  9 4   MAGNESIUM mg/dL  --   --   --   --   --   --  4 2*   ALK PHOS U/L  --   --  47 8 52 2  --   --  62 5   ALT U/L  --   --  26 28  --   --  36   AST U/L  --   --  33 37  --   --  49*   GLUCOSE, ISTAT mg/dl  --   --   --   --   --  135  --     < > = values in this interval not displayed  Previous work up:      Portions of the record may have been created with voice recognition software  Occasional wrong word or "sound a like" substitutions may have occurred due to the inherent limitations of voice recognition software  Read the chart carefully and recognize, using context, where substitutions have occurred  If you have any questions, please contact the dictating provider

## 2020-12-28 NOTE — PROGRESS NOTES
Progress Note - Donald Carie 1940, 68 y o  female MRN: [id number]    Unit/Bed#: -01 Encounter: [id number]    Primary Care Provider: No primary care provider on file  Date and time admitted to hospital: 12/24/2020 12:01 PM        Acute respiratory failure with hypoxia [facility])  Assessment & Plan  COVID 19 on Dec 14, 2020  Patient got diagnosed with COVID-19 on December 14, 2020  After that she was started on multivitamins and Decadron in [place]  Patient got worse over time  For past few days she is not eating or drinking anything  Her oxygen saturations dropped  That's why  patient was sent to the hospital   Patient does not use oxygen at baseline  Now she is requiring 6 L of oxygen to maintain saturation  Patient is in acute hypoxic respiratory failure secondary to COVID-19 pneumonia  O2 sats currently improving  Down from 6 L to 4 L via nasal cannula  No home o2 use  Other vitals stable  See the remaining plan under COVID pneumonia    * Pneumonia due to COVID-19 virus  Assessment & Plan  Patient was found COVID positive on December 14, 2020  She was started with Decadron and multivitamins at nursing home  Her conditions got worse  Today she was found to have low oxygen saturation  Nursing home staff told that patient is not eating or drinking anything for couple of days  Chest x-ray is evident for pneumonia  Patient is requiring 6 L of oxygen  Will start on moderate COVID-19 positive  Continue on antibiotics, anticoagulation, steroids, multivitamins  Patient has severe LARA  Hold remdisivir  Self Proning  We talked to son in detail, he want patient to be full code  Continue to monitor closely      UTI (urinary tract infection)  Assessment & Plan  Urinalysis is dirty  Patient also on ceftriaxone  Today urine cultures growing ESBL E coli  We will change to meropenem  Consult ID      LARA (acute kidney injury) [facility])  Assessment & Plan  Creatinine on admission on 4 08, likely prerenal because of inadequate p o  Intake as reported by the nursing home  Improved  to 1 2      Plan:  --nephrology following, continue to trend BMP  Discontinue fluids  Hypernatremia  Assessment & Plan  Sodium is 152 on admission  Nephro following  Today sodium is 140, discontinue IV fluids  Trend BMP tomorrow  Rhabdomyolysis  Assessment & Plan  CK level is trending down  Discontinue IV fluids today  We will repeat CK level  Encourage free water intake  COVID-19  Assessment & Plan  See above    Elevated d-dimer  Assessment & Plan  Patient has elevated D-dimer  She is on heparin 5000 q 8 hours  Patient cannot get therapeutic Lovenox because she has severe Jordan I  We will get a lower extremity duplex study to rule out DVT  Metabolic encephalopathy  Assessment & Plan  Patient has hypernatremia, acute kidney injury, leading to metabolic encephalopathy  At baseline patient is alert awake oriented x3  Today patient is mumbling, she is awake but disoriented  CT of the head is negative  On 2 point restraints  Improving      Alzheimer's disease [facility])  Assessment & Plan  Patient has baseline dementia  But alert awake oriented x3 at baseline  She walks normal, she can feed herself  Continue dementia care  Currently does not follow commands, difficult stick for blood work  We will put the midline  Hypertension  Assessment & Plan  Patient has history of hypertension, she takes losartan  Now on hold 2/2 jordan        VTE Pharmacologic Prophylaxis:   Pharmacologic: Heparin  Mechanical VTE Prophylaxis in Place: Yes          Current Length of Stay: 4 day(s)    Current Patient Status: Inpatient   Code Status: Level 1 - Full Code      Subjective: Today I saw and examined the patient at bedside  Patient improved, she was talking, not making much sense but better than yesterday  Vital signs are stable  Patient is improving    Objective:     Vitals:   Temp (24hrs), Av 1 °F (35 6 °C), Min:95 5 °F (35 3 °C), Max:96 7 °F (35 9 °C)    Temp:  [95 5 °F (35 3 °C)-96 7 °F (35 9 °C)] 95 6 °F (35 3 °C)  HR:  [53-73] 70  Resp:  [14-20] 16  BP: (104-152)/(70-86) 144/73  SpO2:  [91 %-97 %] 96 %  Body mass index is 27 01 kg/m²  Input and Output Summary (last 24 hours): Intake/Output Summary (Last 24 hours) at 12/28/2020 1309  Last data filed at 12/28/2020 0989  Gross per 24 hour   Intake 0 ml   Output 1550 ml   Net -1550 ml       Physical Exam:     Physical ExamVitals signs and nursing note reviewed  Constitutional:       General:  Patient is on restrains 2 point     Appearance: She is well-developed  She is ill-appearing  Breathing on 6 L of oxygen via nasal cannula  HENT:      Head: Normocephalic and atraumatic  Mouth/Throat:      Mouth: Mucous membranes are dry  Eyes:      Conjunctiva/sclera: Conjunctivae normal    Neck:      Musculoskeletal: Neck supple  Cardiovascular:      Rate and Rhythm: Normal rate and regular rhythm  Heart sounds: No murmur  Pulmonary:      Effort: Respiratory distress present  Breath sounds: Normal breath sounds  Decreased air movement present  Abdominal:      Palpations: Abdomen is soft  Tenderness: There is no abdominal tenderness  Musculoskeletal:      Comments: Tender all over   Skin:     General: Skin is warm and dry  Capillary Refill: Capillary refill takes less than 2 seconds  Neurological:      General: No focal deficit present  Mental Status: She is alert  She is disoriented  Comments: Patient is moving extremities spontaneously  CT head negative    She is on restraints        Additional Data:     Labs:    Results from last 7 days   Lab Units 12/28/20  0609 12/26/20  1211   WBC Thousand/uL 7 39 9 13   HEMOGLOBIN g/dL 12 0 14 0   HEMATOCRIT % 38 1 44 3   PLATELETS Thousands/uL 317 356   LYMPHO PCT %  --  4*   MONO PCT %  --  3*   EOS PCT %  --  0     Results from last 7 days   Lab Units 12/28/20  1103 12/24/20  1307   POTASSIUM mmol/L 3 8   < >  --    CHLORIDE mmol/L 107   < >  --    CO2 mmol/L 25   < >  --    CO2, I-STAT mmol/L  --   --  24   BUN mg/dL 59*   < >  --    CREATININE mg/dL 1 24*   < >  --    CALCIUM mg/dL 8 4   < >  --    ALK PHOS U/L 50 8   < >  --    ALT U/L 53   < >  --    AST U/L 63*   < >  --    GLUCOSE, ISTAT mg/dl  --   --  135    < > = values in this interval not displayed  * I Have Reviewed All Lab Data Listed Above  * Additional Pertinent Lab Tests Reviewed: Yaquelin 68 Admission Reviewed    Imaging:    Recent Cultures (last 7 days):     Results from last 7 days   Lab Units 12/26/20  1212 12/24/20  1247   BLOOD CULTURE   --  No Growth at 72 hrs  No Growth at 72 hrs     URINE CULTURE  70,000-79,000 cfu/ml Escherichia coli ESBL*  7537-9849 cfu/ml   --        Last 24 Hours Medication List:   Current Facility-Administered Medications   Medication Dose Route Frequency Provider Last Rate    acetaminophen  650 mg Oral Q6H PRN Kirt Rodrgiues MD      aluminum-magnesium hydroxide-simethicone  30 mL Oral Q6H PRN Kirt Rodrigues MD      ascorbic acid  1,000 mg Oral Q12H [address] Kirt Rodrigues MD      bisacodyl  10 mg Rectal Daily PRN Kirt Rodrigues MD      cholecalciferol  2,000 Units Oral Daily Kirt Rodrigues MD      dexamethasone  6 mg Intravenous Q24H Kirt Rodrigues MD      famotidine  20 mg Oral BID Kirt Rodrigues MD      haloperidol lactate  2 mg Intramuscular Q6H PRN Jose Jacobo MD      heparin (porcine)  5,000 Units Subcutaneous [facility] Kirt Rodrigues MD      meropenem  1,000 mg Intravenous Q12H Kirt Rodrigues MD      mirtazapine  7 5 mg Oral HS Jose Jacobo MD      zinc sulfate  220 mg Oral Daily Kirt Rodrigues MD      Followed by   Gloria Snowden ON 12/31/2020] multivitamin-minerals  1 tablet Oral Daily Kirt Rodrigues MD      ondansetron  4 mg Intravenous Q6H PRN Kirt Rodrigues MD      risperiDONE  0 25 mg Oral BID Jose Jacobo MD          Today, Patient Was Seen By: Orestes Brito MD    ** Please Note: This note has been constructed using a voice recognition system   **

## 2020-12-29 PROBLEM — E87.0 HYPERNATREMIA: Status: RESOLVED | Noted: 2020-12-24 | Resolved: 2020-12-29

## 2020-12-29 PROBLEM — M62.82 RHABDOMYOLYSIS: Status: RESOLVED | Noted: 2020-12-24 | Resolved: 2020-12-29

## 2020-12-29 PROBLEM — N17.9 AKI (ACUTE KIDNEY INJURY) (HCC): Status: RESOLVED | Noted: 2020-12-24 | Resolved: 2020-12-29

## 2020-12-29 LAB
ALBUMIN SERPL BCP-MCNC: 3.5 G/DL (ref 3.4–4.8)
ALP SERPL-CCNC: 55.8 U/L (ref 35–140)
ALT SERPL W P-5'-P-CCNC: 106 U/L (ref 5–54)
ANION GAP SERPL CALCULATED.3IONS-SCNC: 8 MMOL/L (ref 4–13)
AST SERPL W P-5'-P-CCNC: 81 U/L (ref 15–41)
BACTERIA BLD CULT: NORMAL
BACTERIA BLD CULT: NORMAL
BACTERIA UR CULT: ABNORMAL
BACTERIA UR CULT: ABNORMAL
BASOPHILS # BLD AUTO: 0.01 THOUSANDS/ΜL (ref 0–0.1)
BASOPHILS NFR BLD AUTO: 0 % (ref 0–1)
BILIRUB SERPL-MCNC: 0.51 MG/DL (ref 0.3–1.2)
BUN SERPL-MCNC: 45 MG/DL (ref 6–20)
CALCIUM SERPL-MCNC: 8.9 MG/DL (ref 8.4–10.2)
CHLORIDE SERPL-SCNC: 109 MMOL/L (ref 96–108)
CO2 SERPL-SCNC: 24 MMOL/L (ref 22–33)
CREAT SERPL-MCNC: 1.07 MG/DL (ref 0.4–1.1)
EOSINOPHIL # BLD AUTO: 0 THOUSAND/ΜL (ref 0–0.61)
EOSINOPHIL NFR BLD AUTO: 0 % (ref 0–6)
ERYTHROCYTE [DISTWIDTH] IN BLOOD BY AUTOMATED COUNT: 13.6 % (ref 11.6–15.1)
GFR SERPL CREATININE-BSD FRML MDRD: 50 ML/MIN/1.73SQ M
GLUCOSE SERPL-MCNC: 131 MG/DL (ref 65–140)
HCT VFR BLD AUTO: 42.8 % (ref 34.8–46.1)
HGB BLD-MCNC: 13.7 G/DL (ref 11.5–15.4)
IMM GRANULOCYTES # BLD AUTO: 0.09 THOUSAND/UL (ref 0–0.2)
IMM GRANULOCYTES NFR BLD AUTO: 1 % (ref 0–2)
LYMPHOCYTES # BLD AUTO: 0.78 THOUSANDS/ΜL (ref 0.6–4.47)
LYMPHOCYTES NFR BLD AUTO: 9 % (ref 14–44)
MCH RBC QN AUTO: 27.7 PG (ref 26.8–34.3)
MCHC RBC AUTO-ENTMCNC: 32 G/DL (ref 31.4–37.4)
MCV RBC AUTO: 87 FL (ref 82–98)
MONOCYTES # BLD AUTO: 0.49 THOUSAND/ΜL (ref 0.17–1.22)
MONOCYTES NFR BLD AUTO: 6 % (ref 4–12)
NEUTROPHILS # BLD AUTO: 7.48 THOUSANDS/ΜL (ref 1.85–7.62)
NEUTS SEG NFR BLD AUTO: 84 % (ref 43–75)
PLATELET # BLD AUTO: 359 THOUSANDS/UL (ref 149–390)
PMV BLD AUTO: 10.5 FL (ref 8.9–12.7)
POTASSIUM SERPL-SCNC: 4.8 MMOL/L (ref 3.5–5)
PROT SERPL-MCNC: 7.5 G/DL (ref 6.4–8.3)
RBC # BLD AUTO: 4.94 MILLION/UL (ref 3.81–5.12)
SODIUM SERPL-SCNC: 141 MMOL/L (ref 133–145)
WBC # BLD AUTO: 8.85 THOUSAND/UL (ref 4.31–10.16)

## 2020-12-29 PROCEDURE — 99232 SBSQ HOSP IP/OBS MODERATE 35: CPT | Performed by: INTERNAL MEDICINE

## 2020-12-29 PROCEDURE — 85025 COMPLETE CBC W/AUTO DIFF WBC: CPT | Performed by: INTERNAL MEDICINE

## 2020-12-29 PROCEDURE — 94760 N-INVAS EAR/PLS OXIMETRY 1: CPT

## 2020-12-29 PROCEDURE — 80053 COMPREHEN METABOLIC PANEL: CPT | Performed by: INTERNAL MEDICINE

## 2020-12-29 PROCEDURE — 92526 ORAL FUNCTION THERAPY: CPT

## 2020-12-29 RX ORDER — MIRTAZAPINE 15 MG/1
15 TABLET, FILM COATED ORAL
Status: DISCONTINUED | OUTPATIENT
Start: 2020-12-29 | End: 2021-01-05 | Stop reason: HOSPADM

## 2020-12-29 RX ADMIN — DEXAMETHASONE SODIUM PHOSPHATE 6 MG: 4 INJECTION, SOLUTION INTRAMUSCULAR; INTRAVENOUS at 16:42

## 2020-12-29 RX ADMIN — APIXABAN 2.5 MG: 2.5 TABLET, FILM COATED ORAL at 16:42

## 2020-12-29 RX ADMIN — MEROPENEM 1000 MG: 1 INJECTION, POWDER, FOR SOLUTION INTRAVENOUS at 04:31

## 2020-12-29 RX ADMIN — ZINC SULFATE 220 MG (50 MG) CAPSULE 220 MG: CAPSULE at 08:39

## 2020-12-29 RX ADMIN — MIRTAZAPINE 15 MG: 15 TABLET, FILM COATED ORAL at 22:48

## 2020-12-29 RX ADMIN — HEPARIN SODIUM 5000 UNITS: 5000 INJECTION INTRAVENOUS; SUBCUTANEOUS at 05:31

## 2020-12-29 RX ADMIN — OXYCODONE HYDROCHLORIDE AND ACETAMINOPHEN 1000 MG: 500 TABLET ORAL at 22:48

## 2020-12-29 RX ADMIN — FAMOTIDINE 20 MG: 20 TABLET ORAL at 16:42

## 2020-12-29 RX ADMIN — OXYCODONE HYDROCHLORIDE AND ACETAMINOPHEN 1000 MG: 500 TABLET ORAL at 08:38

## 2020-12-29 RX ADMIN — FAMOTIDINE 20 MG: 20 TABLET ORAL at 08:38

## 2020-12-29 RX ADMIN — RISPERIDONE 0.25 MG: 0.25 TABLET ORAL at 08:38

## 2020-12-29 RX ADMIN — Medication 2000 UNITS: at 08:38

## 2020-12-29 NOTE — PROGRESS NOTES
Progress Note - Loraine Dies 1940, 68 y o  female MRN: [id number]    Unit/Bed#: -01 Encounter: [id number]    Primary Care Provider: No primary care provider on file  Date and time admitted to hospital: 12/24/2020 12:01 PM        Acute respiratory failure with hypoxia [facility])  Assessment & Plan  COVID 19 on Dec 14, 2020  Patient got diagnosed with COVID-19 on December 14, 2020  After that she was started on multivitamins and Decadron in [facility]  Patient got worse over time  For past few days she is not eating or drinking anything  Her oxygen saturations dropped  That's why  patient was sent to the hospital   Patient does not use oxygen at baseline  Now she is requiring 6 L of oxygen to maintain saturation  Patient is in acute hypoxic respiratory failure secondary to COVID-19 pneumonia  O2 sats currently improving  Down from 6 L to 3 L via nasal cannula  No home o2 use  Other vitals stable  See the remaining plan under COVID pneumonia  * Pneumonia due to COVID-19 virus  Assessment & Plan  Patient was found COVID positive on December 14, 2020  She was started with Decadron and multivitamins at nursing home  Her conditions got worse  Today she was found to have low oxygen saturation  Nursing home staff told that patient is not eating or drinking anything for couple of days  Chest x-ray is evident for pneumonia  Patient is requiring 6 L of oxygen  Will start on moderate COVID-19 positive  Patient completed  Antibiotics  Continue anticoagulation, steroids, multivitamins  Patient has severe LARA  Hold remdisivir  Plasma given   Self Proning  We talked to son in detail, son will be coming today to see patient to make decision for goals of care  Continue to monitor closely      UTI (urinary tract infection)  Assessment & Plan  Urinalysis is dirty  Patient was initially on ceftriaxone , then culture grew ESBL, was switched to meropenem   Today I talked to Dr PATTI Juan Pablo Urena , he suggested to d/c antibiotics as patient is afebrile, no leucocytosis  Received 6 days of antibiotics  Likely colonization  D/c lou and do voiding trial      JORDAN (acute kidney injury) (HCC)resolved as of 12/29/2020  Assessment & Plan  Creatinine on admission on 4 08, likely prerenal because of inadequate p o  Intake as reported by the nursing home  Improved  to 1  Plan:  --nephrology following, continue to trend BMP      Hypernatremiaresolved as of 12/29/2020  Assessment & Plan  Sodium is 152 on admission  Nephro following  Today sodium is 141, encourage free water intake Trend BMP tomorrow  Rhabdomyolysisresolved as of 12/29/2020  Assessment & Plan  CK level is trending down  CK Level trended down   Encourage free water intake  COVID-19  Assessment & Plan  See above    Elevated d-dimer  Assessment & Plan  Patient has elevated D-dimer  She is on heparin 5000 q 8 hours  Patient cannot get therapeutic Lovenox because she has severe Jordan I  Venous duplex negative for DVT , transition to eliquis 2 5 BID per covid protocol    Metabolic encephalopathy  Assessment & Plan  Patient has hypernatremia, acute kidney injury, leading to metabolic encephalopathy  At baseline patient is alert awake oriented x3  Today patient is mumbling, she is awake but disoriented  CT of the head is negative  On 2 point restraints  Improving  Will try to get her off from restrains       Alzheimer's disease [facility])  Assessment & Plan  Patient has baseline dementia  But alert awake oriented x3 at baseline  She walks normal, she can feed herself  Continue dementia care  Currently does not follow commands, patient is refusing to eat  Hypertension  Assessment & Plan  Patient has history of hypertension, she takes losartan      Now on hold 2/2 jordan      VTE Pharmacologic Prophylaxis:   Pharmacologic: Apixaban (Eliquis)  Mechanical VTE Prophylaxis in Place: Yes    Discussions with Specialists or Other Care Team Provider:     Current Length of Stay: 5 day(s)    Current Patient Status: Inpatient       Code Status: Level 1 - Full Code      Subjective: Today I saw and examined patient at bedside  She is on restrains, awake and alert, not oriented  Objective:     Vitals:   Temp (24hrs), Av 7 °F (36 5 °C), Min:97 5 °F (36 4 °C), Max:97 8 °F (36 6 °C)    Temp:  [97 5 °F (36 4 °C)-97 8 °F (36 6 °C)] 97 5 °F (36 4 °C)  HR:  [53-76] 76  Resp:  [16-18] 18  BP: (120-151)/(67-77) 139/68  SpO2:  [94 %-98 %] 96 %  Body mass index is 26 55 kg/m²  Input and Output Summary (last 24 hours): Intake/Output Summary (Last 24 hours) at 2020 1306  Last data filed at 2020 1301  Gross per 24 hour   Intake 120 ml   Output 2650 ml   Net -2530 ml       Physical Exam:     Physical Exam  ed  Constitutional:       General:  Patient is on restrains 2 point     Appearance: She is well-developed  She is ill-appearing  Breathing on 6 L of oxygen via nasal cannula  HENT:      Head: Normocephalic and atraumatic  Mouth/Throat:      Mouth: Mucous membranes are dry  Eyes:      Conjunctiva/sclera: Conjunctivae normal    Neck:      Musculoskeletal: Neck supple  Cardiovascular:      Rate and Rhythm: Normal rate and regular rhythm  Heart sounds: No murmur  Pulmonary:      Effort: Respiratory distress present  Breath sounds: Normal breath sounds  Decreased air movement present  Abdominal:      Palpations: Abdomen is soft  Tenderness: There is no abdominal tenderness  Musculoskeletal:      Comments: Tender all over   Skin:     General: Skin is warm and dry  Capillary Refill: Capillary refill takes less than 2 seconds  Neurological:      General: No focal deficit present  Mental Status: She is alert  She is disoriented  Comments: Patient is moving extremities spontaneously  CT head negative    She is on restraints    Additional Data:     Labs:    Results from last 7 days   Lab Units 12/29/20  0454   WBC Thousand/uL 8 85   HEMOGLOBIN g/dL 13 7   HEMATOCRIT % 42 8   PLATELETS Thousands/uL 359   NEUTROS PCT % 84*   LYMPHS PCT % 9*   MONOS PCT % 6   EOS PCT % 0     Results from last 7 days   Lab Units 12/29/20  0454  12/24/20  1307   POTASSIUM mmol/L 4 8   < >  --    CHLORIDE mmol/L 109*   < >  --    CO2 mmol/L 24   < >  --    CO2, I-STAT mmol/L  --   --  24   BUN mg/dL 45*   < >  --    CREATININE mg/dL 1 07   < >  --    CALCIUM mg/dL 8 9   < >  --    ALK PHOS U/L 55 8   < >  --    ALT U/L 106*   < >  --    AST U/L 81*   < >  --    GLUCOSE, ISTAT mg/dl  --   --  135    < > = values in this interval not displayed  * I Have Reviewed All Lab Data Listed Above  * Additional Pertinent Lab Tests Reviewed: Y[facility]elin 68 Admission Reviewed        Recent Cultures (last 7 days):     Results from last 7 days   Lab Units 12/26/20  1212 12/24/20  1247   BLOOD CULTURE   --  No Growth After 4 Days  No Growth After 4 Days     URINE CULTURE  70,000-79,000 cfu/ml Escherichia coli ESBL*  9571-8523 cfu/ml   --        Last 24 Hours Medication List:   Current Facility-Administered Medications   Medication Dose Route Frequency Provider Last Rate    acetaminophen  650 mg Oral Q6H PRN Norberto Seth MD      aluminum-magnesium hydroxide-simethicone  30 mL Oral Q6H PRN Norberto Seth MD      apixaban  2 5 mg Oral BID Norberto Seth MD      ascorbic acid  1,000 mg Oral Q12H [address] Norberto Seth MD      bisacodyl  10 mg Rectal Daily PRN Norberto Seth MD      cholecalciferol  2,000 Units Oral Daily Norberto Seth MD      dexamethasone  6 mg Intravenous Q24H Norberto Seth MD      famotidine  20 mg Oral BID Norberto Seth MD      haloperidol lactate  2 mg Intramuscular Q6H PRN Héctor Bullard MD      mirtazapine  15 mg Oral HS Héctor Bullard MD      zinc sulfate  220 mg Oral Daily Norebrto Seth MD      Followed by   Jake Condon ON 12/31/2020] multivitamin-minerals  1 tablet Oral Daily Norberto Seth MD      ondansetron  4 mg Intravenous Q6H PRN Isis Jimenez MD      risperiDONE  0 25 mg Oral BID Iveth Garcia MD          Today, Patient Was Seen By: Isis Jimenez MD    ** Please Note: This note has been constructed using a voice recognition system   **

## 2020-12-29 NOTE — ASSESSMENT & PLAN NOTE
Patient was found COVID positive on December 14, 2020  She was started with Decadron and multivitamins at nursing home  Her conditions got worse  Today she was found to have low oxygen saturation  Nursing home staff told that patient is not eating or drinking anything for couple of days  Chest x-ray is evident for pneumonia  Patient is requiring 6 L of oxygen  Will start on moderate COVID-19 positive  Patient completed  Antibiotics  Continue anticoagulation, steroids, multivitamins  Patient has severe LARA  Hold remdisivir  Plasma given   Self Proning  We talked to son in detail, son will be coming today to see patient to make decision for goals of care    Continue to monitor closely

## 2020-12-29 NOTE — PROGRESS NOTES
Patient examined spoke with the nurse discussed with the resident physician patient is demented confused guarded not lethargic no meaningful communications she is not aware of her problems limitations and needs  Nurse reports that patient becomes combative at times very restless and difficult to redirect her  She takes medications  No side effects of medications Risperdal Remeron and Haldol p r n  Noted  Patient needs help with ADL care  Patient id he could not tell me her age does not know where she is she is not aware of her problems limitations and needs  Patient needs supervised living she is from the nursing home  Once medically stable she will be discharged to the nursing home psychiatric follow-up recommended after the discharge  I will increase her Remeron from 7 5 mg HS to 15 mg HS to help with the depression sleep and improve her appetite  I will follow up

## 2020-12-29 NOTE — SPEECH THERAPY NOTE
Speech Language/Pathology    Speech/Language Pathology Progress Note    Patient Name: Maribel Bowie Date: 12/29/2020     Problem List  Principal Problem:    Pneumonia due to COVID-19 virus  Active Problems:    Acute respiratory failure with hypoxia (HCC)    Hypertension    Alzheimer's disease ([address] )    Metabolic encephalopathy    UTI (urinary tract infection)    Elevated d-dimer    COVID-19      Subjective:  Pt  Was awake in bed  No verbal responses noted  Objective:  Pt  Was seen for dysphagia treatment as follow up to assess current diet tolerance  She was seen for lunch meal  Reportedly patient's overall intake is low  RN reported that he was fed 25% of her breakfast meal  She clenched her mouth and pushed her head back when presented with her main foods  She was willing to take in her magic cup however with each spoonful, did a head pull back like she was surprised when the spoon touched her lips  She was given her cup to hold in her left hand and she did need [place] assistance to bring it to her mouth  Able to take 4x single sips with no s/s of aspiration  Assessment:  Pt  Has no s/s of aspiration on HTL via cup and puree  Limited intake overall  Does not initiate eating or drinking or holding cups or utensils  Has a startle response every time something touches her lips  No verbalizations noted during this session  Plan/Recommendations:  Continue with Dys 1 puree and honey thick liquids  Medications crushed in puree  Encourage patient to hold own cups and utensils, however most likely will have to remain a full feed  Speech with d/c, as patient is from a SNF and mostly likely will be returning there as was on a modified diet prior to this hospitalization    Reconsult speech if warranted    Pete Valente MS CCC-SLP  Speech Language Pathologist  Available via [facility] License # [id number]  [facility] [facility] [address] # [id number]

## 2020-12-29 NOTE — ASSESSMENT & PLAN NOTE
Patient has hypernatremia, acute kidney injury, leading to metabolic encephalopathy  At baseline patient is alert awake oriented x3  Today patient is mumbling, she is awake but disoriented  CT of the head is negative  On 2 point restraints  Improving   Will try to get her off from restrains

## 2020-12-29 NOTE — ASSESSMENT & PLAN NOTE
Creatinine on admission on 4 08, likely prerenal because of inadequate p o  Intake as reported by the nursing home  Improved  to 1         Plan:  --nephrology following, continue to trend BMP

## 2020-12-29 NOTE — ASSESSMENT & PLAN NOTE
Patient has elevated D-dimer  She is on heparin 5000 q 8 hours    Patient cannot get therapeutic Lovenox because she has severe Jordan I  Venous duplex negative for DVT , transition to eliquis 2 5 BID per covid protocol

## 2020-12-29 NOTE — PLAN OF CARE
Problem: Potential for Falls  Goal: Patient will remain free of falls  Description: INTERVENTIONS:  - Assess patient frequently for physical needs  -  Identify cognitive and physical deficits and behaviors that affect risk of falls  -  [place] fall precautions as indicated by assessment   - Educate patient/family on patient safety including physical limitations  - Instruct patient to call for assistance with activity based on assessment  - Modify environment to reduce risk of injury  - Consider OT/PT consult to assist with strengthening/mobility  Outcome: Progressing     Problem: Prexisting or High Potential for Compromised Skin Integrity  Goal: Skin integrity is maintained or improved  Description: INTERVENTIONS:  - Identify patients at risk for skin breakdown  - Assess and monitor skin integrity  - Assess and monitor nutrition and hydration status  - Monitor labs   - Assess for incontinence   - Turn and reposition patient  - Assist with mobility/ambulation  - Relieve pressure over bony prominences  - Avoid friction and shearing  - Provide appropriate hygiene as needed including keeping skin clean and dry  - Evaluate need for skin moisturizer/barrier cream  - Collaborate with interdisciplinary team   - Patient/family teaching  - Consider wound care consult   Outcome: Progressing     Problem: Nutrition/Hydration-ADULT  Goal: Nutrient/Hydration intake appropriate for improving, restoring or maintaining nutritional needs  Description: Monitor and assess patient's nutrition/hydration status for malnutrition  Collaborate with interdisciplinary team and initiate plan and interventions as ordered  Monitor patient's weight and dietary intake as ordered or per policy  Utilize nutrition screening tool and intervene as necessary  Determine patient's food preferences and provide high-protein, high-caloric foods as appropriate       INTERVENTIONS:  - Monitor oral intake, urinary output, labs, and treatment plans  - Assess nutrition and hydration status and recommend course of action  - Evaluate amount of meals eaten  - Assist patient with eating if necessary   - Allow adequate time for meals  - Recommend/ encourage appropriate diets, oral nutritional supplements, and vitamin/mineral supplements  - Order, calculate, and assess calorie counts as needed  - Recommend, monitor, and adjust tube feedings and TPN/PPN based on assessed needs  - Assess need for intravenous fluids  - Provide specific nutrition/hydration education as appropriate  - Include patient/family/caregiver in decisions related to nutrition  Outcome: Progressing

## 2020-12-29 NOTE — PROGRESS NOTES
NEPHROLOGY PROGRESS NOTE   Alessandro Mack 68 y o  female MRN: [id number]  Unit/Bed#: -01 Encounter: [id number]    ASSESSMENT & PLAN:  61-year-old female with a history of dementia, hypertension with recently diagnosed COVID to admitted for hypoxia and shortness of breath   Nephrology consult for hyponatremia and acute kidney injury  Acute kidney injury, POA  -baseline creatinine is not known  -admission creatinine was 4 2 mg/dl  -UA showed 10-20 RBC per high-power field with 2+ blood and 1+ protein and numerous WBCs  -acute kidney injury suspected to be from volume depletion/prerenal azotemia/possibility of ATN in the setting of COVID-19 sepsis in the setting of autoregulatory failure from being on ARB  Also has elevated CK level to 1600 which could be contributing   -renal function already improving with creatinine down to 1 07    -stopped D5 water on 12/28 was receiving at 200 mL per hour    -avoid nephrotoxins and hypotension     -Continue holding statins  Continue holding losartan  -Monitor renal function  Hypernatremia  - resolved with D5 water  Sodium level was 154 mEq/liter on 12/24/20   -sodium level today 141  She has been D5 water since 12/28  Recommend stressing hydration prevent recurrence of hypernatremia  Mild rhabdomyolysis  -admission CK level was 1186 2, peak CK level 1649 4  Trended down to 851 on 12/27 and further trended down to 298 on 12/28 with IV hydration and holding statins  Continue to hold statins  Primary hypertension:  Blood pressure is currently acceptable  Avoid hypotension  Continue to hold losartan for now in the setting of acute kidney injury  Continue to monitor blood pressure    Acute respiratory failure with hypoxia likely due to COVID-19 infection:  Continue management per primary team   She was diagnosed with COVID-19 on December 14th when she was at [address]    Continue oxygen per primary team and wean as tolerated    UTI:  Urine culture positive for ESBL E coli, was taken off antibiotics as per ID recommendations by primary team   Suspected to be colonization  Altered mental status/metabolic encephalopathy top of baseline dementia with Alzheimer's disease  -as per primary team note patient is oriented x3 at baseline but currently she is confused and disoriented  CT head was negative  Altered mental status likely multifactorial:  UTI, hypernatremia:  Continue to monitor mental status  Discussed with primary team resident Dr Jose Wilhelm  Nothing further to add from a renal standpoint  Will sign off  Feel free to call us back for any questions or concerns  Inform office to arrange outpatient follow up and check BMP before office visit  SUBJECTIVE:  No new complaints  Patient is still confused and has poor oral intake    OBJECTIVE:  Current Weight: Weight - Scale: 74 6 kg (164 lb 7 4 oz)  Vitals:    12/29/20 1452   BP: 126/74   Pulse: 84   Resp: 20   Temp: (!) 96 3 °F (35 7 °C)   SpO2: 95%   /74 (BP Location: Right arm)   Pulse 84   Temp (!) 96 3 °F (35 7 °C) (Tympanic)   Resp 20   Ht 5' 6" (1 676 m)   Wt 74 6 kg (164 lb 7 4 oz)   SpO2 95%   BMI 26 55 kg/m²       Intake/Output Summary (Last 24 hours) at 12/29/2020 1606  Last data filed at 12/29/2020 1301  Gross per 24 hour   Intake 120 ml   Output 2650 ml   Net -2530 ml       Physical Exam   Patient seen and examined from the door to preserve PPE and decrease exposure risk to COVID 19  Discussed with primary team resident Dr Jose Wilhelm regarding exam findings   Following findings based on my observation and her findings  General:  Ill looking, awake  Eyes: Conjunctivae pink,  Sclera anicteric  ENT: lips and mucous membranes dry  Neck: supple   Chest: equal air entry both sides  Decreased air entry both lungs  CVS: S1 & S2 present, normal rate, regular rhythm, no murmur    Abdomen: soft, non-tender, non-distended, Bowel sounds normoactive  Extremities: no edema of  legs  Skin: no rash  Neuro: awake but disoriented   Psych: Disoriented     Medications:    Current Facility-Administered Medications:     acetaminophen (TYLENOL) tablet 650 mg, 650 mg, Oral, Q6H PRN, Ifrah Tobar MD    aluminum-magnesium hydroxide-simethicone (MYLANTA) oral suspension 30 mL, 30 mL, Oral, Q6H PRN, Ifrah Tobar MD    apixaban (ELIQUIS) tablet 2 5 mg, 2 5 mg, Oral, BID, Ifrah Tobar MD    ascorbic acid (VITAMIN C) tablet 1,000 mg, 1,000 mg, Oral, Q12H [address], Ifrah Tobar MD, 1,000 mg at 12/29/20 0065    bisacodyl (DULCOLAX) rectal suppository 10 mg, 10 mg, Rectal, Daily PRN, Ifrah Tobar MD    cholecalciferol (VITAMIN D3) tablet 2,000 Units, 2,000 Units, Oral, Daily, Ifrah Tobar MD, 2,000 Units at 12/29/20 4550    dexamethasone (DECADRON) injection 6 mg, 6 mg, Intravenous, Q24H, Ifrah Tobar MD, 6 mg at 12/28/20 1625    famotidine (PEPCID) tablet 20 mg, 20 mg, Oral, BID, Ifrah Tobar MD, 20 mg at 12/29/20 3599    mirtazapine (REMERON) tablet 15 mg, 15 mg, Oral, HS, Niya Al MD    zinc sulfate (ZINCATE) capsule 220 mg, 220 mg, Oral, Daily, 220 mg at 12/29/20 0839 **FOLLOWED BY** [START ON 12/31/2020] multivitamin-minerals (CENTRUM ADULTS) tablet 1 tablet, 1 tablet, Oral, Daily, Ifrah Tobar MD    [facility]ron [facility]) injection 4 mg, 4 mg, Intravenous, Q6H PRN, Ifrah Tobar MD    Invasive Devices:   Urethral Catheter Temperature probe (Active)   Reasons to continue Urinary Catheter  Accurate I&O assessment in critically ill patients (48 hr  max) 12/24/20 1430   Site Assessment Skin intact; Clean 12/28/20 0201   Collection Container Standard drainage bag 12/28/20 0201   Securement Method Securing device (Describe) 12/28/20 0201   Output (mL) 600 mL 12/28/20 0635       Lab Results:   Results from last 7 days   Lab Units 12/29/20  0454 12/28/20  1103 12/28/20  0609 12/27/20  0426 12/26/20  1211  12/24/20  1307 12/24/20  1247   WBC Thousand/uL 8 85  --  7 39  --  9 13   < >  --  8 99   HEMOGLOBIN g/dL 13  7  --  12 0  --  14 0   < >  --  16 1*   I STAT HEMOGLOBIN g/dl  --   --   --   --   --   --  16 3*  --    HEMATOCRIT % 42 8  --  38 1  --  44 3   < >  --  51 6*   HEMATOCRIT, ISTAT %  --   --   --   --   --   --  48*  --    PLATELETS Thousands/uL 359  --  317  --  356   < >  --  392*   POTASSIUM mmol/L 4 8 3 8  --  4 8 4 2   < >  --  4 5   CHLORIDE mmol/L 109* 107  --  112* 116*   < >  --  111*   CO2 mmol/L 24 25  --  23 24   < >  --  24   CO2, I-STAT mmol/L  --   --   --   --   --   --  24  --    BUN mg/dL 45* 59*  --  119* 125*   < >  --  182*   CREATININE mg/dL 1 07 1 24*  --  1 90* 2 01*   < >  --  4 20*   CALCIUM mg/dL 8 9 8 4  --  9 1 9 0   < >  --  9 4   MAGNESIUM mg/dL  --   --   --   --   --   --   --  4 2*   ALK PHOS U/L 55 8 50 8  --   --  47 8   < >  --  62 5   ALT U/L 106* 53  --   --  26   < >  --  36   AST U/L 81* 63*  --   --  33   < >  --  49*   GLUCOSE, ISTAT mg/dl  --   --   --   --   --   --  135  --     < > = values in this interval not displayed  Previous work up:      Portions of the record may have been created with voice recognition software  Occasional wrong word or "sound a like" substitutions may have occurred due to the inherent limitations of voice recognition software  Read the chart carefully and recognize, using context, where substitutions have occurred  If you have any questions, please contact the dictating provider

## 2020-12-29 NOTE — ASSESSMENT & PLAN NOTE
Urinalysis is dirty  Patient was initially on ceftriaxone , then culture grew ESBL, was switched to meropenem  Today I talked to ID , Dr Audie Leach , he suggested to d/c antibiotics as patient is afebrile, no leucocytosis  Received 6 days of antibiotics  Likely colonization   D/c lou and do voiding trial

## 2020-12-29 NOTE — CASE MANAGEMENT
LOS 5, patient is not a bundle, patient is not a 30 day readmission  CM called The [address] [place] [address] Drive where patient is a resident, spoke to [facility] Ty  Per Ty and per response in [employer], patient is a bed hold and may return when medically stable  Per unit supervisor Ty, patient was ambulating independently, feeding herself and completing ADLs with cuing, patient was alert and confused  Telephone numbers for report at The [address] [place] [address] are: [phone] ext  [phone]    Per chart review, patient is currently requiring 3L O2 acutely, down from 6L  Per SLIM note today, son will be coming in to see the patient and make decisions on goals of care  CM will continue to follow and assist through discharge

## 2020-12-29 NOTE — ASSESSMENT & PLAN NOTE
Patient has baseline dementia  But alert awake oriented x3 at baseline  She walks normal, she can feed herself  Continue dementia care  Currently does not follow commands, patient is refusing to eat

## 2020-12-29 NOTE — PLAN OF CARE
Problem: Potential for Falls  Goal: Patient will remain free of falls  Description: INTERVENTIONS:  - Assess patient frequently for physical needs  -  Identify cognitive and physical deficits and behaviors that affect risk of falls  -  [place] fall precautions as indicated by assessment   - Educate patient/family on patient safety including physical limitations  - Instruct patient to call for assistance with activity based on assessment  - Modify environment to reduce risk of injury  - Consider OT/PT consult to assist with strengthening/mobility  Outcome: Not Progressing     Problem: Prexisting or High Potential for Compromised Skin Integrity  Goal: Skin integrity is maintained or improved  Description: INTERVENTIONS:  - Identify patients at risk for skin breakdown  - Assess and monitor skin integrity  - Assess and monitor nutrition and hydration status  - Monitor labs   - Assess for incontinence   - Turn and reposition patient  - Assist with mobility/ambulation  - Relieve pressure over bony prominences  - Avoid friction and shearing  - Provide appropriate hygiene as needed including keeping skin clean and dry  - Evaluate need for skin moisturizer/barrier cream  - Collaborate with interdisciplinary team   - Patient/family teaching  - Consider wound care consult   Outcome: Not Progressing     Problem: Nutrition/Hydration-ADULT  Goal: Nutrient/Hydration intake appropriate for improving, restoring or maintaining nutritional needs  Description: Monitor and assess patient's nutrition/hydration status for malnutrition  Collaborate with interdisciplinary team and initiate plan and interventions as ordered  Monitor patient's weight and dietary intake as ordered or per policy  Utilize nutrition screening tool and intervene as necessary  Determine patient's food preferences and provide high-protein, high-caloric foods as appropriate       INTERVENTIONS:  - Monitor oral intake, urinary output, labs, and treatment plans  - Assess nutrition and hydration status and recommend course of action  - Evaluate amount of meals eaten  - Assist patient with eating if necessary   - Allow adequate time for meals  - Recommend/ encourage appropriate diets, oral nutritional supplements, and vitamin/mineral supplements  - Order, calculate, and assess calorie counts as needed  - Recommend, monitor, and adjust tube feedings and TPN/PPN based on assessed needs  - Assess need for intravenous fluids  - Provide specific nutrition/hydration education as appropriate  - Include patient/family/caregiver in decisions related to nutrition  Outcome: Not Progressing

## 2020-12-29 NOTE — ASSESSMENT & PLAN NOTE
COVID 19 on Dec 14, 2020  Patient got diagnosed with COVID-19 on December 14, 2020  After that she was started on multivitamins and Decadron in [facility]  Patient got worse over time  For past few days she is not eating or drinking anything  Her oxygen saturations dropped  That's why  patient was sent to the hospital   Patient does not use oxygen at baseline  Now she is requiring 6 L of oxygen to maintain saturation  Patient is in acute hypoxic respiratory failure secondary to COVID-19 pneumonia  O2 sats currently improving  Down from 6 L to 3 L via nasal cannula  No home o2 use  Other vitals stable  See the remaining plan under COVID pneumonia

## 2020-12-29 NOTE — ASSESSMENT & PLAN NOTE
Sodium is 152 on admission  Nephro following  Today sodium is 141, encourage free water intake Trend BMP tomorrow

## 2020-12-30 PROBLEM — J96.01 ACUTE RESPIRATORY FAILURE WITH HYPOXIA (HCC): Status: RESOLVED | Noted: 2020-12-24 | Resolved: 2020-12-30

## 2020-12-30 PROBLEM — J96.90 RESPIRATORY FAILURE (HCC): Status: ACTIVE | Noted: 2020-12-24

## 2020-12-30 PROBLEM — J96.90 RESPIRATORY FAILURE (HCC): Status: RESOLVED | Noted: 2020-12-24 | Resolved: 2020-12-30

## 2020-12-30 LAB
ALBUMIN SERPL BCP-MCNC: 3.9 G/DL (ref 3.4–4.8)
ALP SERPL-CCNC: 64.2 U/L (ref 35–140)
ALT SERPL W P-5'-P-CCNC: 143 U/L (ref 5–54)
ANION GAP SERPL CALCULATED.3IONS-SCNC: 8 MMOL/L (ref 4–13)
AST SERPL W P-5'-P-CCNC: 85 U/L (ref 15–41)
BASOPHILS # BLD AUTO: 0.01 THOUSANDS/ΜL (ref 0–0.1)
BASOPHILS NFR BLD AUTO: 0 % (ref 0–1)
BILIRUB SERPL-MCNC: 0.45 MG/DL (ref 0.3–1.2)
BUN SERPL-MCNC: 44 MG/DL (ref 6–20)
CALCIUM SERPL-MCNC: 9.8 MG/DL (ref 8.4–10.2)
CHLORIDE SERPL-SCNC: 112 MMOL/L (ref 96–108)
CO2 SERPL-SCNC: 27 MMOL/L (ref 22–33)
CREAT SERPL-MCNC: 1.07 MG/DL (ref 0.4–1.1)
EOSINOPHIL # BLD AUTO: 0.03 THOUSAND/ΜL (ref 0–0.61)
EOSINOPHIL NFR BLD AUTO: 0 % (ref 0–6)
ERYTHROCYTE [DISTWIDTH] IN BLOOD BY AUTOMATED COUNT: 13.6 % (ref 11.6–15.1)
GFR SERPL CREATININE-BSD FRML MDRD: 50 ML/MIN/1.73SQ M
GLUCOSE SERPL-MCNC: 115 MG/DL (ref 65–140)
HCT VFR BLD AUTO: 47.1 % (ref 34.8–46.1)
HGB BLD-MCNC: 14.7 G/DL (ref 11.5–15.4)
IMM GRANULOCYTES # BLD AUTO: 0.07 THOUSAND/UL (ref 0–0.2)
IMM GRANULOCYTES NFR BLD AUTO: 1 % (ref 0–2)
LYMPHOCYTES # BLD AUTO: 0.82 THOUSANDS/ΜL (ref 0.6–4.47)
LYMPHOCYTES NFR BLD AUTO: 9 % (ref 14–44)
MCH RBC QN AUTO: 27 PG (ref 26.8–34.3)
MCHC RBC AUTO-ENTMCNC: 31.2 G/DL (ref 31.4–37.4)
MCV RBC AUTO: 87 FL (ref 82–98)
MONOCYTES # BLD AUTO: 0.44 THOUSAND/ΜL (ref 0.17–1.22)
MONOCYTES NFR BLD AUTO: 5 % (ref 4–12)
NEUTROPHILS # BLD AUTO: 7.81 THOUSANDS/ΜL (ref 1.85–7.62)
NEUTS SEG NFR BLD AUTO: 85 % (ref 43–75)
PLATELET # BLD AUTO: 377 THOUSANDS/UL (ref 149–390)
PMV BLD AUTO: 10.6 FL (ref 8.9–12.7)
POTASSIUM SERPL-SCNC: 4.9 MMOL/L (ref 3.5–5)
PROT SERPL-MCNC: 8.1 G/DL (ref 6.4–8.3)
RBC # BLD AUTO: 5.44 MILLION/UL (ref 3.81–5.12)
SODIUM SERPL-SCNC: 147 MMOL/L (ref 133–145)
WBC # BLD AUTO: 9.18 THOUSAND/UL (ref 4.31–10.16)

## 2020-12-30 PROCEDURE — 85025 COMPLETE CBC W/AUTO DIFF WBC: CPT | Performed by: INTERNAL MEDICINE

## 2020-12-30 PROCEDURE — 80053 COMPREHEN METABOLIC PANEL: CPT | Performed by: INTERNAL MEDICINE

## 2020-12-30 PROCEDURE — 99232 SBSQ HOSP IP/OBS MODERATE 35: CPT | Performed by: INTERNAL MEDICINE

## 2020-12-30 RX ORDER — DEXTROSE MONOHYDRATE 50 MG/ML
100 INJECTION, SOLUTION INTRAVENOUS CONTINUOUS
Status: DISCONTINUED | OUTPATIENT
Start: 2020-12-30 | End: 2021-01-01

## 2020-12-30 RX ADMIN — DEXAMETHASONE SODIUM PHOSPHATE 6 MG: 4 INJECTION, SOLUTION INTRAMUSCULAR; INTRAVENOUS at 18:43

## 2020-12-30 RX ADMIN — OXYCODONE HYDROCHLORIDE AND ACETAMINOPHEN 1000 MG: 500 TABLET ORAL at 09:59

## 2020-12-30 RX ADMIN — APIXABAN 2.5 MG: 2.5 TABLET, FILM COATED ORAL at 18:43

## 2020-12-30 RX ADMIN — FAMOTIDINE 20 MG: 20 TABLET ORAL at 09:59

## 2020-12-30 RX ADMIN — ZINC SULFATE 220 MG (50 MG) CAPSULE 220 MG: CAPSULE at 09:59

## 2020-12-30 RX ADMIN — Medication 2000 UNITS: at 09:59

## 2020-12-30 RX ADMIN — APIXABAN 2.5 MG: 2.5 TABLET, FILM COATED ORAL at 09:59

## 2020-12-30 RX ADMIN — MIRTAZAPINE 15 MG: 15 TABLET, FILM COATED ORAL at 22:38

## 2020-12-30 RX ADMIN — OXYCODONE HYDROCHLORIDE AND ACETAMINOPHEN 1000 MG: 500 TABLET ORAL at 22:38

## 2020-12-30 RX ADMIN — FAMOTIDINE 20 MG: 20 TABLET ORAL at 18:43

## 2020-12-30 RX ADMIN — DEXTROSE 100 ML/HR: 5 SOLUTION INTRAVENOUS at 09:59

## 2020-12-30 NOTE — PROGRESS NOTES
Patient examined spoke with the nurse resident physician's notes reviewed and noted  Patient remained demented confused disoriented flat affect guarded periods of agitation and being combative during the daily care  Nurse reports the same  Discussed with the resident physician  Patient needs help with ADL care  Patient is a nursing home resident  Patient is not aware of her problems limitations and needs she cannot take care of herself  Patient will benefit from psychiatric follow-up at the nursing home  I reviewed her medications  No side effects of medications  Continue current medications patient is on Remeron and Ativan p r n   Once patient is medically stable she will be discharged back to the nursing home  I will follow up

## 2020-12-30 NOTE — ASSESSMENT & PLAN NOTE
Patient has hypernatremia, acute kidney injury, leading to metabolic encephalopathy  At baseline patient is alert awake oriented x3  Today patient is mumbling, she is awake but disoriented  CT of the head is negative  Improving

## 2020-12-30 NOTE — CASE MANAGEMENT
CM was notified that son requested a callback to discuss a different SNF  Patient currently resides at The [address] [place] [address] and is a bed hold  CM called son Keysha White at [phone] to discuss discharge planning  Son states that he does not feel confident that patient is being well taken care of at The [address] [place] [address]  Freedom of choice provided  Son requests referrals to [address] [place] [address] based upon recommendations that he has received from acquaintances  Referrals placed in [employer]scripts per son's request   Awaiting responses

## 2020-12-30 NOTE — ASSESSMENT & PLAN NOTE
Patient was found COVID positive on December 14, 2020  She was started with Decadron and multivitamins at nursing home  Her conditions got worse  Today she was found to have low oxygen saturation  Nursing home staff told that patient is not eating or drinking anything for couple of days  Chest x-ray is evident for pneumonia  Patient is requiring 6 L of oxygen  Will start on moderate COVID-19 positive  Patient completed  Antibiotics  Continue anticoagulation, steroids, multivitamins  Patient has severe LARA  Hold remdisivir  Plasma given   Self Proning  Patient is on room air, chest clear to auscultation    Continue to monitor closely

## 2020-12-30 NOTE — ASSESSMENT & PLAN NOTE
Sodium is 152 on admission  Nephro on board  Today sodium is 147, will start again on D5 water  Patient does not eat, we encourage free water intake    Trend BMP tomorrow

## 2020-12-30 NOTE — PROGRESS NOTES
Progress Note - Sharif Mirlande 1940, 68 y o  female MRN: [id number]    Unit/Bed#: -01 Encounter: [id number]    Primary Care Provider: No primary care provider on file  Date and time admitted to hospital: 12/24/2020 12:01 PM    Hypernatremia  Assessment & Plan  Sodium is 152 on admission  Nephro on board  Today sodium is 147, will start again on D5 water  Patient does not eat, we encourage free water intake  Trend BMP tomorrow    Alzheimer's disease [facility])  Assessment & Plan  Patient has baseline dementia  But alert awake oriented x3 at baseline  She walks normal, she can feed herself  Continue dementia care  Currently does not follow commands, patient is refusing to eat  * Pneumonia due to COVID-19 virus  Assessment & Plan  Patient was found COVID positive on December 14, 2020  She was started with Decadron and multivitamins at nursing home  Her conditions got worse  Today she was found to have low oxygen saturation  Nursing home staff told that patient is not eating or drinking anything for couple of days  Chest x-ray is evident for pneumonia  Patient is requiring 6 L of oxygen  Will start on moderate COVID-19 positive  Patient completed  Antibiotics  Continue anticoagulation, steroids, multivitamins  Patient has severe LARA  Hold remdisivir  Plasma given   Self Proning  Patient is on room air, chest clear to auscultation  Continue to monitor closely       UTI (urinary tract infection)  Assessment & Plan  Urinalysis is dirty  Patient was initially on ceftriaxone , then culture grew ESBL, was switched to meropenem  Today I talked to Dr Shikha ARMSTRONG , he suggested to d/c antibiotics as patient is afebrile, no leucocytosis  Received 6 days of antibiotics  Likely colonization  Acute hypoxic respiratory failureresolved as of 12/30/2020  Assessment & Plan  COVID 19 on Dec 14, 2020  Patient got diagnosed with COVID-19 on December 14, 2020   After that she was started on multivitamins and Decadron in [facility]  Patient got worse over time  For past few days she is not eating or drinking anything  Her oxygen saturations dropped  That's why  patient was sent to the hospital   Patient does not use oxygen at baseline  Now she is requiring 6 L of oxygen to maintain saturation  Patient is in acute hypoxic respiratory failure secondary to COVID-19 pneumonia  O2 sats currently improving  Down from 6 L to 3 L via nasal cannula  No home o2 use  Other vitals stable  See the remaining plan under COVID pneumonia  COVID-19  Assessment & Plan  See above    Elevated d-dimer  Assessment & Plan  Patient has elevated D-dimer  She is on heparin 5000 q 8 hours  Patient cannot get therapeutic Lovenox because she has severe Jordan I  Venous duplex negative for DVT , transition to eliquis 2 5 BID per covid protocol    Metabolic encephalopathy  Assessment & Plan  Patient has hypernatremia, acute kidney injury, leading to metabolic encephalopathy  At baseline patient is alert awake oriented x3  Today patient is mumbling, she is awake but disoriented  CT of the head is negative  Improving  Hypertension  Assessment & Plan  Patient has history of hypertension, she takes losartan  Now on hold 2/2 jordan      VTE Pharmacologic Prophylaxis:   Pharmacologic: Apixaban (Eliquis)  Mechanical VTE Prophylaxis in Place: Yes       Current Length of Stay: 6 day(s)    Current Patient Status: Inpatient     Code Status: Level 1 - Full Code      Subjective: Today I saw and examined the patient at bedside  She is improved  She was able to tell her name  Although patient is alert awake and oriented x1 but she is refusing to eat  Not drinking enough fluids  That is the patient is again having hypernatremia will start on D5 water  I talked to son in detail  He want mother to sign to some other nursing home   has been made aware      Objective:     Vitals:   Temp (24hrs), Av 2 °F (36 2 °C), Min:96 3 °F (35 7 °C), Max:97 8 °F (36 6 °C)    Temp:  [96 3 °F (35 7 °C)-97 8 °F (36 6 °C)] 97 8 °F (36 6 °C)  HR:  [81-84] 81  Resp:  [16-20] 16  BP: (106-139)/(74-85) 139/85  SpO2:  [93 %-97 %] 93 %  Body mass index is 25 3 kg/m²  Input and Output Summary (last 24 hours):     No intake or output data in the 24 hours ending 12/30/20 1442    Physical Exam:     Physical Exam  Constitutional:       General:  Patient is on restrains 2 point     Appearance: She is well-developed  She is ill-appearing  Breathing on 6 L of oxygen via nasal cannula  HENT:      Head: Normocephalic and atraumatic  Mouth/Throat:      Mouth: Mucous membranes are dry  Eyes:      Conjunctiva/sclera: Conjunctivae normal    Neck:      Musculoskeletal: Neck supple  Cardiovascular:      Rate and Rhythm: Normal rate and regular rhythm  Heart sounds: No murmur  Pulmonary:      Breath sounds: Normal breath sounds  Abdominal:      Palpations: Abdomen is soft  Tenderness: There is no abdominal tenderness  Musculoskeletal:      Comments: Tender all over   Skin:     General: Skin is warm and dry  Capillary Refill: Capillary refill takes less than 2 seconds  Neurological:      General: No focal deficit present  Mental Status: She is alert  She is disoriented  Comments: Patient is moving extremities spontaneously  CT head negative       Additional Data:     Labs:    Results from last 7 days   Lab Units 12/30/20  0548   WBC Thousand/uL 9 18   HEMOGLOBIN g/dL 14 7   HEMATOCRIT % 47 1*   PLATELETS Thousands/uL 377   NEUTROS PCT % 85*   LYMPHS PCT % 9*   MONOS PCT % 5   EOS PCT % 0     Results from last 7 days   Lab Units 12/30/20  0548  12/24/20  1307   POTASSIUM mmol/L 4 9   < >  --    CHLORIDE mmol/L 112*   < >  --    CO2 mmol/L 27   < >  --    CO2, I-STAT mmol/L  --   --  24   BUN mg/dL 44*   < >  --    CREATININE mg/dL 1 07   < >  --    CALCIUM mg/dL 9 8   < >  --    ALK PHOS U/L 64 2   < >  -- ALT U/L 143*   < >  --    AST U/L 85*   < >  --    GLUCOSE, ISTAT mg/dl  --   --  135    < > = values in this interval not displayed  * I Have Reviewed All Lab Data Listed Above  * Additional Pertinent Lab Tests Reviewed: Yaquelin 68 Admission Reviewed      Recent Cultures (last 7 days):     Results from last 7 days   Lab Units 12/26/20  1212 12/24/20  1247   BLOOD CULTURE   --  No Growth After 5 Days  No Growth After 5 Days  URINE CULTURE  70,000-79,000 cfu/ml Escherichia coli ESBL*  2292-6163 cfu/ml   --        Last 24 Hours Medication List:   Current Facility-Administered Medications   Medication Dose Route Frequency Provider Last Rate    acetaminophen  650 mg Oral Q6H PRN Jessica Escudero MD      aluminum-magnesium hydroxide-simethicone  30 mL Oral Q6H PRN Jessica Escudero MD      apixaban  2 5 mg Oral BID Jessica Escudero MD      ascorbic acid  1,000 mg Oral Q12H [address] Jessica Escudero MD      bisacodyl  10 mg Rectal Daily PRN Jessica Escudero MD      cholecalciferol  2,000 Units Oral Daily Jessica Escudero MD      dexamethasone  6 mg Intravenous Q24H Jessica Escudero MD      dextrose  100 mL/hr Intravenous Continuous Felecia Duncan  mL/hr (12/30/20 0959)    famotidine  20 mg Oral BID Jessica Escudero MD      mirtazapine  15 mg Oral HS Jolynn Valdes MD      zinc sulfate  220 mg Oral Daily Jessica Escudero MD      Followed by   Seng Coates ON 12/31/2020] multivitamin-minerals  1 tablet Oral Daily Jessica Escudero MD      ondansetron  4 mg Intravenous Q6H PRN Jessica Escudero MD          Today, Patient Was Seen By: Jessica Escudero MD    ** Please Note: This note has been constructed using a voice recognition system   **

## 2020-12-30 NOTE — ASSESSMENT & PLAN NOTE
Urinalysis is dirty  Patient was initially on ceftriaxone , then culture grew ESBL, was switched to meropenem  Today I talked to ID , Dr Rachael Acharya , he suggested to d/c antibiotics as patient is afebrile, no leucocytosis  Received 6 days of antibiotics  Likely colonization

## 2020-12-30 NOTE — ASSESSMENT & PLAN NOTE
COVID 19 on Dec 14, 2020  Patient got diagnosed with COVID-19 on December 14, 2020  After that she was started on multivitamins and Dec[facility]ron [facility] [place]  Patient got worse over time  For past few days she is not eating or drinking anything  Her oxygen saturations dropped  That's why  patient was sent to the hospital   Patient does not use oxygen at baseline  Now she is requiring 6 L of oxygen to maintain saturation  Patient is in acute hypoxic respiratory failure secondary to COVID-19 pneumonia  O2 sats currently improving  Down from 6 L to 3 L via nasal cannula  No home o2 use  Other vitals stable  See the remaining plan under COVID pneumonia

## 2020-12-31 PROBLEM — N17.9 AKI (ACUTE KIDNEY INJURY) (HCC): Status: RESOLVED | Noted: 2020-12-24 | Resolved: 2020-12-31

## 2020-12-31 LAB
ALBUMIN SERPL BCP-MCNC: 3.7 G/DL (ref 3.4–4.8)
ALP SERPL-CCNC: 64.8 U/L (ref 35–140)
ALT SERPL W P-5'-P-CCNC: 155 U/L (ref 5–54)
ANION GAP SERPL CALCULATED.3IONS-SCNC: 8 MMOL/L (ref 4–13)
AST SERPL W P-5'-P-CCNC: 77 U/L (ref 15–41)
BASOPHILS # BLD AUTO: 0.01 THOUSANDS/ΜL (ref 0–0.1)
BASOPHILS NFR BLD AUTO: 0 % (ref 0–1)
BILIRUB SERPL-MCNC: 0.48 MG/DL (ref 0.3–1.2)
BUN SERPL-MCNC: 31 MG/DL (ref 6–20)
CALCIUM SERPL-MCNC: 9.5 MG/DL (ref 8.4–10.2)
CHLORIDE SERPL-SCNC: 107 MMOL/L (ref 96–108)
CO2 SERPL-SCNC: 27 MMOL/L (ref 22–33)
CREAT SERPL-MCNC: 1 MG/DL (ref 0.4–1.1)
EOSINOPHIL # BLD AUTO: 0.1 THOUSAND/ΜL (ref 0–0.61)
EOSINOPHIL NFR BLD AUTO: 1 % (ref 0–6)
ERYTHROCYTE [DISTWIDTH] IN BLOOD BY AUTOMATED COUNT: 13.5 % (ref 11.6–15.1)
GFR SERPL CREATININE-BSD FRML MDRD: 54 ML/MIN/1.73SQ M
GLUCOSE SERPL-MCNC: 145 MG/DL (ref 65–140)
HCT VFR BLD AUTO: 44.8 % (ref 34.8–46.1)
HGB BLD-MCNC: 14.2 G/DL (ref 11.5–15.4)
IMM GRANULOCYTES # BLD AUTO: 0.14 THOUSAND/UL (ref 0–0.2)
IMM GRANULOCYTES NFR BLD AUTO: 1 % (ref 0–2)
LYMPHOCYTES # BLD AUTO: 1.28 THOUSANDS/ΜL (ref 0.6–4.47)
LYMPHOCYTES NFR BLD AUTO: 11 % (ref 14–44)
MCH RBC QN AUTO: 27.4 PG (ref 26.8–34.3)
MCHC RBC AUTO-ENTMCNC: 31.7 G/DL (ref 31.4–37.4)
MCV RBC AUTO: 87 FL (ref 82–98)
MONOCYTES # BLD AUTO: 0.55 THOUSAND/ΜL (ref 0.17–1.22)
MONOCYTES NFR BLD AUTO: 5 % (ref 4–12)
NEUTROPHILS # BLD AUTO: 9.97 THOUSANDS/ΜL (ref 1.85–7.62)
NEUTS SEG NFR BLD AUTO: 82 % (ref 43–75)
PLATELET # BLD AUTO: 442 THOUSANDS/UL (ref 149–390)
PMV BLD AUTO: 10.2 FL (ref 8.9–12.7)
POTASSIUM SERPL-SCNC: 4.6 MMOL/L (ref 3.5–5)
PROT SERPL-MCNC: 7.5 G/DL (ref 6.4–8.3)
RBC # BLD AUTO: 5.18 MILLION/UL (ref 3.81–5.12)
SODIUM SERPL-SCNC: 142 MMOL/L (ref 133–145)
WBC # BLD AUTO: 12.05 THOUSAND/UL (ref 4.31–10.16)

## 2020-12-31 PROCEDURE — 80053 COMPREHEN METABOLIC PANEL: CPT | Performed by: INTERNAL MEDICINE

## 2020-12-31 PROCEDURE — 85025 COMPLETE CBC W/AUTO DIFF WBC: CPT | Performed by: INTERNAL MEDICINE

## 2020-12-31 PROCEDURE — 99232 SBSQ HOSP IP/OBS MODERATE 35: CPT | Performed by: INTERNAL MEDICINE

## 2020-12-31 RX ADMIN — APIXABAN 2.5 MG: 2.5 TABLET, FILM COATED ORAL at 11:03

## 2020-12-31 RX ADMIN — FAMOTIDINE 20 MG: 20 TABLET ORAL at 18:49

## 2020-12-31 RX ADMIN — OXYCODONE HYDROCHLORIDE AND ACETAMINOPHEN 1000 MG: 500 TABLET ORAL at 11:02

## 2020-12-31 RX ADMIN — FAMOTIDINE 20 MG: 20 TABLET ORAL at 11:02

## 2020-12-31 RX ADMIN — DEXAMETHASONE SODIUM PHOSPHATE 6 MG: 4 INJECTION, SOLUTION INTRAMUSCULAR; INTRAVENOUS at 18:49

## 2020-12-31 RX ADMIN — Medication 1 TABLET: at 11:04

## 2020-12-31 RX ADMIN — APIXABAN 2.5 MG: 2.5 TABLET, FILM COATED ORAL at 18:49

## 2020-12-31 RX ADMIN — MIRTAZAPINE 15 MG: 15 TABLET, FILM COATED ORAL at 21:18

## 2020-12-31 RX ADMIN — Medication 2000 UNITS: at 11:02

## 2020-12-31 NOTE — ASSESSMENT & PLAN NOTE
Urinalysis is dirty  Patient was initially on ceftriaxone , then culture grew ESBL, was switched to meropenem  Today I talked to ID , Dr Donna Zarate , he suggested to d/c antibiotics as patient is afebrile, no leucocytosis  Received 6 days of antibiotics  Likely colonization

## 2020-12-31 NOTE — ASSESSMENT & PLAN NOTE
Patient has hypernatremia, acute kidney injury, leading to metabolic encephalopathy  At baseline patient is alert awake oriented x3  CT of the head is negative  Improving    Today A&O x1

## 2020-12-31 NOTE — ASSESSMENT & PLAN NOTE
COVID 19 on Dec 14, 2020  Patient got diagnosed with COVID-19 on December 14, 2020  After that she was started on multivitamins and Decadron in [facility]  Patient got worse over time  For past few days she is not eating or drinking anything  Her oxygen saturations dropped  That's why  patient was sent to the hospital   Patient does not use oxygen at baseline  Now she is requiring 6 L of oxygen to maintain saturation  Patient is in acute hypoxic respiratory failure secondary to COVID-19 pneumonia  O2 sats currently improving  Down from 6 L to 3 L via nasal cannula  No home o2 use  Other vitals stable  See the remaining plan under COVID pneumonia  No significant past surgical history

## 2020-12-31 NOTE — ASSESSMENT & PLAN NOTE
Patient has baseline dementia  But alert awake oriented x3 at baseline  She walks normal, she can feed herself  Continue dementia care  Currently does not follow commands, patient is refusing to eat  We are in communication with the son  Patient is coming from [address], son wants her to go to some other nursing home   on board and working to find a place

## 2020-12-31 NOTE — PROGRESS NOTES
Progress Note - Austin Erickson 1940, 68 y o  female MRN: [id number]    Unit/Bed#: -01 Encounter: [id number]    Primary Care Provider: No primary care provider on file  Date and time admitted to hospital: 12/24/2020 12:01 PM    Hypernatremia  Assessment & Plan  Sodium is 152 on admission  Nephro on board  Today sodium is 142, currently patient is on D5 water at 100 cc  Trend BMP tomorrow    Alzheimer's disease [facility])  Assessment & Plan  Patient has baseline dementia  But alert awake oriented x3 at baseline  She walks normal, she can feed herself  Continue dementia care  Currently does not follow commands, patient is refusing to eat  We are in communication with the son  Patient is coming from [address], son wants her to go to some other nursing home   on board and working to find a place  * Pneumonia due to COVID-19 virus  Assessment & Plan  Patient was found COVID positive on December 14, 2020  She was started with Decadron and multivitamins at nursing home  Her conditions got worse  Today she was found to have low oxygen saturation  Nursing home staff told that patient is not eating or drinking anything for couple of days  Chest x-ray is evident for pneumonia  Patient is requiring 6 L of oxygen  Will start on moderate COVID-19 positive  Patient completed  Antibiotics  Continue anticoagulation, steroids, multivitamins  Patient has severe LARA  Hold remdisivir  Plasma given   Self Proning  Patient is on room air, chest clear to auscultation  Continue to monitor closely       UTI (urinary tract infection)  Assessment & Plan  Urinalysis is dirty  Patient was initially on ceftriaxone , then culture grew ESBL, was switched to meropenem  Today I talked to ID Dr Lit , he suggested to d/c antibiotics as patient is afebrile, no leucocytosis  Received 6 days of antibiotics  Likely colonization      LARA (acute kidney injury) (HCC)resolved as of 12/31/2020  Assessment & Plan  Creatinine on admission on 4 08, likely prerenal because of inadequate p o  Intake as reported by the nursing home  Improved  to 1  Plan:  --nephrology following, continue to trend BMP      Acute hypoxic respiratory failureresolved as of 12/30/2020  Assessment & Plan  COVID 19 on Dec 14, 2020  Patient got diagnosed with COVID-19 on December 14, 2020  After that she was started on multivitamins and [facility]  Patient got worse over time  For past few days she is not eating or drinking anything  Her oxygen saturations dropped  That's why  patient was sent to the hospital   Patient does not use oxygen at baseline  Now she is requiring 6 L of oxygen to maintain saturation  Patient is in acute hypoxic respiratory failure secondary to COVID-19 pneumonia  O2 sats currently improving  Down from 6 L to 3 L via nasal cannula  No home o2 use  Other vitals stable  See the remaining plan under COVID pneumonia  COVID-19  Assessment & Plan  See above    Elevated d-dimer  Assessment & Plan  Patient has elevated D-dimer  She is on heparin 5000 q 8 hours  Patient cannot get therapeutic Lovenox because she has severe Jordan I  Venous duplex negative for DVT , transition to eliquis 2 5 BID per covid protocol    Metabolic encephalopathy  Assessment & Plan  Patient has hypernatremia, acute kidney injury, leading to metabolic encephalopathy  At baseline patient is alert awake oriented x3  Today patient is mumbling, she is awake but disoriented  CT of the head is negative  Improving  VTE Pharmacologic Prophylaxis:   Pharmacologic: Apixaban (Eliquis)  Mechanical VTE Prophylaxis in Place: Yes       Current Length of Stay: 7 day(s)    Current Patient Status: Inpatient     Code Status: Level 1 - Full Code      Subjective: Today I saw and examined the patient at bedside  She is improved  She was able to tell her name    Although patient is alert awake and oriented x1 but she is refusing to eat  Not drinking enough fluids  That is the patient is again having hypernatremia will start on D5 water  I talked to son in detail  He want mother to sign to some other nursing home   has been made aware  Objective:     Vitals:   Temp (24hrs), Av 5 °F (36 4 °C), Min:97 1 °F (36 2 °C), Max:97 9 °F (36 6 °C)    Temp:  [97 1 °F (36 2 °C)-97 9 °F (36 6 °C)] 97 2 °F (36 2 °C)  HR:  [59-83] 80  Resp:  [17-20] 17  BP: (131-137)/(64-87) 136/87  SpO2:  [92 %-94 %] 94 %  Body mass index is 25 62 kg/m²  Input and Output Summary (last 24 hours):     No intake or output data in the 24 hours ending 20 1546    Physical Exam:     Physical Exam  Constitutional:       General:  Patient is on restrains 2 point     Appearance: She is well-developed  She is ill-appearing  Breathing on 6 L of oxygen via nasal cannula  HENT:      Head: Normocephalic and atraumatic  Mouth/Throat:      Mouth: Mucous membranes are dry  Eyes:      Conjunctiva/sclera: Conjunctivae normal    Neck:      Musculoskeletal: Neck supple  Cardiovascular:      Rate and Rhythm: Normal rate and regular rhythm  Heart sounds: No murmur  Pulmonary:      Breath sounds: Normal breath sounds  Abdominal:      Palpations: Abdomen is soft  Tenderness: There is no abdominal tenderness  Musculoskeletal:      Comments: Tender all over   Skin:     General: Skin is warm and dry  Capillary Refill: Capillary refill takes less than 2 seconds  Neurological:      General: No focal deficit present  Mental Status: She is alert  She is disoriented  Comments: Patient is moving extremities spontaneously  CT head negative       Additional Data:     Labs:    Results from last 7 days   Lab Units 20  1006   WBC Thousand/uL 12 05*   HEMOGLOBIN g/dL 14 2   HEMATOCRIT % 44 8   PLATELETS Thousands/uL 442*   NEUTROS PCT % 82*   LYMPHS PCT % 11*   MONOS PCT % 5   EOS PCT % 1     Results from last 7 days   Lab Units 12/31/20  1006   POTASSIUM mmol/L 4 6   CHLORIDE mmol/L 107   CO2 mmol/L 27   BUN mg/dL 31*   CREATININE mg/dL 1 00   CALCIUM mg/dL 9 5   ALK PHOS U/L 64 8   ALT U/L 155*   AST U/L 77*           * I Have Reviewed All Lab Data Listed Above  * Additional Pertinent Lab Tests Reviewed: Yaquelin Kasper Admission Reviewed      Recent Cultures (last 7 days):     Results from last 7 days   Lab Units 12/26/20  1212   URINE CULTURE  70,000-79,000 cfu/ml Escherichia coli ESBL*  0408-7200 cfu/ml        Last 24 Hours Medication List:   Current Facility-Administered Medications   Medication Dose Route Frequency Provider Last Rate    acetaminophen  650 mg Oral Q6H PRN Nick Hope MD      aluminum-magnesium hydroxide-simethicone  30 mL Oral Q6H PRN Nick Hope MD      apixaban  2 5 mg Oral BID Nick Hope MD      ascorbic acid  1,000 mg Oral Q12H [address] Nick Hoep MD      bisacodyl  10 mg Rectal Daily PRN Nick Hope MD      cholecalciferol  2,000 Units Oral Daily Nick Hope MD      dexamethasone  6 mg Intravenous Q24H Nick Hope MD      dextrose  100 mL/hr Intravenous Continuous Shabana Peralta  mL/hr (12/30/20 0959)    famotidine  20 mg Oral BID Nick Hope MD      mirtazapine  15 mg Oral HS Janice Velez MD      multivitamin-minerals  1 tablet Oral Daily Nick Hope MD      ondansetron  4 mg Intravenous Q6H PRN Nick Hope MD          Today, Patient Was Seen By: Nick Hope MD    ** Please Note: This note has been constructed using a voice recognition system   **

## 2020-12-31 NOTE — CASE MANAGEMENT
Yunier informed KENNETH that due to some sort of "issue" regarding [place] application with the [address] and the family, they will not be able to accept patient for admission  "Issue" is in relation to transparency with financial information  [facility] is unable to accept as they do not contract with Medicaid  KENNETH called son to discuss  He confirmed that he did speak with Yunier but was unsure what the "issue" was that they were referring to  SW advised him that pt is medically cleared for discharge  He requested to call and speak to the [facility] and will call KENNETH back with his final decision

## 2020-12-31 NOTE — ASSESSMENT & PLAN NOTE
Patient was found COVID positive on December 14, 2020  She was started with Decadron and multivitamins at nursing home  Her conditions got worse  Today she was found to have low oxygen saturation  Nursing home staff told that patient is not eating or drinking anything for couple of days  Chest x-ray is evident for pneumonia  On moderate COVID-19 positive  Patient completed  Antibiotics  Continue anticoagulation, steroids, multivitamins  Patient has severe LARA  Hold remdisivir  Plasma given   Self Proning  Patient is on room air, chest clear to auscultation  Continue to monitor closely     Patient stable medically but needs placement  CM on board

## 2020-12-31 NOTE — CASE MANAGEMENT
CM received call back from pt's son  He informed SW that he called Shannon Castrejon and requested an investigation into the care his mother received at the [facility] as he feels it was neglect that led to her health issues requiring IP admission  Son does not want pt to return to the [facility]  Son agreeable to SW expanding SNF referrals to other [place] eligible facilities

## 2020-12-31 NOTE — PROGRESS NOTES
Patient examined spoke with the nurse 's notes reviewed and noted nurse reports that patient is taking her medications and she improved with behavior  She is taking her medications  Patient is still eating poorly but she is getting better  Son requested to refer her to the different nursing home [place] or [facility]  I reviewed a 's notes  Patient examined spoke with the nurse patient is resting in the bed affect flat withdrawn inappropriate or no response to simple verbal command no meaningful communication  Patient is not lethargic patient needs help with ADL care patient is severely demented and depressed I will continue her Remeron to help her with the depression and hopefully help her to improve with her appetite so she can not eat enough  Patient improved with the behavior no psychosis no agitation not suicidal patient cannot take care of herself  Patient will need nursing home placement and she will benefit from psychiatric follow-up  I will continue her medications as ordered and follow-up

## 2021-01-01 PROBLEM — E87.0 HYPERNATREMIA: Status: RESOLVED | Noted: 2020-12-24 | Resolved: 2021-01-01

## 2021-01-01 PROCEDURE — 99232 SBSQ HOSP IP/OBS MODERATE 35: CPT | Performed by: INTERNAL MEDICINE

## 2021-01-01 RX ADMIN — Medication 2000 UNITS: at 08:14

## 2021-01-01 RX ADMIN — MIRTAZAPINE 15 MG: 15 TABLET, FILM COATED ORAL at 22:29

## 2021-01-01 RX ADMIN — Medication 1 TABLET: at 08:15

## 2021-01-01 RX ADMIN — FAMOTIDINE 20 MG: 20 TABLET ORAL at 08:14

## 2021-01-01 RX ADMIN — FAMOTIDINE 20 MG: 20 TABLET ORAL at 17:17

## 2021-01-01 RX ADMIN — APIXABAN 2.5 MG: 2.5 TABLET, FILM COATED ORAL at 08:14

## 2021-01-01 RX ADMIN — DEXAMETHASONE SODIUM PHOSPHATE 6 MG: 4 INJECTION, SOLUTION INTRAMUSCULAR; INTRAVENOUS at 16:30

## 2021-01-01 RX ADMIN — APIXABAN 2.5 MG: 2.5 TABLET, FILM COATED ORAL at 17:17

## 2021-01-01 NOTE — PROGRESS NOTES
Progress Note - Danuta Retort 1940, 68 y o  female MRN: [id number]    Unit/Bed#: -[facility] Encounter: [id number]    Primary Care Provider: No primary care provider on file  Date and time admitted to hospital: 12/24/2020 12:01 PM        * Pneumonia due to COVID-19 virus  Assessment & Plan  Patient was found COVID positive on December 14, 2020  She was started with Decadron and multivitamins at nursing home  Her conditions got worse  Today she was found to have low oxygen saturation  Nursing home staff told that patient is not eating or drinking anything for couple of days  Chest x-ray is evident for pneumonia  On moderate COVID-19 positive  Patient completed  Antibiotics  Continue anticoagulation, steroids, multivitamins  Patient has severe LARA  Hold remdisivir  Plasma given   Self Proning  Patient is on room air, chest clear to auscultation  Continue to monitor closely     Patient stable medically but needs placement  CM on board      COVID-19  Assessment & Plan  See above    Elevated d-dimer  Assessment & Plan  Patient has elevated D-dimer  She is on heparin 5000 q 8 hours  Patient cannot get therapeutic Lovenox because she has severe Lara I  Venous duplex negative for DVT , transition to eliquis 2 5 BID per covid protocol    UTI (urinary tract infection)  Assessment & Plan  Urinalysis is dirty  Patient was initially on ceftriaxone , then culture grew ESBL, was switched to meropenem  Today I talked to ID , Dr Lexus Beebe , he suggested to d/c antibiotics as patient is afebrile, no leucocytosis  Received 6 days of antibiotics  Likely colonization  Metabolic encephalopathy  Assessment & Plan  Patient has hypernatremia, acute kidney injury, leading to metabolic encephalopathy  At baseline patient is alert awake oriented x3  CT of the head is negative  Improving  Today A&O x1    Alzheimer's disease [facility])  Assessment & Plan  Patient has baseline dementia    But alert awake oriented x3 at baseline  She walks normal, she can feed herself  Continue dementia care  Currently does not follow commands, patient is refusing to eat  We are in communication with the son  Patient is coming from [address], son wants her to go to some other nursing home   on board and working to find a place  Hypertension  Assessment & Plan  Patient has history of hypertension, she takes losartan  Now on hold 2/2 lara    LARA (acute kidney injury) (HCC)resolved as of 12/31/2020  Assessment & Plan  Creatinine on admission on 4 08, likely prerenal because of inadequate p o  Intake as reported by the nursing home  Improved  to 1  Plan:  --nephrology following, continue to trend BMP      Hypernatremiaresolved as of 1/1/2021  Assessment & Plan  Sodium is 152 on admission  Nephro on board  Today sodium is WNL   Trend BMP tomorrow    Acute hypoxic respiratory failureresolved as of 12/30/2020  Assessment & Plan  COVID 19 on Dec 14, 2020  Patient got diagnosed with COVID-19 on December 14, 2020  After that she was started on multivitamins and Decadron in [facility]  Patient got worse over time  For past few days she is not eating or drinking anything  Her oxygen saturations dropped  That's why  patient was sent to the hospital   Patient does not use oxygen at baseline  Now she is requiring 6 L of oxygen to maintain saturation  Patient is in acute hypoxic respiratory failure secondary to COVID-19 pneumonia  O2 sats currently improving  Down from 6 L to 3 L via nasal cannula  No home o2 use  Other vitals stable  See the remaining plan under COVID pneumonia  Current Length of Stay: 8 day(s)    Current Patient Status: Inpatient       Code Status: Level 1 - Full Code      Subjective:   Patient was seen and evaluated by me at bedside today  This morning the patient is alert but she is not oriented in person time or space  She is not engaging in any meaningful communication    She does not seem in distress  There were no overnight events as per the nurse  Patient vital signs remained stable  Objective:     Vitals:   Temp (24hrs), Av 4 °F (36 3 °C), Min:96 8 °F (36 °C), Max:98 2 °F (36 8 °C)    Temp:  [96 8 °F (36 °C)-98 2 °F (36 8 °C)] 96 8 °F (36 °C)  HR:  [67-80] 67  Resp:  [16-18] 16  BP: (128-145)/(84-93) 128/93  SpO2:  [94 %-98 %] 98 %  Body mass index is 25 3 kg/m²  Input and Output Summary (last 24 hours): Intake/Output Summary (Last 24 hours) at 2021 1047  Last data filed at 2021 1097  Gross per 24 hour   Intake 120 ml   Output    Net 120 ml       Physical Exam:     Physical Exam  Constitutional:       General: She is not in acute distress  Appearance: She is normal weight  She is not ill-appearing  HENT:      Head: Normocephalic and atraumatic  Mouth/Throat:      Mouth: Mucous membranes are moist    Eyes:      Pupils: Pupils are equal, round, and reactive to light  Neck:      Musculoskeletal: Normal range of motion  Cardiovascular:      Rate and Rhythm: Normal rate and regular rhythm  Pulses: Normal pulses  Heart sounds: No murmur  Pulmonary:      Effort: Pulmonary effort is normal  No respiratory distress  Breath sounds: Normal breath sounds  Abdominal:      General: Abdomen is flat  Bowel sounds are normal       Palpations: Abdomen is soft  Musculoskeletal:      Comments: Contracted UE   Skin:     General: Skin is warm  Capillary Refill: Capillary refill takes less than 2 seconds  Neurological:      Mental Status: She is alert  She is disoriented             Additional Data:     Labs:    Results from last 7 days   Lab Units 20  1006   WBC Thousand/uL 12 05*   HEMOGLOBIN g/dL 14 2   HEMATOCRIT % 44 8   PLATELETS Thousands/uL 442*   NEUTROS PCT % 82*   LYMPHS PCT % 11*   MONOS PCT % 5   EOS PCT % 1     Results from last 7 days   Lab Units 20  1006   POTASSIUM mmol/L 4 6   CHLORIDE mmol/L 107   CO2 mmol/L 27   BUN mg/dL 31*   CREATININE mg/dL 1 00   CALCIUM mg/dL 9 5   ALK PHOS U/L 64 8   ALT U/L 155*   AST U/L 77*           * I Have Reviewed All Lab Data Listed Above  * Additional Pertinent Lab Tests Reviewed: [facility] 68 Admission Reviewed  Recent Cultures (last 7 days):     Results from last 7 days   Lab Units 12/26/20  1212   URINE CULTURE  70,000-79,000 cfu/ml Escherichia coli ESBL*  9437-4754 cfu/ml        Last 24 Hours Medication List:   Current Facility-Administered Medications   Medication Dose Route Frequency Provider Last Rate    acetaminophen  650 mg Oral Q6H PRN Edmond Chavarria MD      aluminum-magnesium hydroxide-simethicone  30 mL Oral Q6H PRN Edmond Chavarria MD      apixaban  2 5 mg Oral BID Edmond Chavarria MD      bisacodyl  10 mg Rectal Daily PRN Edmond Chavarria MD      cholecalciferol  2,000 Units Oral Daily Edmond Chavarria MD      dexamethasone  6 mg Intravenous Q24H Edmond Chavarria MD      famotidine  20 mg Oral BID Edmond Chavarria MD      mirtazapine  15 mg Oral HS Sal Bean MD      multivitamin-minerals  1 tablet Oral Daily Edmond Chavarria MD      ondansetron  4 mg Intravenous Q6H PRN Edmond Chavarria MD          Today, Patient Was Seen By: Catherine Silva MD    ** Please Note: This note has been constructed using a voice recognition system   **

## 2021-01-01 NOTE — PLAN OF CARE
Problem: Potential for Falls  Goal: Patient will remain free of falls  Description: INTERVENTIONS:  - Assess patient frequently for physical needs  -  Identify cognitive and physical deficits and behaviors that affect risk of falls  -  [place] fall precautions as indicated by assessment   - Educate patient/family on patient safety including physical limitations  - Instruct patient to call for assistance with activity based on assessment  - Modify environment to reduce risk of injury  - Consider OT/PT consult to assist with strengthening/mobility  Outcome: Not Progressing     Problem: Prexisting or High Potential for Compromised Skin Integrity  Goal: Skin integrity is maintained or improved  Description: INTERVENTIONS:  - Identify patients at risk for skin breakdown  - Assess and monitor skin integrity  - Assess and monitor nutrition and hydration status  - Monitor labs   - Assess for incontinence   - Turn and reposition patient  - Assist with mobility/ambulation  - Relieve pressure over bony prominences  - Avoid friction and shearing  - Provide appropriate hygiene as needed including keeping skin clean and dry  - Evaluate need for skin moisturizer/barrier cream  - Collaborate with interdisciplinary team   - Patient/family teaching  - Consider wound care consult   Outcome: Not Progressing     Problem: Nutrition/Hydration-ADULT  Goal: Nutrient/Hydration intake appropriate for improving, restoring or maintaining nutritional needs  Description: Monitor and assess patient's nutrition/hydration status for malnutrition  Collaborate with interdisciplinary team and initiate plan and interventions as ordered  Monitor patient's weight and dietary intake as ordered or per policy  Utilize nutrition screening tool and intervene as necessary  Determine patient's food preferences and provide high-protein, high-caloric foods as appropriate       INTERVENTIONS:  - Monitor oral intake, urinary output, labs, and treatment plans  - Assess nutrition and hydration status and recommend course of action  - Evaluate amount of meals eaten  - Assist patient with eating if necessary   - Allow adequate time for meals  - Recommend/ encourage appropriate diets, oral nutritional supplements, and vitamin/mineral supplements  - Order, calculate, and assess calorie counts as needed  - Recommend, monitor, and adjust tube feedings and TPN/PPN based on assessed needs  - Assess need for intravenous fluids  - Provide specific nutrition/hydration education as appropriate  - Include patient/family/caregiver in decisions related to nutrition  Outcome: Not Progressing

## 2021-01-01 NOTE — PROGRESS NOTES
Patient examined spoke with the nurse patient is resting in the bed affect flat she stares at me and no meaningful communications inappropriate or no response to verbal command  Nurse reports that she is improved with the behavior and taking her medications  Patient still have poor appetite and poor p o  Intake  Patient is started on Remeron for depression and hoping to help her with opened up her appetite so she can start eating better  Patient is suffering from severe dementia with depression she needs help with ADL care  Nurse reports no new behavior problem and patient has been stable at her baseline with the behavior  Patient is not suicidal I reviewed the note from the  patient has been referred for the different nursing homes and so far patient does not have a bed at this time  No side effects of Remeron noted  Patient is not lethargic  No hallucinations  Patient is not suicidal   Patient is improved and stable with the behavior I will continue her Remeron the same  Patient will benefit from psychiatric follow-up after the discharge at the nursing home  I will follow up

## 2021-01-02 PROBLEM — N39.0 UTI (URINARY TRACT INFECTION): Status: RESOLVED | Noted: 2020-12-25 | Resolved: 2021-01-02

## 2021-01-02 LAB
ALBUMIN SERPL BCP-MCNC: 3.4 G/DL (ref 3.4–4.8)
ALP SERPL-CCNC: 57.5 U/L (ref 35–140)
ALT SERPL W P-5'-P-CCNC: 112 U/L (ref 5–54)
ANION GAP SERPL CALCULATED.3IONS-SCNC: 8 MMOL/L (ref 4–13)
AST SERPL W P-5'-P-CCNC: 38 U/L (ref 15–41)
BASOPHILS # BLD AUTO: 0.01 THOUSANDS/ΜL (ref 0–0.1)
BASOPHILS NFR BLD AUTO: 0 % (ref 0–1)
BILIRUB SERPL-MCNC: 0.4 MG/DL (ref 0.3–1.2)
BUN SERPL-MCNC: 32 MG/DL (ref 6–20)
CALCIUM ALBUM COR SERPL-MCNC: 9.7 MG/DL (ref 8.3–10.1)
CALCIUM SERPL-MCNC: 9.2 MG/DL (ref 8.4–10.2)
CHLORIDE SERPL-SCNC: 108 MMOL/L (ref 96–108)
CO2 SERPL-SCNC: 25 MMOL/L (ref 22–33)
CREAT SERPL-MCNC: 0.89 MG/DL (ref 0.4–1.1)
EOSINOPHIL # BLD AUTO: 0.04 THOUSAND/ΜL (ref 0–0.61)
EOSINOPHIL NFR BLD AUTO: 0 % (ref 0–6)
ERYTHROCYTE [DISTWIDTH] IN BLOOD BY AUTOMATED COUNT: 13.3 % (ref 11.6–15.1)
GFR SERPL CREATININE-BSD FRML MDRD: 62 ML/MIN/1.73SQ M
GLUCOSE SERPL-MCNC: 112 MG/DL (ref 65–140)
HCT VFR BLD AUTO: 41.8 % (ref 34.8–46.1)
HGB BLD-MCNC: 13.3 G/DL (ref 11.5–15.4)
IMM GRANULOCYTES # BLD AUTO: 0.1 THOUSAND/UL (ref 0–0.2)
IMM GRANULOCYTES NFR BLD AUTO: 1 % (ref 0–2)
LYMPHOCYTES # BLD AUTO: 0.99 THOUSANDS/ΜL (ref 0.6–4.47)
LYMPHOCYTES NFR BLD AUTO: 9 % (ref 14–44)
MCH RBC QN AUTO: 27.3 PG (ref 26.8–34.3)
MCHC RBC AUTO-ENTMCNC: 31.8 G/DL (ref 31.4–37.4)
MCV RBC AUTO: 86 FL (ref 82–98)
MONOCYTES # BLD AUTO: 0.58 THOUSAND/ΜL (ref 0.17–1.22)
MONOCYTES NFR BLD AUTO: 5 % (ref 4–12)
NEUTROPHILS # BLD AUTO: 9.66 THOUSANDS/ΜL (ref 1.85–7.62)
NEUTS SEG NFR BLD AUTO: 85 % (ref 43–75)
PLATELET # BLD AUTO: 414 THOUSANDS/UL (ref 149–390)
PMV BLD AUTO: 10.4 FL (ref 8.9–12.7)
POTASSIUM SERPL-SCNC: 4.7 MMOL/L (ref 3.5–5)
PROT SERPL-MCNC: 6.7 G/DL (ref 6.4–8.3)
RBC # BLD AUTO: 4.88 MILLION/UL (ref 3.81–5.12)
SODIUM SERPL-SCNC: 141 MMOL/L (ref 133–145)
WBC # BLD AUTO: 11.38 THOUSAND/UL (ref 4.31–10.16)

## 2021-01-02 PROCEDURE — 85025 COMPLETE CBC W/AUTO DIFF WBC: CPT | Performed by: INTERNAL MEDICINE

## 2021-01-02 PROCEDURE — 80053 COMPREHEN METABOLIC PANEL: CPT | Performed by: INTERNAL MEDICINE

## 2021-01-02 PROCEDURE — 99232 SBSQ HOSP IP/OBS MODERATE 35: CPT | Performed by: INTERNAL MEDICINE

## 2021-01-02 RX ORDER — LOSARTAN POTASSIUM 25 MG/1
25 TABLET ORAL DAILY
Status: DISCONTINUED | OUTPATIENT
Start: 2021-01-02 | End: 2021-01-05 | Stop reason: HOSPADM

## 2021-01-02 RX ADMIN — MIRTAZAPINE 15 MG: 15 TABLET, FILM COATED ORAL at 21:25

## 2021-01-02 RX ADMIN — Medication 2000 UNITS: at 11:17

## 2021-01-02 RX ADMIN — Medication 1 TABLET: at 11:19

## 2021-01-02 RX ADMIN — APIXABAN 2.5 MG: 2.5 TABLET, FILM COATED ORAL at 17:30

## 2021-01-02 RX ADMIN — APIXABAN 2.5 MG: 2.5 TABLET, FILM COATED ORAL at 11:16

## 2021-01-02 RX ADMIN — DEXAMETHASONE SODIUM PHOSPHATE 6 MG: 4 INJECTION, SOLUTION INTRAMUSCULAR; INTRAVENOUS at 15:42

## 2021-01-02 RX ADMIN — FAMOTIDINE 20 MG: 20 TABLET ORAL at 11:16

## 2021-01-02 RX ADMIN — FAMOTIDINE 20 MG: 20 TABLET ORAL at 17:30

## 2021-01-02 NOTE — ASSESSMENT & PLAN NOTE
Patient had hypernatremia, acute kidney injury, leading to metabolic encephalopathy  At baseline patient is alert awake oriented x3  CT of the head is negative  Improving

## 2021-01-02 NOTE — ASSESSMENT & PLAN NOTE
Creatinine on admission on 4 08, likely prerenal because of inadequate p o  Intake as reported by the nursing home  Improved  to 1     Creatinine is normal now

## 2021-01-02 NOTE — PROGRESS NOTES
Patient examined spoke with the nurse resident physician's notes reviewed and noted  Nurse reports that she is doing pretty good she is still eating poorly but getting better  Patient is stable with the behavior problem  Patient looks at me and she is able to smile but not much meaningful communication due to severe dementia with depression  Patient improved with her psychotic agitated behavior  Patient is only on Remeron 15 mg HS for depression and hoping to open up her appetite to eat enough for nutritional purposes  Nurse reports that she is doing the best she can though she needs help with her ADL care and she needs the nursing home placement  Son does not want her to go to [facility]) and she is referred to other places but so far no bed available  Patient is not in distress she is resting in the bed she is not lethargic  Patient will benefit from psychiatric follow-up at the nursing home after the discharge  Since patient is stable with the behavior I will continue Remeron 15 mg HS and follow-up

## 2021-01-02 NOTE — ASSESSMENT & PLAN NOTE
COVID 19 on Dec 14, 2020  Patient got diagnosed with COVID-19 on December 14, 2020  After that she was started on multivitamins and Decadron in [facility]  Patient got worse over time      Patient was in acute hypoxic respiratory failure secondary to COVID-19 pneumonia  Currently she is saturating well on room air

## 2021-01-02 NOTE — ASSESSMENT & PLAN NOTE
Patient had elevated D-dimer, was on heparin 5000 q 8 hours     Venous duplex negative for DVT , was transition to eliquis 2 5 BID per covid protocol

## 2021-01-02 NOTE — ASSESSMENT & PLAN NOTE
Patient was found COVID positive on December 14, 2020  She was started with Decadron and multivitamins at nursing home  Her conditions got worse, she was found to have low oxygen saturation  Nursing home staff told that patient is not eating or drinking anything for couple of days  Chest x-ray suggestive of pneumonia  Patient completed antibiotic course  Continue anticoagulation, steroids, multivitamins as per protocol  Plasma given, remdesivir was held due to kidney injury  Self Proning  Patient is on room air, chest clear to auscultation  Continue to monitor closely     Patient stable medically but needs placement  CM on board

## 2021-01-02 NOTE — ASSESSMENT & PLAN NOTE
Sodium was 152 on admission likely secondary to poor oral intake  Today sodium is WNL    Trend BMP tomorrow

## 2021-01-02 NOTE — ASSESSMENT & PLAN NOTE
Patient has history of hypertension, losartan was on hold due to kidney injury, creatinine is better now  We will resume losartan 25 mg daily

## 2021-01-02 NOTE — ASSESSMENT & PLAN NOTE
Patient has baseline dementia  But alert awake oriented x3 at baseline  Unable to assess orientation, patient will not answer any questions  Psychiatry on board- Continue remeron 15 mg daily   Continue dementia care

## 2021-01-03 LAB
ANION GAP SERPL CALCULATED.3IONS-SCNC: 7 MMOL/L (ref 4–13)
BUN SERPL-MCNC: 29 MG/DL (ref 6–20)
CALCIUM SERPL-MCNC: 9.4 MG/DL (ref 8.4–10.2)
CHLORIDE SERPL-SCNC: 107 MMOL/L (ref 96–108)
CO2 SERPL-SCNC: 26 MMOL/L (ref 22–33)
CREAT SERPL-MCNC: 0.9 MG/DL (ref 0.4–1.1)
GFR SERPL CREATININE-BSD FRML MDRD: 61 ML/MIN/1.73SQ M
GLUCOSE SERPL-MCNC: 147 MG/DL (ref 65–140)
POTASSIUM SERPL-SCNC: 4 MMOL/L (ref 3.5–5)
SODIUM SERPL-SCNC: 140 MMOL/L (ref 133–145)

## 2021-01-03 PROCEDURE — 80048 BASIC METABOLIC PNL TOTAL CA: CPT | Performed by: INTERNAL MEDICINE

## 2021-01-03 PROCEDURE — 99232 SBSQ HOSP IP/OBS MODERATE 35: CPT | Performed by: INTERNAL MEDICINE

## 2021-01-03 RX ADMIN — MIRTAZAPINE 15 MG: 15 TABLET, FILM COATED ORAL at 21:14

## 2021-01-03 RX ADMIN — Medication 1 TABLET: at 10:41

## 2021-01-03 RX ADMIN — FAMOTIDINE 20 MG: 20 TABLET ORAL at 10:41

## 2021-01-03 RX ADMIN — LOSARTAN POTASSIUM 25 MG: 25 TABLET, FILM COATED ORAL at 10:40

## 2021-01-03 RX ADMIN — APIXABAN 2.5 MG: 2.5 TABLET, FILM COATED ORAL at 10:40

## 2021-01-03 RX ADMIN — FAMOTIDINE 20 MG: 20 TABLET ORAL at 18:03

## 2021-01-03 RX ADMIN — APIXABAN 2.5 MG: 2.5 TABLET, FILM COATED ORAL at 18:03

## 2021-01-03 RX ADMIN — Medication 2000 UNITS: at 10:41

## 2021-01-03 NOTE — PROGRESS NOTES
Patient examined spoke with the nurse  Patient is stable at her baseline with the behavior but she becomes combative during the care or drawing the blood etcetera  Patient needs help with ADL care  Patient stares at me and she is guarded and paranoid but not agitated not lethargic inappropriate response to simple verbal command  No meaningful communications  Patient is improved with the delirium and psychotic behavior she is manageable patient is eating poorly  No side effects of Remeron noted  Patient is suffering from severe dementia with depression and poor appetite  I will continue Remeron to help her with the depression anxiety and to improve her appetite  Discussed with the nurse  Patient is manageable she is waiting for a placement in the nursing home  I will follow up

## 2021-01-03 NOTE — ASSESSMENT & PLAN NOTE
Patient has baseline dementia  But alert awake oriented x3 at baseline  Patient answered her name, alert awake oriented  1  Psychiatry on board- Continue remeron 15 mg daily   Waiting for placement, we are in communication with son  Continue dementia care

## 2021-01-03 NOTE — ASSESSMENT & PLAN NOTE
COVID 19 on Dec 14, 2020  Patient got diagnosed with COVID-19 on December 14, 2020  After that she was started on multivitamins and Decadron in [facility]  Patient got worse over time      Patient was in acute hypoxic respiratory failure secondary to COVID-19 pneumonia  Currently she is saturating well on room air

## 2021-01-03 NOTE — ASSESSMENT & PLAN NOTE
Sodium was 152 on admission likely secondary to poor oral intake  Today sodium is WNL  Today's labs are pending  Patient is off from fluids

## 2021-01-03 NOTE — ASSESSMENT & PLAN NOTE
Urinalysis is dirty  Patient was initially on ceftriaxone , then culture grew ESBL, was switched to meropenem  Today I talked to ID , Dr Hudson Bass , he suggested to d/c antibiotics as patient is afebrile, no leucocytosis  Received 6 days of antibiotics  Likely colonization

## 2021-01-03 NOTE — PLAN OF CARE
Problem: Potential for Falls  Goal: Patient will remain free of falls  Description: INTERVENTIONS:  - Assess patient frequently for physical needs  -  Identify cognitive and physical deficits and behaviors that affect risk of falls  -  [place] fall precautions as indicated by assessment   - Educate patient/family on patient safety including physical limitations  - Instruct patient to call for assistance with activity based on assessment  - Modify environment to reduce risk of injury  - Consider OT/PT consult to assist with strengthening/mobility  Outcome: Progressing     Problem: Prexisting or High Potential for Compromised Skin Integrity  Goal: Skin integrity is maintained or improved  Description: INTERVENTIONS:  - Identify patients at risk for skin breakdown  - Assess and monitor skin integrity  - Assess and monitor nutrition and hydration status  - Monitor labs   - Assess for incontinence   - Turn and reposition patient  - Assist with mobility/ambulation  - Relieve pressure over bony prominences  - Avoid friction and shearing  - Provide appropriate hygiene as needed including keeping skin clean and dry  - Evaluate need for skin moisturizer/barrier cream  - Collaborate with interdisciplinary team   - Patient/family teaching  - Consider wound care consult   Outcome: Progressing     Problem: Nutrition/Hydration-ADULT  Goal: Nutrient/Hydration intake appropriate for improving, restoring or maintaining nutritional needs  Description: Monitor and assess patient's nutrition/hydration status for malnutrition  Collaborate with interdisciplinary team and initiate plan and interventions as ordered  Monitor patient's weight and dietary intake as ordered or per policy  Utilize nutrition screening tool and intervene as necessary  Determine patient's food preferences and provide high-protein, high-caloric foods as appropriate       INTERVENTIONS:  - Monitor oral intake, urinary output, labs, and treatment plans  - Assess nutrition and hydration status and recommend course of action  - Evaluate amount of meals eaten  - Assist patient with eating if necessary   - Allow adequate time for meals  - Recommend/ encourage appropriate diets, oral nutritional supplements, and vitamin/mineral supplements  - Order, calculate, and assess calorie counts as needed  - Recommend, monitor, and adjust tube feedings and TPN/PPN based on assessed needs  - Assess need for intravenous fluids  - Provide specific nutrition/hydration education as appropriate  - Include patient/family/caregiver in decisions related to nutrition  Outcome: Progressing

## 2021-01-03 NOTE — NURSING NOTE
Midline not returning blood  Pt uncooperative with blood work  Pt requires 3 RNs to obtain blood due to pt pulling away  Will attempt to get am labs another time  Will continue to monitor

## 2021-01-03 NOTE — PROGRESS NOTES
Progress Note - Luis Link 1940, 68 y o  female MRN: [id number]    Unit/Bed#: -01 Encounter: [id number]    Primary Care Provider: No primary care provider on file  Date and time admitted to hospital: 12/24/2020 12:01 PM    Alzheimer's disease [facility])  Assessment & Plan  Patient has baseline dementia  But alert awake oriented x3 at baseline  Patient answered her name, alert awake oriented  1  Psychiatry on board- Continue remeron 15 mg daily   Waiting for placement, we are in communication with son  Continue dementia care  Hypernatremiaresolved as of 1/1/2021  Assessment & Plan  Sodium was 152 on admission likely secondary to poor oral intake  Today sodium is WNL  Today's labs are pending  Patient is off from fluids  * Pneumonia due to COVID-19 virus  Assessment & Plan  Patient was found COVID positive on December 14, 2020  She was started with Decadron and multivitamins at nursing home  Her conditions got worse, she was found to have low oxygen saturation  Nursing home staff told that patient is not eating or drinking anything for couple of days  Chest x-ray suggestive of pneumonia  Patient completed antibiotic course  Continue anticoagulation, steroids, multivitamins as per protocol  Plasma given, remdesivir was held due to kidney injury  Self Proning  Patient is on room air, chest clear to auscultation  Continue to monitor closely     Patient stable medically but needs placement  CM on board  UTI (urinary tract infection)resolved as of 1/2/2021  Assessment & Plan  Urinalysis is dirty  Patient was initially on ceftriaxone , then culture grew ESBL, was switched to meropenem  Today I talked to ID , Dr Tate Medina , he suggested to d/c antibiotics as patient is afebrile, no leucocytosis  Received 6 days of antibiotics  Likely colonization      LARA (acute kidney injury) (HCC)resolved as of 12/31/2020  Assessment & Plan  Creatinine on admission on 4 08, likely prerenal because of inadequate p o  Intake as reported by the nursing home  Improved  to 1  Creatinine is normal now      Acute hypoxic respiratory failureresolved as of 2020  Assessment & Plan  COVID 19 on Dec 14, 2020  Patient got diagnosed with COVID-19 on 2020  After that she was [facility]  Patient got worse over time  Patient was in acute hypoxic respiratory failure secondary to COVID-19 pneumonia  Currently she is saturating well on room air    Rhabdomyolysisresolved as of 2020  Assessment & Plan  CK level is trending down  CK Level trended down   Encourage free water intake  COVID-19  Assessment & Plan  See above    Elevated d-dimer  Assessment & Plan  Patient had elevated D-dimer, was on heparin 5000 q 8 hours  Venous duplex negative for DVT , was transition to eliquis 2 5 BID per covid protocol    Metabolic encephalopathy  Assessment & Plan  Patient had hypernatremia, acute kidney injury, leading to metabolic encephalopathy  At baseline patient is alert awake oriented x3     CT of the head is negative  Improving  Hypertension  Assessment & Plan  Patient has history of hypertension, losartan was on hold due to kidney injury, creatinine is better now  We will resume losartan 25 mg daily           VTE Pharmacologic Prophylaxis:   Pharmacologic: Apixaban (Eliquis)  Mechanical VTE Prophylaxis in Place: Yes    Current Length of Stay: 10 day(s)    Current Patient Status: Inpatient     Code Status: Level 1 - Full Code      Subjective: Today I saw and examined the patient at bedside  Patient is improving  Still not at baseline but she is alert awake oriented x1  No aggression overnight    Objective:     Vitals:   Temp (24hrs), Av 4 °F (36 3 °C), Min:97 1 °F (36 2 °C), Max:97 6 °F (36 4 °C)    Temp:  [97 1 °F (36 2 °C)-97 6 °F (36 4 °C)] 97 5 °F (36 4 °C)  HR:  [59-72] 59  Resp:  [14-18] 18  BP: (115-138)/(74-87) 138/78  SpO2:  [94 %-97 %] 94 %  Body mass index is 25 58 kg/m²  Input and Output Summary (last 24 hours):     No intake or output data in the 24 hours ending 01/03/21 1151    Physical Exam:     Physical Exam  Constitutional:       Appearance: Normal appearance  HENT:      Head: Normocephalic and atraumatic  Nose: Nose normal       Mouth/Throat:      Mouth: Mucous membranes are moist    Eyes:      Conjunctiva/sclera: Conjunctivae normal    Neck:      Musculoskeletal: Normal range of motion and neck supple  Cardiovascular:      Rate and Rhythm: Normal rate and regular rhythm  Pulses: Normal pulses  Heart sounds: Normal heart sounds  Pulmonary:      Effort: Pulmonary effort is normal       Breath sounds: Normal breath sounds  Abdominal:      General: Bowel sounds are normal       Palpations: Abdomen is soft  Musculoskeletal: Normal range of motion  General: No swelling  Right lower leg: No edema  Left lower leg: No edema  Skin:     General: Skin is warm  Coloration: Skin is not pale  Findings: No erythema  Neurological:      General: No focal deficit present  Mental Status: She is alert  She is disoriented  Psychiatric:         Mood and Affect: Mood normal          Behavior: Behavior normal            Additional Data:     Labs:    Results from last 7 days   Lab Units 01/02/21  0437   WBC Thousand/uL 11 38*   HEMOGLOBIN g/dL 13 3   HEMATOCRIT % 41 8   PLATELETS Thousands/uL 414*   NEUTROS PCT % 85*   LYMPHS PCT % 9*   MONOS PCT % 5   EOS PCT % 0     Results from last 7 days   Lab Units 01/03/21  1054 01/02/21  0437   POTASSIUM mmol/L 4 0 4 7   CHLORIDE mmol/L 107 108   CO2 mmol/L 26 25   BUN mg/dL 29* 32*   CREATININE mg/dL 0 90 0 89   CALCIUM mg/dL 9 4 9 2   ALK PHOS U/L  --  57 5   ALT U/L  --  112*   AST U/L  --  38           * I Have Reviewed All Lab Data Listed Above  * Additional Pertinent Lab Tests Reviewed:  Yaquelin Kasper Admission Reviewed          Last 24 Hours Medication List:   Current Facility-Administered Medications   Medication Dose Route Frequency Provider Last Rate    acetaminophen  650 mg Oral Q6H PRN Isis Jimenez MD      aluminum-magnesium hydroxide-simethicone  30 mL Oral Q6H PRN Isis Jimenez MD      apixaban  2 5 mg Oral BID Isis Jimenez MD      bisacodyl  10 mg Rectal Daily PRN Isis Jimenez MD      cholecalciferol  2,000 Units Oral Daily Isis Jimenez MD      famotidine  20 mg Oral BID Isis Jimenez MD      losartan  25 mg Oral Daily Evert Murillo MD      mirtazapine  15 mg Oral HS Iveth Garcia MD      multivitamin-minerals  1 tablet Oral Daily Isis Jimenez MD      ondansetron  4 mg Intravenous Q6H PRN Isis Jimenez MD          Today, Patient Was Seen By: Isis Jimenez MD    ** Please Note: This note has been constructed using a voice recognition system   **

## 2021-01-04 LAB
ANION GAP SERPL CALCULATED.3IONS-SCNC: 7 MMOL/L (ref 4–13)
BUN SERPL-MCNC: 27 MG/DL (ref 6–20)
CALCIUM SERPL-MCNC: 9.1 MG/DL (ref 8.4–10.2)
CHLORIDE SERPL-SCNC: 111 MMOL/L (ref 96–108)
CO2 SERPL-SCNC: 28 MMOL/L (ref 22–33)
CREAT SERPL-MCNC: 0.95 MG/DL (ref 0.4–1.1)
GFR SERPL CREATININE-BSD FRML MDRD: 58 ML/MIN/1.73SQ M
GLUCOSE SERPL-MCNC: 93 MG/DL (ref 65–140)
POTASSIUM SERPL-SCNC: 4.2 MMOL/L (ref 3.5–5)
SODIUM SERPL-SCNC: 146 MMOL/L (ref 133–145)

## 2021-01-04 PROCEDURE — 99232 SBSQ HOSP IP/OBS MODERATE 35: CPT | Performed by: INTERNAL MEDICINE

## 2021-01-04 PROCEDURE — 80048 BASIC METABOLIC PNL TOTAL CA: CPT | Performed by: INTERNAL MEDICINE

## 2021-01-04 RX ORDER — DEXTROSE MONOHYDRATE 50 MG/ML
75 INJECTION, SOLUTION INTRAVENOUS CONTINUOUS
Status: DISCONTINUED | OUTPATIENT
Start: 2021-01-04 | End: 2021-01-05

## 2021-01-04 RX ADMIN — Medication 2000 UNITS: at 10:25

## 2021-01-04 RX ADMIN — APIXABAN 2.5 MG: 2.5 TABLET, FILM COATED ORAL at 10:25

## 2021-01-04 RX ADMIN — Medication 1 TABLET: at 10:25

## 2021-01-04 RX ADMIN — FAMOTIDINE 20 MG: 20 TABLET ORAL at 18:03

## 2021-01-04 RX ADMIN — LOSARTAN POTASSIUM 25 MG: 25 TABLET, FILM COATED ORAL at 10:25

## 2021-01-04 RX ADMIN — MIRTAZAPINE 15 MG: 15 TABLET, FILM COATED ORAL at 21:09

## 2021-01-04 RX ADMIN — APIXABAN 2.5 MG: 2.5 TABLET, FILM COATED ORAL at 18:03

## 2021-01-04 RX ADMIN — FAMOTIDINE 20 MG: 20 TABLET ORAL at 10:25

## 2021-01-04 RX ADMIN — DEXTROSE 75 ML/HR: 5 SOLUTION INTRAVENOUS at 13:54

## 2021-01-04 NOTE — PROGRESS NOTES
Patient examined spoke with the nurse  Patient is resting in the bed alert awake not lethargic she stares at me and responds inappropriately to the simple verbal command  Patient is disoriented she could not tell me her age she is severely demented and depressed she is improved and stable with delirium and psychotic agitated behavior  Nurse reports that she is taking her medications  Patient is still eating poorly  Patient is a nursing home resident  Patient is referred to the different nursing home at son's request because he is not happy with the nursing home where she came from  Once patient is medically stable patient will be discharged to the nursing home when the bed is available  Nurse reports that she is doing all right  No new behavior problem  No side effects of Remeron noted  Patient needs help with ADL care  Patient is not fully aware of her problems limitations and needs patient cannot take care of herself  Continue medications the same at this time and follow-up

## 2021-01-04 NOTE — ASSESSMENT & PLAN NOTE
Urinalysis is dirty  Patient was initially on ceftriaxone , then culture grew ESBL, was switched to meropenem  Today I talked to ID , Dr Prakash Gonsalves , he suggested to d/c antibiotics as patient is afebrile, no leucocytosis  Received 6 days of antibiotics  Likely colonization

## 2021-01-04 NOTE — CASE MANAGEMENT
Sw received call from [facility] admissions liaison advising that they have spoken with son and clarified miscommunication with MA coverage and the [facility]  They can offer bed tomorrow  Sw called son who is in agreement with plan  IMM reviewed with patient's caregiver  patient's caregiver agree with discharge determination    SW received call from [facility]  Emmett Lin requesting update on discharge plan  KENNETH advised of plan to admit to [facility] tomorrow  [facility] transport arranged with Micaela CABRERA for 1100   Medical necessity completed      Nursing advised of contact info for report/faxing AVS

## 2021-01-04 NOTE — ASSESSMENT & PLAN NOTE
Patient has baseline dementia  But alert awake oriented x3 at baseline  Patient answered her name, alert awake oriented  1  Psychiatry on board- Continue remeron 15 mg daily   Patient is not eating or drinking anything  She is failure to thrive  Waiting for placement, we are in communication with son, patient may need PEG tube  Continue dementia care

## 2021-01-04 NOTE — PROGRESS NOTES
Progress Note - Onelia Samuels 1940, 68 y o  female MRN: [id number]    Unit/Bed#: -01 Encounter: [id number]    Primary Care Provider: No primary care provider on file  Date and time admitted to hospital: 12/24/2020 12:01 PM        Alzheimer's disease [facility])  Assessment & Plan  Patient has baseline dementia  But alert awake oriented x3 at baseline  Patient answered her name, alert awake oriented  1  Psychiatry on board- Continue remeron 15 mg daily   Patient is not eating or drinking anything  She is failure to thrive  Waiting for placement, we are in communication with son, patient may need PEG tube  Continue dementia care  Hypernatremia  Assessment & Plan  Sodium was 152 on admission likely secondary to poor oral intake  Hypernatremia get resolved min patient is on D5 water  Today patient sodium again trended up to 146  Will start on D5 water at 75 cc an hour  Trend BMP tomorrow    * Pneumonia due to COVID-19 virus  Assessment & Plan  Patient was found COVID positive on December 14, 2020  She was started with Decadron and multivitamins at nursing home  Her conditions got worse, she was found to have low oxygen saturation  Nursing home staff told that patient is not eating or drinking anything for couple of days  Chest x-ray suggestive of pneumonia  Patient completed antibiotic course  Continue anticoagulation, steroids, multivitamins as per protocol  Plasma given, remdesivir was held due to kidney injury  Self Proning  Patient is on room air, chest clear to auscultation  Continue to monitor closely     Patient stable medically but needs placement  CM on board  UTI (urinary tract infection)resolved as of 1/2/2021  Assessment & Plan  Urinalysis is dirty  Patient was initially on ceftriaxone , then culture grew ESBL, was switched to meropenem  Today I talked to ID , Dr Mirian Taylor , he suggested to d/c antibiotics as patient is afebrile, no leucocytosis   Received 6 days of antibiotics  Likely colonization  LARA (acute kidney injury) (HCC)resolved as of 12/31/2020  Assessment & Plan  Creatinine on admission on 4 08, likely prerenal because of inadequate p o  Intake as reported by the nursing home  Improved  to 1  Creatinine is normal now      Acute hypoxic respiratory failureresolved as of 12/30/2020  Assessment & Plan  COVID 19 on Dec 14, 2020  Patient got diagnosed with COVID-19 on December 14, 2020  After that she was started on multivitamins and Decadron in [facility]  Patient got worse over time  Patient was in acute hypoxic respiratory failure secondary to COVID-19 pneumonia  Currently she is saturating well on room air    Rhabdomyolysisresolved as of 12/29/2020  Assessment & Plan  CK level is trending down  CK Level trended down   Encourage free water intake  COVID-19  Assessment & Plan  See above    Elevated d-dimer  Assessment & Plan  Patient had elevated D-dimer, was on heparin 5000 q 8 hours  Venous duplex negative for DVT , was transition to eliquis 2 5 BID per covid protocol    Metabolic encephalopathy  Assessment & Plan  Patient had hypernatremia, acute kidney injury, leading to metabolic encephalopathy  At baseline patient is alert awake oriented x3     CT of the head is negative  Improving  Hypertension  Assessment & Plan  Patient has history of hypertension, losartan was on hold due to kidney injury, creatinine is better now  We will resume losartan 25 mg daily         VTE Pharmacologic Prophylaxis:   Pharmacologic: Apixaban (Eliquis)  Mechanical VTE Prophylaxis in Place: Yes    Current Length of Stay: 11 day(s)    Current Patient Status: Inpatient     Code Status: Level 1 - Full Code      Subjective: Today I saw and examined the patient at bedside  Patient was sleeping, on waking her up she was disoriented  She was able to tell her name but not more than that  No acute event occurred overnight    Objective:     Vitals: Temp (24hrs), Av 1 °F (36 2 °C), Min:96 8 °F (36 °C), Max:97 4 °F (36 3 °C)    Temp:  [96 8 °F (36 °C)-97 4 °F (36 3 °C)] 96 8 °F (36 °C)  HR:  [87-97] 87  Resp:  [16-18] 16  BP: (114-129)/(42-84) 114/42  SpO2:  [91 %-96 %] 91 %  Body mass index is 26 22 kg/m²  Input and Output Summary (last 24 hours): Intake/Output Summary (Last 24 hours) at 2021 1309  Last data filed at 2021 1101  Gross per 24 hour   Intake 540 ml   Output    Net 540 ml       Physical Exam:     Physical Exam  Constitutional:       Appearance: Normal appearance  HENT:      Head: Normocephalic and atraumatic  Nose: Nose normal       Mouth/Throat:      Mouth: Mucous membranes are moist    Eyes:      Conjunctiva/sclera: Conjunctivae normal    Neck:      Musculoskeletal: Normal range of motion and neck supple  Cardiovascular:      Rate and Rhythm: Normal rate and regular rhythm  Pulses: Normal pulses  Heart sounds: Normal heart sounds  Pulmonary:      Effort: Pulmonary effort is normal       Breath sounds: Normal breath sounds  Abdominal:      General: Bowel sounds are normal       Palpations: Abdomen is soft  Musculoskeletal: Normal range of motion  General: No swelling  Right lower leg: No edema  Left lower leg: No edema  Skin:     General: Skin is warm  Coloration: Skin is not pale  Findings: No erythema  Neurological:      General: No focal deficit present  Mental Status: She is alert  She is disoriented     Psychiatric:         Mood and Affect: Mood normal          Behavior: Behavior normal          Additional Data:     Labs:    Results from last 7 days   Lab Units 21  0437   WBC Thousand/uL 11 38*   HEMOGLOBIN g/dL 13 3   HEMATOCRIT % 41 8   PLATELETS Thousands/uL 414*   NEUTROS PCT % 85*   LYMPHS PCT % 9*   MONOS PCT % 5   EOS PCT % 0     Results from last 7 days   Lab Units 21  0601  21  0437   POTASSIUM mmol/L 4 2   < > 4 7   CHLORIDE mmol/L 111*   < > 108   CO2 mmol/L 28   < > 25   BUN mg/dL 27*   < > 32*   CREATININE mg/dL 0 95   < > 0 89   CALCIUM mg/dL 9 1   < > 9 2   ALK PHOS U/L  --   --  57 5   ALT U/L  --   --  112*   AST U/L  --   --  38    < > = values in this interval not displayed  * I Have Reviewed All Lab Data Listed Above  * Additional Pertinent Lab Tests Reviewed: Yaquelin 66 Admission Reviewed    Recent Cultures (last 7 days):           Last 24 Hours Medication List:   Current Facility-Administered Medications   Medication Dose Route Frequency Provider Last Rate    acetaminophen  650 mg Oral Q6H PRN Ed Kim MD      aluminum-magnesium hydroxide-simethicone  30 mL Oral Q6H PRN Ed Kim MD      apixaban  2 5 mg Oral BID Ed Kim MD      bisacodyl  10 mg Rectal Daily PRN Ed Kim MD      cholecalciferol  2,000 Units Oral Daily Ed Kim MD      dextrose  75 mL/hr Intravenous Continuous Ed Kim MD      famotidine  20 mg Oral BID Ed Kim MD      losartan  25 mg Oral Daily Ty Gosselin, MD      mirtazapine  15 mg Oral HS Starla Katz MD      multivitamin-minerals  1 tablet Oral Daily Ed Kim MD      ondansetron  4 mg Intravenous Q6H PRN Ed Kim MD          Today, Patient Was Seen By: Ed Kim MD    ** Please Note: This note has been constructed using a voice recognition system   **

## 2021-01-04 NOTE — ASSESSMENT & PLAN NOTE
Sodium was 152 on admission likely secondary to poor oral intake  Hypernatremia get resolved min patient is on D5 water  Today patient sodium again trended up to 146  Will start on D5 water at 75 cc an hour    Trend BMP tomorrow

## 2021-01-04 NOTE — ASSESSMENT & PLAN NOTE
COVID 19 on Dec 14, 2020  Patient got diagnosed with COVID-19 on December 14, 2020  After that she was [facility] and [facility]  Patient got worse over time      Patient was in acute hypoxic respiratory failure secondary to COVID-19 pneumonia  Currently she is saturating well on room air

## 2021-01-05 VITALS
RESPIRATION RATE: 18 BRPM | OXYGEN SATURATION: 94 % | WEIGHT: 166.45 LBS | TEMPERATURE: 97.7 F | HEART RATE: 81 BPM | DIASTOLIC BLOOD PRESSURE: 74 MMHG | HEIGHT: 66 IN | BODY MASS INDEX: 26.75 KG/M2 | SYSTOLIC BLOOD PRESSURE: 124 MMHG

## 2021-01-05 PROBLEM — E87.0 HYPERNATREMIA: Status: RESOLVED | Noted: 2020-12-24 | Resolved: 2021-01-05

## 2021-01-05 LAB
ANION GAP SERPL CALCULATED.3IONS-SCNC: 5 MMOL/L (ref 4–13)
BUN SERPL-MCNC: 21 MG/DL (ref 6–20)
CALCIUM SERPL-MCNC: 9 MG/DL (ref 8.4–10.2)
CHLORIDE SERPL-SCNC: 104 MMOL/L (ref 96–108)
CO2 SERPL-SCNC: 28 MMOL/L (ref 22–33)
CREAT SERPL-MCNC: 0.85 MG/DL (ref 0.4–1.1)
GFR SERPL CREATININE-BSD FRML MDRD: 66 ML/MIN/1.73SQ M
GLUCOSE SERPL-MCNC: 111 MG/DL (ref 65–140)
POTASSIUM SERPL-SCNC: 4 MMOL/L (ref 3.5–5)
SODIUM SERPL-SCNC: 137 MMOL/L (ref 133–145)

## 2021-01-05 PROCEDURE — 99239 HOSP IP/OBS DSCHRG MGMT >30: CPT | Performed by: INTERNAL MEDICINE

## 2021-01-05 PROCEDURE — 80048 BASIC METABOLIC PNL TOTAL CA: CPT | Performed by: INTERNAL MEDICINE

## 2021-01-05 RX ORDER — MIRTAZAPINE 15 MG/1
15 TABLET, FILM COATED ORAL
Qty: 30 TABLET | Refills: 0
Start: 2021-01-05 | End: 2021-05-25

## 2021-01-05 RX ORDER — ACETAMINOPHEN 325 MG/1
650 TABLET ORAL EVERY 6 HOURS PRN
Qty: 30 TABLET | Refills: 0
Start: 2021-01-05

## 2021-01-05 RX ORDER — LOSARTAN POTASSIUM 25 MG/1
25 TABLET ORAL DAILY
Qty: 30 TABLET | Refills: 0
Start: 2021-01-05 | End: 2021-05-25

## 2021-01-05 RX ORDER — FAMOTIDINE 20 MG/1
20 TABLET, FILM COATED ORAL DAILY
Qty: 30 TABLET | Refills: 0
Start: 2021-01-05

## 2021-01-05 RX ADMIN — DEXTROSE 75 ML/HR: 5 SOLUTION INTRAVENOUS at 05:53

## 2021-01-05 RX ADMIN — Medication 1 TABLET: at 11:01

## 2021-01-05 RX ADMIN — FAMOTIDINE 20 MG: 20 TABLET ORAL at 11:00

## 2021-01-05 RX ADMIN — LOSARTAN POTASSIUM 25 MG: 25 TABLET, FILM COATED ORAL at 11:00

## 2021-01-05 RX ADMIN — Medication 2000 UNITS: at 11:00

## 2021-01-05 RX ADMIN — APIXABAN 2.5 MG: 2.5 TABLET, FILM COATED ORAL at 11:01

## 2021-01-05 NOTE — ASSESSMENT & PLAN NOTE
Patient has baseline dementia  But alert awake oriented x3 at baseline  Patient answered her name, alert awake oriented  1  Psychiatry on board- Continue remeron 15 mg daily   Patient is not eating or drinking anything  She is failure to thrive  Family refused for PEG tube  Son want care to be continued at this point in the nursing home  Patient would be transferred to nursing home today  Continue dementia care

## 2021-01-05 NOTE — DISCHARGE INSTR - AVS FIRST PAGE
Patient has worsening dementia  Encourage food and water intake as much as possible  Eliquis for 10 days for covid protocol  Follow-up with primary care doctor

## 2021-01-05 NOTE — PROGRESS NOTES
Patient examined spoke with the nurse 's notes reviewed and noted  Patient is accepted at [facility] and she supposed to be transfer later today  Patient is suffering from severe dementia with depression and she was delirious with the behavior problem but she has been stable with agitation and psychosis patient is suffering from dementia with depression and poor appetite she is tolerating her medication Remeron 15 mg HS  Patient needs help with ADL care  Patient is not lethargic she stares at me and does not respond to simple verbal command  No meaningful communications  Nurse reports no behavior problem  No evidence of psychosis no hallucination no agitation patient is not suicidal   No side effects of medication noted  Nurse reports that she is doing all right she is stable at her baseline  Patient will be transfer to the nursing home  Psychiatric follow-up recommended after the discharge

## 2021-01-05 NOTE — NURSING NOTE
PT d/c to [facility]  Report Called to [facility]  Midline removed   AVS, face sheet, and medical necessity given to transport team

## 2021-01-05 NOTE — DISCHARGE SUMMARY
Discharge- Wilfredo Blanco 1940, 68 y o  female MRN: [id number]    Unit/Bed#: -01 Encounter: [id number]    Primary Care Provider: No primary care provider on file  Date and time admitted to hospital: 12/24/2020 12:01 PM        Alzheimer's disease [facility])  Assessment & Plan  Patient has baseline dementia  But alert awake oriented x3 at baseline  Patient answered her name, alert awake oriented  1  Psychiatry on board- Continue remeron 15 mg daily   Patient is not eating or drinking anything  She is failure to thrive  Family refused for PEG tube  Son want care to be continued at this point in the nursing home  Patient would be transferred to nursing home today  Continue dementia care  Hypernatremiaresolved as of 1/5/2021  Assessment & Plan  Sodium was 152 on admission likely secondary to poor oral intake  Resolved  Patient is at high risk of going back and hyponatremia as she does not eat or drink  * Pneumonia due to COVID-19 virus  Assessment & Plan  Patient was found COVID positive on December 14, 2020  She was started with Decadron and multivitamins at nursing home  Her conditions got worse, she was found to have low oxygen saturation  Nursing home staff told that patient is not eating or drinking anything for couple of days  Chest x-ray suggestive of pneumonia  Patient completed antibiotic course  Continue anticoagulation, steroids, multivitamins as per protocol  Plasma given, remdesivir was held due to kidney injury  Self Proning  Patient is on room air, chest clear to auscultation  Continue to monitor closely     Patient stable medically but needs placement  CM on board  UTI (urinary tract infection)resolved as of 1/2/2021  Assessment & Plan  Urinalysis is dirty  Patient was initially on ceftriaxone , then culture grew ESBL, was switched to meropenem   Today I talked to ID , Dr Finesse Cuevas , he suggested to d/c antibiotics as patient is afebrile, no leucocytosis  Received 6 days of antibiotics  Likely colonization  LARA (acute kidney injury) (HCC)resolved as of 12/31/2020  Assessment & Plan  Creatinine on admission on 4 08, likely prerenal because of inadequate p o  Intake as reported by the nursing home  Improved  to 1  Creatinine is normal now      Acute hypoxic respiratory failureresolved as of 12/30/2020  Assessment & Plan  COVID 19 on Dec 14, 2020  Patient got diagnosed with COVID-19 on December 14, 2020  After that she was started on multivitamins and Decadron in [facility]  Patient got worse over time  Patient was in acute hypoxic respiratory failure secondary to COVID-19 pneumonia  Currently she is saturating well on room air    Rhabdomyolysisresolved as of 12/29/2020  Assessment & Plan  CK level is trending down  CK Level trended down   Encourage free water intake  COVID-19  Assessment & Plan  See above    Elevated d-dimer  Assessment & Plan  Patient had elevated D-dimer, was on heparin 5000 q 8 hours  Venous duplex negative for DVT , was transition to eliquis 2 5 BID per covid protocol    Metabolic encephalopathy  Assessment & Plan  Patient had hypernatremia, acute kidney injury, leading to metabolic encephalopathy  At baseline patient is alert awake oriented x3     CT of the head is negative  Improving  Hypertension  Assessment & Plan  Patient has history of hypertension, losartan was on hold due to kidney injury, creatinine is better now  We will resume losartan 25 mg daily         Discharging Resident Physician: Hetal Simmons MD  Attending: Santana Romero MD  PCP: No primary care provider on file    Admission Date: 12/24/2020  Discharge Date: 01/05/21    Disposition:     Discharge to nursing home    Reason for Admission:  Acute metabolic encephalopathy, LARA, COVID-19 pneumonia and acute hypoxic respiratory failure, hypernatremia    Consultations During Hospital Stay:  Nephrology, Psychiatry      Hospital Course: Liz Russo is a 68 y o  female patient who originally presented to the hospital on 12/24/2020 due to altered mental status  It was found that patient has, LARA, ESBL UTI, rhabdomyolysis, hypernatremia, COVID-19 positive and pneumonia and acute hypoxic respiratory failure from the COVID-19  She was treated with COVID-19 moderate pathway, she received convulsant plasma, antibiotics, steroids, Remsdisivir was not given as patient had LARA  Marleta Press She was given IV fluids  Over days patient improved  Hypoxic respiratory failure was resolved  LARA and optimized has also resolved  Nephrology was consulted  Patient was treated with D5 water  Patient does not eat or drink, and that is the reason for frequent hyponatremia  Psychiatry was consulted because patient had altered mental status I had initially she was requiring restraints  Later patient improved and is off from restraints  Son was updated every day regarding [facility] health  At this point patient son does not want PEG tube  He want to continue the care in nursing home  On discharge we are giving Eliquis 2 5 according to the COVID protocol to complete 30 days of anticoagulation after COVID infection  Patient already completed the antibiotic course for ESBL UTI and pneumonia  Id was consulted, patient received 2 days of meropenem but later on discontinued as per ID recommendation because ESBL is mainly colonization  Today I saw and examined the patient at bedside patient is clinically and hemodynamically stable for discharge to nursing home  Condition at Discharge: good     Discharge Day Visit / Exam:     Subjective: Today I saw and examined the patient bedside  Patient is improving  She was able to tell her name  She denied any complaints    Vitals: Blood Pressure: 124/74 (01/05/21 0813)  Pulse: 81 (01/05/21 0813)  Temperature: 97 7 °F (36 5 °C) (01/05/21 0813)  Temp Source: Tympanic (01/05/21 0813)  Respirations: 18 (01/05/21 0813)  Height: 5' 6" (167 6 cm) (12/24/20 2300)  Weight - Scale: 75 5 kg (166 lb 7 2 oz) (01/05/21 0559)  SpO2: 94 % (01/05/21 0813)  Exam:     Physical Exam  Constitutional:       Appearance: Normal appearance  HENT:      Head: Normocephalic and atraumatic  Nose: Nose normal       Mouth/Throat:      Mouth: Mucous membranes are moist    Eyes:      Conjunctiva/sclera: Conjunctivae normal    Neck:      Musculoskeletal: Normal range of motion and neck supple  Cardiovascular:      Rate and Rhythm: Normal rate and regular rhythm  Pulses: Normal pulses  Heart sounds: Normal heart sounds  Pulmonary:      Effort: Pulmonary effort is normal       Breath sounds: Normal breath sounds  Abdominal:      General: Bowel sounds are normal       Palpations: Abdomen is soft  Musculoskeletal: Normal range of motion  General: No swelling  Right lower leg: No edema  Skin:     General: Skin is warm  Coloration: Skin is not pale  Findings: No erythema  Neurological:      General: No focal deficit present  Mental Status: She is alert  Comments: Alert awake oriented x2   Psychiatric:         Mood and Affect: Mood normal          Behavior: Behavior normal                Discussion with Family: We communicated with son every day  Son do not want PEG tube and want to continue the care for patient at this point  Discharge instructions/Information to patient and family:   See after visit summary for information provided to patient and family  Provisions for Follow-Up Care:  See after visit summary for information related to follow-up care and any pertinent home health orders  Discharge Medications:  See after visit summary for reconciled discharge medications provided to patient and family        ** Please Note: This note has been constructed using a voice recognition system **

## 2021-01-05 NOTE — ASSESSMENT & PLAN NOTE
COVID 19 on Dec 14, 2020  Patient got diagnosed with COVID-19 on December 14, 2020  After that she was started on multivitamins and Decadron in [facility]  Patient got worse over time      Patient was in acute hypoxic respiratory failure secondary to COVID-19 pneumonia  Currently she is saturating well on room air

## 2021-01-05 NOTE — ASSESSMENT & PLAN NOTE
Sodium was 152 on admission likely secondary to poor oral intake  Resolved  Patient is at high risk of going back and hyponatremia as she does not eat or drink

## 2021-01-05 NOTE — PLAN OF CARE
Problem: Potential for Falls  Goal: Patient will remain free of falls  Description: INTERVENTIONS:  - Assess patient frequently for physical needs  -  Identify cognitive and physical deficits and behaviors that affect risk of falls  -  [place] fall precautions as indicated by assessment   - Educate patient/family on patient safety including physical limitations  - Instruct patient to call for assistance with activity based on assessment  - Consider OT/PT consult to assist with strengthening/mobility  Outcome: Progressing     Problem: Prexisting or High Potential for Compromised Skin Integrity  Goal: Skin integrity is maintained or improved  Description: INTERVENTIONS:  - Identify patients at risk for skin breakdown  - Assess and monitor skin integrity  - Assess and monitor nutrition and hydration status  - Monitor labs   - Assess for incontinence   - Turn and reposition patient  - Assist with mobility/ambulation  - Relieve pressure over bony prominences  - Avoid friction and shearing  - Provide appropriate hygiene as needed including keeping skin clean and dry  - Evaluate need for skin moisturizer/barrier cream  - Collaborate with interdisciplinary team   - Patient/family teaching  - Consider wound care consult   Outcome: Progressing     Problem: Nutrition/Hydration-ADULT  Goal: Nutrient/Hydration intake appropriate for improving, restoring or maintaining nutritional needs  Description: Monitor and assess patient's nutrition/hydration status for malnutrition  Collaborate with interdisciplinary team and initiate plan and interventions as ordered  Monitor patient's weight and dietary intake as ordered or per policy  Utilize nutrition screening tool and intervene as necessary  Determine patient's food preferences and provide high-protein, high-caloric foods as appropriate       INTERVENTIONS:  - Monitor oral intake, urinary output, labs, and treatment plans  - Assess nutrition and hydration status and recommend course of action  - Evaluate amount of meals eaten  - Assist patient with eating if necessary   - Allow adequate time for meals  - Recommend/ encourage appropriate diets, oral nutritional supplements, and vitamin/mineral supplements  - Order, calculate, and assess calorie counts as needed  - Recommend, monitor, and adjust tube feedings and TPN/PPN based on assessed needs  - Assess need for intravenous fluids  - Provide specific nutrition/hydration education as appropriate  - Include patient/family/caregiver in decisions related to nutrition  Outcome: Progressing

## 2021-01-05 NOTE — ASSESSMENT & PLAN NOTE
Urinalysis is dirty  Patient was initially on ceftriaxone , then culture grew ESBL, was switched to meropenem  Today I talked to ID , Dr Juan Pablo Urena , he suggested to d/c antibiotics as patient is afebrile, no leucocytosis  Received 6 days of antibiotics  Likely colonization

## 2021-01-05 NOTE — DISCHARGE INSTRUCTIONS
COVID-19 (Coronavirus Disease 2019)   WHAT YOU NEED TO KNOW:   COVID-19 is the disease caused by the novel (new) coronavirus first discovered in December 2019  Coronaviruses generally cause upper respiratory (nose, throat, and lung) infections, such as a cold  The new virus can also cause serious lower respiratory conditions, such as pneumonia or acute respiratory distress syndrome (ARDS)  Anyone can develop serious problems from the new virus, but your risk is higher if you are 65 or older  A weak immune system, diabetes, or a heart or lung condition can also increase your risk  DISCHARGE INSTRUCTIONS:   If you think you or someone you know may be infected:  Do the following to protect others:  · If emergency care is needed,  tell the  about the possible infection, or call ahead and tell the emergency department  · Call a healthcare provider  for instructions if symptoms are mild  Anyone who may be infected should not  arrive without calling first  The provider will need to protect staff members and other patients  · The person who may be infected needs to wear a face covering  while getting medical care  This will help lower the risk of infecting others  Coverings are not used for anyone who is younger than 2 years, has breathing problems, or cannot remove it  The provider can give you instructions for anyone who cannot wear a covering  Call your local emergency number (911 in the [address] [facility]) or go to the emergency department if:   · You have trouble breathing or shortness of breath at rest     · You have chest pain or pressure that lasts longer than 5 minutes  · You become confused or hard to wake  · Your lips or face are blue  · You have a fever of 104°F (40°C) or higher  Call your doctor if:   · You do not  have symptoms of COVID-19 but had close physical contact within 14 days with someone who tested positive      · You have questions or concerns about your condition or care     Medicines: You may need any of the following:  · Decongestants  help reduce nasal congestion and help you breathe more easily  If you take decongestant pills, they may make you feel restless or cause problems with your sleep  Do not use decongestant sprays for more than a few days  · Cough suppressants  help reduce coughing  Ask your healthcare provider which type of cough medicine is best for you  · Acetaminophen  decreases pain and fever  It is available without a doctor's order  Ask how much to take and how often to take it  Follow directions  Read the labels of all other medicines you are using to see if they also contain acetaminophen, or ask your doctor or pharmacist  Acetaminophen can cause liver damage if not taken correctly  Do not use more than 4 grams (4,000 milligrams) total of acetaminophen in one day  · NSAIDs , such as ibuprofen, help decrease swelling, pain, and fever  NSAIDs can cause stomach bleeding or kidney problems in certain people  If you take blood thinner medicine, always ask your healthcare provider if NSAIDs are safe for you  Always read the medicine label and follow directions  · Take your medicine as directed  Contact your healthcare provider if you think your medicine is not helping or if you have side effects  Tell him or her if you are allergic to any medicine  Keep a list of the medicines, vitamins, and herbs you take  Include the amounts, and when and why you take them  Bring the list or the pill bottles to follow-up visits  Carry your medicine list with you in case of an emergency  How the 2019 coronavirus spreads: The virus spreads quickly and easily  You can become infected if you are in contact with a large amount of the virus, even for a short time  You can also become infected by being around a small amount of virus for a long time   The following are ways the virus is thought to spread, but more information may be coming:  · Droplets are the most common way all coronaviruses spread  The virus can travel in droplets that form when a person talks, coughs, or sneezes  Anyone who breathes in the droplets or gets them in his or her eyes can become infected with the virus  Close personal contact with an infected person is thought to be the main way the virus spreads  Close personal contact means you are within 6 feet (2 meters) of the person  · Person-to-person contact can spread the virus  For example, a person with the virus on his or her hands can spread it by shaking hands with someone  At this time, it does not appear that the virus can be passed to a baby during pregnancy or delivery  The baby can be infected after he or she is born through person-to-person contact  The virus also does not appear to spread in breast milk  If you are pregnant or breastfeeding, talk to your healthcare provider or obstetrician about any concerns you have  · The virus can stay on objects and surfaces  A person can get the virus on his or her hands by touching the object or surface  Infection happens if the person then touches his or her eyes or mouth with unwashed hands  It is not yet known how long the virus can stay on an object or surface  That is why it is important to clean all surfaces that are used regularly  · An infected animal may be able to infect a person who touches it  This may happen at live markets or on a farm  How everyone can lower the risk for COVID-19:  The best way to prevent infection is to avoid anyone who is infected, but this can be hard to do  An infected person can spread the virus before signs or symptoms begin, or even if signs or symptoms never develop  The following can help lower the risk for infection:      · Wash your hands often throughout the day  Use soap and water  Rub your soapy hands together, lacing your fingers  Wash the front and back of each hand, and in between your fingers   Use the fingers of one hand to scrub under the fingernails of the other hand  Wash for at least 20 seconds  Rinse with warm, running water for several seconds  Then dry your hands with a clean towel or paper towel  Use hand  that contains alcohol if soap and water are not available  Do not touch your eyes, nose, or mouth without washing your hands first  Teach children how to wash their hands and use hand   · Cover a sneeze or cough  This prevents droplets from traveling from you to others  Turn your face away and cover your mouth and nose with a tissue  Throw the tissue away  Use the bend of your arm if a tissue is not available  Then wash your hands well with soap and water or use hand   Turn and cover your face if you are around someone who is sneezing or coughing  Teach children how to cover a cough or sneeze  · Follow worldwide, national, and local social distancing guidelines  Social distancing means people avoid close physical contact so the virus cannot spread from one person to another  Keep at least 6 feet (2 meters) between you and others  Also keep this distance from anyone who comes to your home, such as someone making a delivery  · Make a habit of not touching your face  It is not known how long the virus can stay on objects and surfaces  If you get the virus on your hands, you can transfer it to your eyes, nose, or mouth and become infected  You can also transfer it to objects, surfaces, or people  Be aware of what you touch when you go out  Examples include handrails and elevator buttons  Try not to touch anything with bare hands unless it is necessary  Wash your hands before you leave your home and when you return  · Clean and disinfect high-touch surfaces and objects often  Use a disinfecting solution or wipes  You can make a solution by diluting 4 teaspoons of bleach in 1 quart (4 cups) of water   Clean and disinfect even if you think no one living in or coming to your home is infected with the virus  You can wipe items with a disinfecting cloth before you bring them into your home  Wash your hands after you handle anything you bring into your home  · Make your immune system as healthy as possible  A weakened immune system makes you more vulnerable to the new coronavirus  No COVID-19 vaccine is available yet  Vaccines such as the flu and pneumonia vaccines can help your immune system  Your healthcare provider can tell you which vaccines to get, and when to get them  Keep your immune system as strong as possible  Do not smoke  Eat healthy foods, exercise regularly, and try to manage stress  Go to bed and wake up at the same times each day  Social distancing guidelines:  National and local social distancing rules vary  Rules may change over time as restrictions are lifted  Restrictions may return if an outbreak happens where you live  It is important to know and follow all current social distancing rules in your area  The following are general guidelines:  · Limit trips out of your home  You may be able to have food, medicines, and other supplies delivered  If possible, have delivered items left at your door or other area  Try not to have someone hand you an item  You will be so close to the person that the virus can spread between you  · Do not have close physical contact with anyone who does not live in your home  Do not shake hands with, hug, or kiss a person as a greeting  Stand or walk as far from others as possible  If you must use public transportation (such as a bus or subway), try to sit or stand away from others  You can stay safely connected with others through phone calls, e-mail messages, social media websites, and video chats  Check in on anyone who may be having a hard time socially distancing, or who lives alone  Ask administrators at nursing homes or long-term care facilities how you can safely communicate with someone living there      · Wear a cloth face covering around others who do not live in your home  Face coverings help prevent the virus from spreading to others in droplets  You can use a clear face covering if someone needs to read your lips  This is a cloth covering that has plastic over the mouth area so your lips can be seen  Do not use coverings that have breathing valves or vents  The virus can travel out of the valve or vent and be spread to others  Do not take your covering off to talk, cough, or sneeze  Do not use coverings on children younger than 2 years or on anyone who has breathing problems or cannot remove it  · Only allow medical or other necessary professionals into your home  Wear your face covering, and remind professionals to wear a face covering  Remind them to wash their hands when they arrive and before they leave  Do not  let anyone who does not live in your home in, even if the person is not sick  A person can pass the virus to others before symptoms of COVID-19 begin  Some people never even develop symptoms  Children commonly have mild symptoms or no symptoms  It may be hard to tell a child not to hug or kiss you  Explain that this is how he or she can help you stay healthy  · Do not go to someone else's home unless it is necessary  Do not go over to visit, even if the person is lonely  Only go if you need to help him or her  Make sure you both wear face coverings while you are there  · Avoid large gatherings and crowds  Gatherings or crowds of 10 or more individuals can cause the virus to spread  Examples of gatherings include parties, sporting events, [employer] services, and conferences  Crowds may form at beaches, [place], and tourist attractions  Protect yourself by staying away from large gatherings and crowds  · Ask your healthcare provider for other ways to have appointments  You may be able to have appointments without having to go into the provider's office  Some providers offer phone, video, or other types of appointments  You may also be able to get prescriptions for a few months of your medicines at a time  · Stay safe if you must go out to work  You may have a job that can only be done outside your home  Keep physical distance between you and other workers as much as possible  Follow your employer's rules so everyone stays safe  If you have COVID-19 and are recovering at home:  Healthcare providers will give you specific instructions to follow  The following are general guidelines to remind you how to keep others safe until you are well:  · Wash your hands often  Use soap and water as much as possible  You can use hand  that contains alcohol if soap and water are not available  Do not share towels with anyone  If you use paper towels, throw them away in a lined trash can kept in your room or area  Use a covered trash can, if possible  · Do not go out of your home unless it is necessary  You may have to go to your healthcare provider's office for check-ups or to get prescription refills  Do not arrive at the provider's office without an appointment  Providers have to make their offices safe for staff and other patients  · Do not have close physical contact with anyone unless it is necessary  Only have close physical contact with a person giving direct care, or a baby or child you must care for  Family members and friends should not visit you  If possible, stay in a separate area or room of your home if you live with others  No one should go into the area or room except to give you care  You can visit with others by phone, video chat, e-mail, or similar systems  It is important to stay connected with others in your life while you recover  · Wear a face covering while others are near you  This can help prevent droplets from spreading the virus when you talk, sneeze, or cough  Put the covering on before anyone comes into your room or area   Remind the person to cover his or her nose and mouth before going in to provide care for you  · Do not share items  Do not share dishes, towels, or other items with anyone  Items need to be washed after you use them  · Protect your baby  Wash your hands with soap and water often throughout the day  Wear a clean face covering while you breastfeed, or while you express or pump breast milk  If possible, ask someone who is well to care for your baby  You can put breast milk in bottles for the person to use, if needed  Talk to your healthcare provider if you have any questions or concerns about caring for or bonding with your baby  He or she will tell you when to bring your baby in for check-ups and vaccines  He or she will also tell you what to do if you think your baby was infected with the new virus  · Do not handle live animals  Until more is known, it is best not to touch, play with, or handle live animals  Some animals, including pets, have been infected with the new coronavirus  Do not handle or care for animals until you are well  Care includes feeding, petting, and cuddling your pet  Do not let your pet lick you or share your food  Ask someone who is not infected to take care of your pet, if possible  If you must care for a pet, wear a face covering  Wash your hands before and after you give care  · Follow directions from your healthcare provider for being around others after you recover  You will need to wait at least 10 days after symptoms first appeared  Then you will need to have no fever for 24 hours without fever medicine, and no other symptoms  A loss of taste or smell may continue for several months  It is considered okay to be around others if this is your only symptom  It is not known for sure if or for how long a recovered person can pass the virus to others  Your provider may give you instructions, such as continuing social distancing or wearing a face covering around others      How to take care of someone who has COVID-19:  If the person lives in another home, arrange for a time to give care  Remember to bring a few pairs of disposable gloves and a cloth face covering  The following are general guidelines to help you safely care for anyone who has COVID-19:  · Wash your hands often  Wash before and after you go into the person's home, area, or room  Throw paper towels away in a lined trash can that has a lid, if possible  · Do not allow others to go near the person  No one should come into the person's home unless it is necessary  If possible, the person should be in a separate area or room if he or she lives with others  Keep the room's door shut unless you need to go in or out  Have others call, video chat, or e-mail the person if he or she is feeling well enough  The person may feel lonely if he or she is kept separate for a long period of time  Safe communication can help him or her stay connected to family and friends  · Make sure the person's room has good air flow  You may be able to open the window if the weather allows  An air conditioner can also be turned on to help air move  · Contact the person before you go in to give care  Make sure the person is wearing a face covering  Remind him or her to wash his or her hands with soap and water  He or she can use hand  that contains alcohol if soap and water are not available  Put on a face covering before you go in to give care  · Wear gloves while you give care and clean  Clean items the person uses often  Clean countertops, cooking surfaces, and the fronts and insides of the microwave and refrigerator  Clean the shower, toilet, the area around the toilet, the sink, the area around the sink, and faucets  Gather used laundry or bedding  Wash and dry items on the warmest settings the fabric allows  Wash dishes and silverware in hot, soapy water or in a   · Anything you throw away needs to go into a lined trash can    When you need to empty the trash, close the open end of the lining and tie it closed  This helps prevent items the virus is on from spilling out of the trash  Remove your gloves and throw them away  Wash your hands  Follow up with your doctor as directed:  Write down your questions so you remember to ask them during your visits  For more information:   · Centers for Disease Control and Prevention  [address] , [address]  Phone: 3- 104 - 319-6258  Web Address: [employer] com br    © Copyright [address] Information is for End User's use only and may not be sold, redistributed or otherwise used for commercial purposes  All illustrations and images included in CareNotes® are the copyrighted property of A D A M , Inc  or [facility]   The above information is an  only  It is not intended as medical advice for individual conditions or treatments  Talk to your doctor, nurse or pharmacist before following any medical regimen to see if it is safe and effective for you  Apixaban (By mouth)   Apixaban (a-PIX-a-ban)  Treats and prevents blood clots  This medicine is a blood thinner  Brand Name(s): Eliquis, Eliquis 30 Day DVT/PE Starter Pack   There may be other brand names for this medicine  When This Medicine Should Not Be Used: This medicine is not right for everyone  Do not use it if you had an allergic reaction to apixaban or you have active bleeding  How to Use This Medicine:   Tablet  · Your doctor will tell you how much medicine to use  Do not use more than directed  · If you are not able to swallow the tablets whole, they may be crushed and mixed in water, 5% dextrose in water (D5W), apple juice, or applesauce  The crushed tablets may be mixed with 60 mL of water or D5W dose and given through a nasogastric tube (NGT)  · This medicine should come with a Medication Guide  Ask your pharmacist for a copy if you do not have one  · Missed dose: Take a dose as soon as you remember   If it is almost time for your next dose, wait until then and take a regular dose  Do not take extra medicine to make up for a missed dose  · Store the medicine in a closed container at room temperature, away from heat, moisture, and direct light  Drugs and Foods to Avoid:   Ask your doctor or pharmacist before using any other medicine, including over-the-counter medicines, vitamins, and herbal products  · Some medicines can affect how apixaban works  Tell your doctor if you are using any of the following:   ? Carbamazepine, itraconazole, ketoconazole, phenytoin, rifampin, ritonavir, Naomi's wort  ? Blood thinner (including clopidogrel, heparin, prasugrel, warfarin)  ? Medicine to treat depression  ? NSAID pain or arthritis medicine (including aspirin, celecoxib, diclofenac, ibuprofen, naproxen)  Warnings While Using This Medicine:   · Tell your doctor if you are pregnant or breastfeeding, or if you have kidney disease, liver disease, bleeding problems, antiphospholipid syndrome, or an artificial heart valve  · Do not stop using this medicine suddenly without asking your doctor  You might have a higher risk of stroke for a short time after you stop using this medicine  · This medicine increases your risk for bleeding that can become serious if not controlled  You may also bruise easily, and it may take longer than usual for bleeding to stop  · This medicine may increase your risk for a hematoma (blood clot) in your spine or back if you undergo an epidural or spinal puncture  This could lead to paralysis  Tell your doctor if you ever had spine problems or back surgery  · Tell any doctor or dentist who treats you that you are using this medicine  With your doctor's supervision, you may need to stop using this medicine several days before you have surgery or medical tests  · Your doctor will do lab tests at regular visits to check on the effects of this medicine  Keep all appointments  · Keep all medicine out of the reach of children  Never share your medicine with anyone  Possible Side Effects While Using This Medicine:   Call your doctor right away if you notice any of these side effects:  · Allergic reaction: Itching or hives, swelling in your face or hands, swelling or tingling in your mouth or throat, chest tightness, trouble breathing  · Change in how much or how often you urinate, red or pink urine  · Chest pain, trouble breathing  · Coughing up blood, vomiting blood or material that looks like coffee grounds  · Numbness, tingling, or muscle weakness in your legs or feet  · Red or black, tarry stools  · Unusual bleeding, bruising, or weakness  If you notice other side effects that you think are caused by this medicine, tell your doctor  Call your doctor for medical advice about side effects  You may report side effects to FDA at 7-281-FDA-8742  © Copyright [address] [place] [address] Drive Information is for End User's use only and may not be sold, redistributed or otherwise used for commercial purposes  The above information is an  only  It is not intended as medical advice for individual conditions or treatments  Talk to your doctor, nurse or pharmacist before following any medical regimen to see if it is safe and effective for you  Famotidine (By mouth)   Famotidine ([id number])  Treats ulcers, gastroesophageal reflux disease (GERD), and conditions that cause the stomach to produce too much acid  Also treats heartburn caused by acid indigestion  Brand Name(s): Acid Controller, Acid Reducer, [facility] Acid Reducer, Good Sense Acid Reducer, Heartburn Relief, Leader Acid Reducer, Pepcid, Pepcid AC, Quality Choice Acid Controller, Sunmark Acid Reducer, TopCare Acid Reducer   There may be other brand names for this medicine  When This Medicine Should Not Be Used: This medicine is not right for everyone  Do not use it if you had an allergic reaction to famotidine or similar medicines    How to Use This Detail Level: Zone Medicine:   Liquid, Tablet, Chewable Tablet, Dissolving Tablet  · Take your medicine as directed  Your dose may need to be changed several times to find what works best for you  · Follow the instructions on the medicine label if you are using this medicine without a prescription  · The chewable tablet must be chewed completely before you swallow it  · If you are using the disintegrating tablet, make sure your hands are dry before you handle it  Do not open the blister pack that contains the tablet until you are ready to take it  Remove the tablet from the blister pack by peeling back the foil, then taking the tablet out  Do not push the tablet through the foil  Place the tablet in your mouth  It should melt within 2 minutes  Swallow after the tablet has melted  · Shake the oral liquid for 5 to 10 seconds before each use  Measure the medicine with a marked measuring spoon or medicine cup  · Missed dose: Take a dose as soon as you remember  If it is almost time for your next dose, wait until then and take a regular dose  Do not take extra medicine to make up for a missed dose  · Store the medicine in a closed container at room temperature, away from heat, moisture, and direct light  Do not freeze the oral liquid  Throw away any unused oral liquid after 1 month  Drugs and Foods to Avoid:   Ask your doctor or pharmacist before using any other medicine, including over-the-counter medicines, vitamins, and herbal products  · Some medicines can affect how famotidine works  Tell your doctor if you are using cefditoren, dasatinib, delavirdine, fosamprenavir, or tizanidine  Warnings While Using This Medicine:   · Tell your doctor if you are pregnant or breastfeeding, or if you have kidney disease, liver disease, or stomach cancer  · This medicine may cause changes in your heart rhythm (including QT prolongation)  · Some brands or forms of this medicine may contain phenylalanine (aspartame)   If you have phenylketonuria (PKU), talk to your doctor before using this medicine  · Call your doctor if your symptoms do not improve or if they get worse  · Your doctor will do lab tests at regular visits to check on the effects of this medicine  Keep all appointments  · Keep all medicine out of the reach of children  Never share your medicine with anyone  Possible Side Effects While Using This Medicine:   Call your doctor right away if you notice any of these side effects:  · Allergic reaction: Itching or hives, swelling in your face or hands, swelling or tingling in your mouth or throat, chest tightness, trouble breathing  · Agitation, confusion, seizures  · Blistering, peeling, red skin rash  · Dark urine or pale stools, yellow eyes or skin  · Fainting, dizziness, lightheadedness  · Fast, pounding, or uneven heartbeat  · Fever, chills, cough, sore throat, body aches  · Unusual bleeding, bruising, or weakness  If you notice these less serious side effects, talk with your doctor:   · Constipation, diarrhea, upset stomach  · Headache  · Nausea, vomiting  If you notice other side effects that you think are caused by this medicine, tell your doctor  Call your doctor for medical advice about side effects  You may report side effects to FDA at 0-565-FDA-9047  © Copyright [facility] [address] Information is for End User's use only and may not be sold, redistributed or otherwise used for commercial purposes  The above information is an  only  It is not intended as medical advice for individual conditions or treatments  Talk to your doctor, nurse or pharmacist before following any medical regimen to see if it is safe and effective for you

## 2021-01-20 ENCOUNTER — TELEPHONE (OUTPATIENT)
Dept: NEPHROLOGY | Facility: CLINIC | Age: 81
End: 2021-01-20

## 2021-01-20 NOTE — TELEPHONE ENCOUNTER
We received a message for the patient to be schedule for a hospital follow up appointment  Patient was discharged on 1-5  However patient was transferred from the [facility] to [place] recently so they held off from scheduling any appointment  I spoke to yunier and they stated that they are currently only doing virtual appointment  The previous note did stated that virtual appointments many not be a good option due to the patient being nonverbal  Would you like me to schedule a virtual appointment or wait until she can come in the office? Yunier did stated it would have to be at least 3 weeks until she can come in

## 2021-02-05 NOTE — PROGRESS NOTES
Virtual Regular Visit      Assessment/Plan:    Problem List Items Addressed This Visit        Respiratory    Pneumonia due to COVID-19 virus       Cardiovascular and Mediastinum    Hypertension       Nervous and Auditory    Alzheimer's disease ([facility] [address] ) - Primary    Metabolic encephalopathy    Relevant Orders    Basic metabolic panel       Other    RESOLVED: Hypernatremia    Relevant Orders    Basic metabolic panel      Other Visit Diagnoses     Acute kidney injury ([facility] [address] )        Relevant Orders    Basic metabolic panel               Reason for visit is   Chief Complaint   Patient presents with    Virtual Regular Visit     hospital followup/LARA    Virtual Regular Visit        Encounter provider HILDA Forrest    Provider located at [address] [place] [address] [place] [id number]      Recent Visits  No visits were found meeting these conditions  Showing recent visits within past 7 days and meeting all other requirements     Today's Visits  Date Type Provider Dept   02/08/21 Telemedicine HILDA Forrest [facility]   Showing today's visits and meeting all other requirements     Future Appointments  No visits were found meeting these conditions  Showing future appointments within next 150 days and meeting all other requirements        The patient was identified by name and date of birth  Danuta Cardenas was informed that this is a telemedicine visit and that the visit is being conducted through telephone  My office door was closed  No one else was in the room  She acknowledged consent and understanding of privacy and security of the video platform  The patient has agreed to participate and understands they can discontinue the visit at any time  Patient is aware this is a billable service  Subjective  Danuta Cardenas is a 68 y o  female who is being seen for follow-up    She was hospitalized at [facility] due to COVID-19 pneumonia, acute kidney injury, ESBL UTI and rhabdomyolysis  and discharged on 01/05/202  She has a past medical history of Alzheimer's disease and hypertension  She has recurrent hypernatremia due to poor oral intake in the setting of dementia  She is currently residing at the [address] [place] [address]  I was able to speak with Abdon Espinal over the telephone  I was also able to speak with her nurse  According to the staff where she is currently residing she is eating and drinking well at this time  She was sitting and watching TV with friends when I phoned for the visit  When I spoke with Abdon Espinal she basically tells me that everything is fine and she feels great  I encouraged her to make sure that she eats and drinks well and she assured me that everything was fine  Unable to locate follow-up labs since discharge  Assessment and plan:    1  Recent acute kidney injury:    · Hospitalized with COVID-19 pneumonia and rhabdomyolysis with UTI  · Nephrology consulted for management of acute kidney injury with creatinine up to 4 2 on admission  · Renal function improved and creatinine was down to 0 85 at discharge  · Baseline unclear- unable to locate prior blood work  · Workup:  Urinalysis showed 1+ protein, numerous WBCs, 10-20 RBCs  · Etiology of LARA suspected to be related to volume depletion/prerenal azotemia/possibility of ATN in the setting of COVID-19 sepsis and autoregulatory failure due to ARB  Rhabdomyolysis also in the differential although CK 1600    2  Hypernatremia:    · Recurrent in the setting of dementia/poor oral intake  · Sodium level 154 on admission down to 141 at discharge  · Per hospitalist note:  son refusing PEG tube  · Per staff at nursing facility patient is eating and drinking well  · No follow-up blood work has been obtained as yet  3  Primary hypertension:  Losartan held during hospitalization  4  Alzheimer's dementia: At baseline  5   Urinary tract infection:  Urine culture positive for ESBL E coli during hospitalization  Denies urinary complaints  6  COVID-19 pneumonia:  Patient had acute respiratory failure and was discharged on oxygen therapy  Plan/recommendations: Will check BMP now and every 6 weeks  Follow-up in the Nephrology Clinic in approximately 3 months  With Dr Radha Bradshaw     Past Medical History:   Diagnosis Date    [facility]'s disease ([facility] [address] )     Hypertension     UTI (urinary tract infection) 12/25/2020       History reviewed  No pertinent surgical history  Current Outpatient Medications   Medication Sig Dispense Refill    acetaminophen (TYLENOL) 325 mg tablet Take 2 tablets (650 mg total) by mouth every 6 (six) hours as needed for mild pain 30 tablet 0    apixaban (ELIQUIS) 2 5 mg Take 1 tablet (2 5 mg total) by mouth 2 (two) times a day 20 tablet 0    famotidine (PEPCID) 20 mg tablet Take 1 tablet (20 mg total) by mouth daily 30 tablet 0    losartan (COZAAR) 25 mg tablet Take 1 tablet (25 mg total) by mouth daily 30 tablet 0    mirtazapine (REMERON) 15 mg tablet Take 1 tablet (15 mg total) by mouth daily at bedtime 30 tablet 0     No current facility-administered medications for this visit  No Known Allergies    Review of Systems   Reason unable to perform ROS: Full review of systems was not completed due to underlying dementia  Constitutional: Negative for activity change and chills  HENT: Negative for congestion  Respiratory: Negative for cough and shortness of breath  Cardiovascular: Negative for chest pain, palpitations and leg swelling  Gastrointestinal: Negative for abdominal pain  Genitourinary: Negative for difficulty urinating  Musculoskeletal: Negative for arthralgias and back pain  Neurological: Negative for dizziness, weakness and headaches  Video Exam    There were no vitals filed for this visit  Physical Exam  Exam conducted with a chaperone present  Constitutional:       General: She is not in acute distress    HENT: Nose: No congestion  Mouth/Throat:      Comments: No hoarseness, voice clear  Pulmonary:      Effort: Pulmonary effort is normal  No respiratory distress  Breath sounds: No stridor  No wheezing  Comments: Able to speak in complete sentences  No conversational dyspnea  Abdominal:      General: There is no distension  Musculoskeletal:      Right lower leg: No edema  Left lower leg: No edema  Skin:     Findings: No rash  Neurological:      General: No focal deficit present  Mental Status: She is alert  Psychiatric:         Mood and Affect: Mood normal           I spent 30 minutes directly with the patient during this visit   It was my intent to perform this visit via video technology but the patient was not able to do a video connection so the visit was completed via audio telephone only  VIRTUAL VISIT DISCLAIMER    Anastasiya Sotelo acknowledges that she has consented to an online visit or consultation  She understands that the online visit is based solely on information provided by her, and that, in the absence of a face-to-face physical evaluation by the physician, the diagnosis she receives is both limited and provisional in terms of accuracy and completeness  This is not intended to replace a full medical face-to-face evaluation by the physician  Anastasiya Sotelo understands and accepts these terms

## 2021-02-08 ENCOUNTER — TELEMEDICINE (OUTPATIENT)
Dept: NEPHROLOGY | Facility: CLINIC | Age: 81
End: 2021-02-08
Payer: MEDICARE

## 2021-02-08 DIAGNOSIS — N17.9 ACUTE KIDNEY INJURY (HCC): ICD-10-CM

## 2021-02-08 DIAGNOSIS — U07.1 COVID-19: ICD-10-CM

## 2021-02-08 DIAGNOSIS — E87.0 HYPERNATREMIA: ICD-10-CM

## 2021-02-08 DIAGNOSIS — I10 ESSENTIAL HYPERTENSION: ICD-10-CM

## 2021-02-08 DIAGNOSIS — U07.1 PNEUMONIA DUE TO COVID-19 VIRUS: ICD-10-CM

## 2021-02-08 DIAGNOSIS — G93.41 METABOLIC ENCEPHALOPATHY: ICD-10-CM

## 2021-02-08 DIAGNOSIS — G30.1 LATE ONSET ALZHEIMER'S DISEASE WITHOUT BEHAVIORAL DISTURBANCE (HCC): Primary | ICD-10-CM

## 2021-02-08 DIAGNOSIS — J12.82 PNEUMONIA DUE TO COVID-19 VIRUS: ICD-10-CM

## 2021-02-08 DIAGNOSIS — F02.80 LATE ONSET ALZHEIMER'S DISEASE WITHOUT BEHAVIORAL DISTURBANCE (HCC): Primary | ICD-10-CM

## 2021-02-08 PROCEDURE — 99214 OFFICE O/P EST MOD 30 MIN: CPT | Performed by: NURSE PRACTITIONER

## 2021-02-08 NOTE — PATIENT INSTRUCTIONS
1   Please have blood work done now and every 6 weeks x3 to monitor renal function and check sodium level  2  Please make sure patient is drinking adequate amounts of water to prevent elevated sodium level  3  No change to current medications  Please call us with any questions or concerns  4  Follow-up in approximately 3 months with Dr Flores Maurice   5  Further adjustments in plan of care will be made depending on blood work results    6   Avoid the use of NSAIDs such as Motrin, Aleve, ibuprofen and Advil

## 2021-05-20 ENCOUNTER — DOCUMENTATION (OUTPATIENT)
Dept: NEPHROLOGY | Facility: CLINIC | Age: 81
End: 2021-05-20

## 2021-05-20 LAB
EGFR AFRICAN AMERICAN (HISTORICAL): 78
EXT GLUCOSE BLD: 96
EXTERNAL ANION GAP: 7
EXTERNAL BUN: 12
EXTERNAL CALCIUM: 8.9
EXTERNAL CHLORIDE: 114
EXTERNAL CO2: 25
EXTERNAL CREATININE: 0.84
EXTERNAL POTASSIUM: 4.5
EXTERNAL SODIUM: 146
GFR SERPL CREATININE-BSD FRML MDRD: 67 ML/MIN/{1.73_M2}

## 2021-05-25 ENCOUNTER — OFFICE VISIT (OUTPATIENT)
Dept: NEPHROLOGY | Facility: CLINIC | Age: 81
End: 2021-05-25
Payer: MEDICARE

## 2021-05-25 VITALS
HEIGHT: 66 IN | HEART RATE: 65 BPM | SYSTOLIC BLOOD PRESSURE: 190 MMHG | DIASTOLIC BLOOD PRESSURE: 100 MMHG | WEIGHT: 182 LBS | BODY MASS INDEX: 29.25 KG/M2

## 2021-05-25 DIAGNOSIS — I10 ESSENTIAL HYPERTENSION: Primary | ICD-10-CM

## 2021-05-25 DIAGNOSIS — E87.0 HYPERNATREMIA: ICD-10-CM

## 2021-05-25 PROCEDURE — 99214 OFFICE O/P EST MOD 30 MIN: CPT | Performed by: INTERNAL MEDICINE

## 2021-05-25 RX ORDER — LISINOPRIL 5 MG/1
5 TABLET ORAL DAILY
COMMUNITY
End: 2021-05-25

## 2021-05-25 RX ORDER — AMLODIPINE BESYLATE 10 MG/1
10 TABLET ORAL DAILY
Qty: 90 TABLET | Refills: 3 | Status: SHIPPED | OUTPATIENT
Start: 2021-05-25

## 2021-05-25 RX ORDER — LOSARTAN POTASSIUM 100 MG/1
100 TABLET ORAL DAILY
COMMUNITY

## 2021-05-25 RX ORDER — OMEPRAZOLE 20 MG/1
20 CAPSULE, DELAYED RELEASE ORAL DAILY
COMMUNITY

## 2021-05-25 RX ORDER — LOPERAMIDE HYDROCHLORIDE 2 MG/1
2 CAPSULE ORAL 2 TIMES DAILY PRN
COMMUNITY

## 2021-05-25 NOTE — PATIENT INSTRUCTIONS
Primary Hypertension:   -Current medication:  Losartan 100 mg daily and lisinopril 5 mg daily   -Current blood pressure:  Elevated to systolic 935Q to 489Q  -Plan:    ·  Patient is currently both on ACE inhibitor as well as ARB  Will continue losartan 100 mg daily but stop lisinopril 5 mg daily  Started on amlodipine 10 mg daily instead of lisinopril  · Recommend monitoring of blood pressure twice daily at nursing facility  Goal blood pressure less than 140/80 considering her age  · Follow-up in 2 months with an advanced practitioner  · Check upc ratio before the next office visit  -Recommend 2 g sodium diet  -Recommend daily exercise and weight loss  -Discussed home monitoring of BP and maintaining a log of blood pressure   -Recommend goal BP less than 130/80  Hypernatremia  - likely due to poor oral intake  - had hypernatremia during hospital admission in December 2020 requiring D5 water  - last sodium level still elevated at 146, stressed on oral hydration and increasing free water intake  - will repeat BMP in 1 month to monitor sodium level    Alzheimer's dementia  -continue follow-up and management per PCP  H/o LARA in Dec 2020 in the setting of COVID-19 infection and poor oral intake: resolved  Renal function now stable at cr 0 8  Last blood work from May 20, 2021 showed creatinine 0 8, EGFR 67  Renal function now stable

## 2021-05-25 NOTE — PROGRESS NOTES
NEPHROLOGY OUTPATIENT PROGRESS NOTE   Heraclio Diaz 80 y o  female MRN: [id number]  DATE: 5/25/2021  Reason for visit:   Chief Complaint   Patient presents with    Follow-up     LARA/hypernatremia       ASSESSMENT and PLAN:  Primary Hypertension:   -Current medication:  Losartan 100 mg daily and lisinopril 5 mg daily   -Current blood pressure:  Elevated to systolic 219R to 417U  -Plan:    ·  Patient is currently both on ACE inhibitor as well as ARB  Will continue losartan 100 mg daily but stop lisinopril 5 mg daily  Started on amlodipine 10 mg daily instead of lisinopril  · Recommend monitoring of blood pressure twice daily at nursing  Goal blood pressure less than 140/80 considering her age  · Follow-up in 2 months with an advanced practitioner  · Check upc ratio before the next office visit  -Recommend 2 g sodium diet  -Recommend daily exercise and weight loss  -Discussed home monitoring of BP and maintaining a log of blood pressure   -Recommend goal BP less than 130/80  Hypernatremia  - likely due to poor oral intake  - had hypernatremia during hospital admission in December 2020 requiring D5 water  - last sodium level still elevated at 146, stressed on oral hydration and increasing free water intake  - will repeat BMP in 1 month to monitor sodium level    Alzheimer's dementia  -continue follow-up and management per PCP  H/o LARA in Dec 2020 in the setting of COVID-19 infection and poor oral intake: resolved  Renal function now stable at cr 0 8  Last blood work from May 20, 2021 showed creatinine 0 8, EGFR 67  Renal function now stable  Patient Instructions   Primary Hypertension:   -Current medication:  Losartan 100 mg daily and lisinopril 5 mg daily   -Current blood pressure:  Elevated to systolic 584R to 728I  -Plan:    ·  Patient is currently both on ACE inhibitor as well as ARB  Will continue losartan 100 mg daily but stop lisinopril 5 mg daily    Started on amlodipine 10 mg daily instead of lisinopril  · Recommend monitoring of blood pressure twice daily at nursing facility  Goal blood pressure less than 140/80 considering her age  · Follow-up in 2 months with an advanced practitioner  · Check upc ratio before the next office visit  -Recommend 2 g sodium diet  -Recommend daily exercise and weight loss  -Discussed home monitoring of BP and maintaining a log of blood pressure   -Recommend goal BP less than 130/80  Hypernatremia  - likely due to poor oral intake  - had hypernatremia during hospital admission in December 2020 requiring D5 water  - last sodium level still elevated at 146, stressed on oral hydration and increasing free water intake  - will repeat BMP in 1 month to monitor sodium level    Alzheimer's dementia  -continue follow-up and management per PCP  H/o LARA in Dec 2020 in the setting of COVID-19 infection and poor oral intake: resolved  Renal function now stable at cr 0 8  Last blood work from May 20, 2021 showed creatinine 0 8, EGFR 67  Renal function now stable  Diagnoses and all orders for this visit:    Essential hypertension  -     amLODIPine (NORVASC) 10 mg tablet; Take 1 tablet (10 mg total) by mouth daily  -     Basic metabolic panel; Future  -     Basic metabolic panel; Future  -     Protein / creatinine ratio, urine; Future  -     Urinalysis with microscopic; Future    Hypernatremia  -     Basic metabolic panel; Future  -     Basic metabolic panel; Future    Other orders  -     Discontinue: lisinopril (ZESTRIL) 5 mg tablet; Take 5 mg by mouth daily  -     loperamide (IMODIUM) 2 mg capsule; Take 2 mg by mouth 2 (two) times a day as needed for diarrhea  -     omeprazole (PriLOSEC) 20 mg delayed release capsule; Take 20 mg by mouth daily  -     losartan (COZAAR) 100 MG tablet;  Take 100 mg by mouth daily            SUBJECTIVE / HPI:  Jack Castro is a 80 y o   female with history of hypertension was seen at [facility] in December 2020 when she was admitted with COVID-19 infection  She had acute kidney injury and hypernatremia at that time, admission creatinine was 4 2 likely due to ATN/volume depletion in the setting of COVID-19 sepsis and rhabdomyolysis with CK level elevated to 1600  Renal function improved to creatinine 1 07 at the time of discharge  She had follow-up with advanced practitioner in February 2021 and on follow-up blood work renal function had improved to creatinine 0 8     last blood work from May 20, 2021 from [facility]  was removed, renal function is stable at creatinine 0 8 with sodium slightly elevated at 146  EGFR 67    Pt residing at [address] [place] [address] home     No new complaints  Not able to provide much information due to dementia    REVIEW OF SYSTEMS:    Review of Systems   Constitutional: Negative for chills and fever  HENT: Negative for ear pain and sore throat  Eyes: Negative for pain and visual disturbance  Respiratory: Negative for cough and shortness of breath  Cardiovascular: Negative for chest pain and palpitations  Gastrointestinal: Negative for abdominal pain and vomiting  Genitourinary: Negative for dysuria and hematuria  Musculoskeletal: Negative for arthralgias and back pain  Skin: Negative for color change and rash  Neurological: Negative for seizures and syncope  All other systems reviewed and are negative  More than 10 point review of systems were obtained and discussed in length with the patient  Complete review of systems were negative / unremarkable except mentioned above  PAST MEDICAL HISTORY:  Past Medical History:   Diagnosis Date    [facility]'s disease ([facility] [address] )     Hypertension     UTI (urinary tract infection) 12/25/2020       PAST SURGICAL HISTORY:  History reviewed  No pertinent surgical history      SOCIAL HISTORY:  Social History     Substance and Sexual Activity   Alcohol Use Not Currently     Social History     Substance and Sexual Activity   Drug Use Not Currently     Social History     Tobacco Use   Smoking Status Never Smoker   Smokeless Tobacco Never Used       FAMILY HISTORY:  History reviewed  No pertinent family history  MEDICATIONS:    Current Outpatient Medications:     acetaminophen (TYLENOL) 325 mg tablet, Take 2 tablets (650 mg total) by mouth every 6 (six) hours as needed for mild pain, Disp: 30 tablet, Rfl: 0    famotidine (PEPCID) 20 mg tablet, Take 1 tablet (20 mg total) by mouth daily, Disp: 30 tablet, Rfl: 0    loperamide (IMODIUM) 2 mg capsule, Take 2 mg by mouth 2 (two) times a day as needed for diarrhea, Disp: , Rfl:     losartan (COZAAR) 100 MG tablet, Take 100 mg by mouth daily, Disp: , Rfl:     omeprazole (PriLOSEC) 20 mg delayed release capsule, Take 20 mg by mouth daily, Disp: , Rfl:     amLODIPine (NORVASC) 10 mg tablet, Take 1 tablet (10 mg total) by mouth daily, Disp: 90 tablet, Rfl: 3    apixaban (ELIQUIS) 2 5 mg, Take 1 tablet (2 5 mg total) by mouth 2 (two) times a day (Patient not taking: Reported on 5/25/2021), Disp: 20 tablet, Rfl: 0      PHYSICAL EXAM:  Vitals:    05/25/21 1017 05/25/21 1039   BP: (!) 180/102 (!) 190/100   BP Location: Left arm    Patient Position: Sitting    Cuff Size: Adult    Pulse: 65    Weight: 82 6 kg (182 lb)    Height: 5' 6" (1 676 m)      Body mass index is 29 38 kg/m²  Physical Exam  Constitutional:       General: She is not in acute distress  Appearance: Normal appearance  She is well-developed  HENT:      Head: Normocephalic and atraumatic  Nose: Nose normal       Mouth/Throat:      Mouth: Mucous membranes are moist    Eyes:      General: No scleral icterus  Conjunctiva/sclera: Conjunctivae normal       Pupils: Pupils are equal, round, and reactive to light  Neck:      Musculoskeletal: Neck supple  Thyroid: No thyromegaly  Vascular: No JVD  Cardiovascular:      Rate and Rhythm: Normal rate and regular rhythm  Heart sounds: Normal heart sounds  No murmur   No friction rub  Pulmonary:      Effort: Pulmonary effort is normal  No respiratory distress  Breath sounds: Normal breath sounds  No wheezing or rales  Abdominal:      General: Bowel sounds are normal  There is no distension  Palpations: Abdomen is soft  Tenderness: There is no abdominal tenderness  Musculoskeletal:         General: No deformity  Right lower leg: No edema  Left lower leg: No edema  Skin:     General: Skin is warm and dry  Findings: No rash  Neurological:      Mental Status: She is alert  She is disoriented  Comments: Oriented x1   Psychiatric:         Mood and Affect: Mood normal          Behavior: Behavior normal          Thought Content: Thought content normal          Lab Results:   Results for orders placed or performed in visit on 22/98/13   Basic metabolic panel   Result Value Ref Range    SODIUM 146     POTASSIUM 4 5     CHLORIDE 114     CO2 25     BUN 12     CREATININE 0 84     Glucose 96     EXTERNAL CALCIUM 8 9     ANION GAP 7     eGFR Non-Afr   American 67     eGFR  78      Lab Results:   Results from last 7 days   Lab Units 05/20/21   POTASSIUM  4 5   CHLORIDE  114   CO2  25   BUN  12   CREATININE  0 84   CALCIUM  8 9       Laboratory Results:  Lab Results   Component Value Date    WBC 11 38 (H) 01/02/2021    HGB 13 3 01/02/2021    HCT 41 8 01/02/2021    MCV 86 01/02/2021     (H) 01/02/2021     Lab Results   Component Value Date    SODIUM 146 05/20/2021    K 4 5 05/20/2021     05/20/2021    CO2 25 05/20/2021    BUN 12 05/20/2021    CREATININE 0 84 05/20/2021    GLUC 96 05/20/2021    CALCIUM 8 9 05/20/2021     Lab Results   Component Value Date    CALCIUM 8 9 05/20/2021     No results found for: LABPROT  [unfilled]  Lab Results   Component Value Date    CALCIUM 8 9 05/20/2021     [unfilled]

## 2021-05-25 NOTE — LETTER
May 25, 2021     Isabella Records, [id number] [id number]    Patient: Kimberly Cleveland   YOB: 1940   Date of Visit: 5/25/2021       Dear Dr Leopold Cuna:    Thank you for referring Kimberly Cleveland to me for evaluation  Below are my notes for this consultation  If you have questions, please do not hesitate to call me  I look forward to following your patient along with you  Sincerely,        Minetta Romberg, MD        CC: No Recipients  Minetta Romberg, MD  5/25/2021 10:50 AM  Sign when Signing Visit  [address]   Kimberly Cleveland 80 y o  female MRN: [id number]  DATE: 5/25/2021  Reason for visit:   Chief Complaint   Patient presents with    Follow-up     LARA/hypernatremia       ASSESSMENT and PLAN:  Primary Hypertension:   -Current medication:  Losartan 100 mg daily and lisinopril 5 mg daily   -Current blood pressure:  Elevated to systolic 823V to 688R  -Plan:    ·  Patient is currently both on ACE inhibitor as well as ARB  Will continue losartan 100 mg daily but stop lisinopril 5 mg daily  Started on amlodipine 10 mg daily instead of lisinopril  · Recommend monitoring of blood pressure twice daily at nursing  Goal blood pressure less than 140/80 considering her age  · Follow-up in 2 months with an advanced practitioner  · Check upc ratio before the next office visit  -Recommend 2 g sodium diet  -Recommend daily exercise and weight loss  -Discussed home monitoring of BP and maintaining a log of blood pressure   -Recommend goal BP less than 130/80  Hypernatremia  - likely due to poor oral intake  - had hypernatremia during hospital admission in December 2020 requiring D5 water  - last sodium level still elevated at 146, stressed on oral hydration and increasing free water intake  - will repeat BMP in 1 month to monitor sodium level    Alzheimer's dementia  -continue follow-up and management per PCP      H/o LARA in Dec 2020 in the setting of COVID-19 infection and poor oral intake: resolved  Renal function now stable at cr 0 8  Last blood work from May 20, 2021 showed creatinine 0 8, EGFR 67  Renal function now stable  Patient Instructions   Primary Hypertension:   -Current medication:  Losartan 100 mg daily and lisinopril 5 mg daily   -Current blood pressure:  Elevated to systolic 578X to 288N  -Plan:    ·  Patient is currently both on ACE inhibitor as well as ARB  Will continue losartan 100 mg daily but stop lisinopril 5 mg daily  Started on amlodipine 10 mg daily instead of lisinopril  · Recommend monitoring of blood pressure twice daily at nursing facility  Goal blood pressure less than 140/80 considering her age  · Follow-up in 2 months with an advanced practitioner  · Check upc ratio before the next office visit  -Recommend 2 g sodium diet  -Recommend daily exercise and weight loss  -Discussed home monitoring of BP and maintaining a log of blood pressure   -Recommend goal BP less than 130/80  Hypernatremia  - likely due to poor oral intake  - had hypernatremia during hospital admission in December 2020 requiring D5 water  - last sodium level still elevated at 146, stressed on oral hydration and increasing free water intake  - will repeat BMP in 1 month to monitor sodium level    Alzheimer's dementia  -continue follow-up and management per PCP  H/o LARA in Dec 2020 in the setting of COVID-19 infection and poor oral intake: resolved  Renal function now stable at cr 0 8  Last blood work from May 20, 2021 showed creatinine 0 8, EGFR 67  Renal function now stable  Diagnoses and all orders for this visit:    Essential hypertension  -     amLODIPine (NORVASC) 10 mg tablet; Take 1 tablet (10 mg total) by mouth daily  -     Basic metabolic panel; Future  -     Basic metabolic panel; Future  -     Protein / creatinine ratio, urine; Future  -     Urinalysis with microscopic;  Future    Hypernatremia  -     Basic metabolic panel; Future  -     Basic metabolic panel; Future    Other orders  -     Discontinue: lisinopril (ZESTRIL) 5 mg tablet; Take 5 mg by mouth daily  -     loperamide (IMODIUM) 2 mg capsule; Take 2 mg by mouth 2 (two) times a day as needed for diarrhea  -     omeprazole (PriLOSEC) 20 mg delayed release capsule; Take 20 mg by mouth daily  -     losartan (COZAAR) 100 MG tablet; Take 100 mg by mouth daily            SUBJECTIVE / HPI:  Fer Ma is a 80 y o   female with history of hypertension was seen at [facility] in December 2020 when she was admitted with COVID-19 infection  She had acute kidney injury and hypernatremia at that time, admission creatinine was 4 2 likely due to ATN/volume depletion in the setting of COVID-19 sepsis and rhabdomyolysis with CK level elevated to 1600  Renal function improved to creatinine 1 07 at the time of discharge  She had follow-up with advanced practitioner in February 2021 and on follow-up blood work renal function had improved to creatinine 0 8     last blood work from May 20, 2021 from [facility]  91  was removed, renal function is stable at creatinine 0 8 with sodium slightly elevated at 146  EGFR 67    Pt residing at [address] [place] [address] home     No new complaints  Not able to provide much information due to dementia    REVIEW OF SYSTEMS:    Review of Systems   Constitutional: Negative for chills and fever  HENT: Negative for ear pain and sore throat  Eyes: Negative for pain and visual disturbance  Respiratory: Negative for cough and shortness of breath  Cardiovascular: Negative for chest pain and palpitations  Gastrointestinal: Negative for abdominal pain and vomiting  Genitourinary: Negative for dysuria and hematuria  Musculoskeletal: Negative for arthralgias and back pain  Skin: Negative for color change and rash  Neurological: Negative for seizures and syncope  All other systems reviewed and are negative        More than 10 point review of systems were obtained and discussed in length with the patient  Complete review of systems were negative / unremarkable except mentioned above  PAST MEDICAL HISTORY:  Past Medical History:   Diagnosis Date    Alzheimer's disease ([address] )     Hypertension     UTI (urinary tract infection) 12/25/2020       PAST SURGICAL HISTORY:  History reviewed  No pertinent surgical history  SOCIAL HISTORY:  Social History     Substance and Sexual Activity   Alcohol Use Not Currently     Social History     Substance and Sexual Activity   Drug Use Not Currently     Social History     Tobacco Use   Smoking Status Never Smoker   Smokeless Tobacco Never Used       FAMILY HISTORY:  History reviewed  No pertinent family history  MEDICATIONS:    Current Outpatient Medications:     acetaminophen (TYLENOL) 325 mg tablet, Take 2 tablets (650 mg total) by mouth every 6 (six) hours as needed for mild pain, Disp: 30 tablet, Rfl: 0    famotidine (PEPCID) 20 mg tablet, Take 1 tablet (20 mg total) by mouth daily, Disp: 30 tablet, Rfl: 0    loperamide (IMODIUM) 2 mg capsule, Take 2 mg by mouth 2 (two) times a day as needed for diarrhea, Disp: , Rfl:     losartan (COZAAR) 100 MG tablet, Take 100 mg by mouth daily, Disp: , Rfl:     omeprazole (PriLOSEC) 20 mg delayed release capsule, Take 20 mg by mouth daily, Disp: , Rfl:     amLODIPine (NORVASC) 10 mg tablet, Take 1 tablet (10 mg total) by mouth daily, Disp: 90 tablet, Rfl: 3    apixaban (ELIQUIS) 2 5 mg, Take 1 tablet (2 5 mg total) by mouth 2 (two) times a day (Patient not taking: Reported on 5/25/2021), Disp: 20 tablet, Rfl: 0      PHYSICAL EXAM:  Vitals:    05/25/21 1017 05/25/21 1039   BP: (!) 180/102 (!) 190/100   BP Location: Left arm    Patient Position: Sitting    Cuff Size: Adult    Pulse: 65    Weight: 82 6 kg (182 lb)    Height: 5' 6" (1 676 m)      Body mass index is 29 38 kg/m²      Physical Exam  Constitutional:       General: She is not in acute distress  Appearance: Normal appearance  She is well-developed  HENT:      Head: Normocephalic and atraumatic  Nose: Nose normal       Mouth/Throat:      Mouth: Mucous membranes are moist    Eyes:      General: No scleral icterus  Conjunctiva/sclera: Conjunctivae normal       Pupils: Pupils are equal, round, and reactive to light  Neck:      Musculoskeletal: Neck supple  Thyroid: No thyromegaly  Vascular: No JVD  Cardiovascular:      Rate and Rhythm: Normal rate and regular rhythm  Heart sounds: Normal heart sounds  No murmur  No friction rub  Pulmonary:      Effort: Pulmonary effort is normal  No respiratory distress  Breath sounds: Normal breath sounds  No wheezing or rales  Abdominal:      General: Bowel sounds are normal  There is no distension  Palpations: Abdomen is soft  Tenderness: There is no abdominal tenderness  Musculoskeletal:         General: No deformity  Right lower leg: No edema  Left lower leg: No edema  Skin:     General: Skin is warm and dry  Findings: No rash  Neurological:      Mental Status: She is alert  She is disoriented  Comments: Oriented x1   Psychiatric:         Mood and Affect: Mood normal          Behavior: Behavior normal          Thought Content: Thought content normal          Lab Results:   Results for orders placed or performed in visit on 77/20/86   Basic metabolic panel   Result Value Ref Range    SODIUM 146     POTASSIUM 4 5     CHLORIDE 114     CO2 25     BUN 12     CREATININE 0 84     Glucose 96     EXTERNAL CALCIUM 8 9     ANION GAP 7     eGFR Non-Afr   American 67     eGFR  78      Lab Results:   Results from last 7 days   Lab Units 05/20/21   POTASSIUM  4 5   CHLORIDE  114   CO2  25   BUN  12   CREATININE  0 84   CALCIUM  8 9       Laboratory Results:  Lab Results   Component Value Date    WBC 11 38 (H) 01/02/2021    HGB 13 3 01/02/2021    HCT 41 8 01/02/2021    MCV 86 01/02/2021  (H) 01/02/2021     Lab Results   Component Value Date    SODIUM 146 05/20/2021    K 4 5 05/20/2021     05/20/2021    CO2 25 05/20/2021    BUN 12 05/20/2021    CREATININE 0 84 05/20/2021    GLUC 96 05/20/2021    CALCIUM 8 9 05/20/2021     Lab Results   Component Value Date    CALCIUM 8 9 05/20/2021     No results found for: LABPROT  [unfilled]  Lab Results   Component Value Date    CALCIUM 8 9 05/20/2021     [unfilled]

## 2021-08-13 ENCOUNTER — TELEPHONE (OUTPATIENT)
Dept: NEPHROLOGY | Facility: CLINIC | Age: 81
End: 2021-08-13

## 2021-08-13 NOTE — TELEPHONE ENCOUNTER
No BMP done, only urine studies  I spoke with Brock Perez from [facility], states she will have patient get BMP prior to follow up on 08/20/2021  Thank you

## 2021-08-20 ENCOUNTER — OFFICE VISIT (OUTPATIENT)
Dept: NEPHROLOGY | Facility: CLINIC | Age: 81
End: 2021-08-20
Payer: MEDICARE

## 2021-08-20 VITALS — DIASTOLIC BLOOD PRESSURE: 82 MMHG | HEART RATE: 62 BPM | SYSTOLIC BLOOD PRESSURE: 134 MMHG

## 2021-08-20 DIAGNOSIS — I10 ESSENTIAL HYPERTENSION: Primary | ICD-10-CM

## 2021-08-20 DIAGNOSIS — E87.0 HYPERNATREMIA: ICD-10-CM

## 2021-08-20 DIAGNOSIS — R80.9 PROTEINURIA, UNSPECIFIED TYPE: ICD-10-CM

## 2021-08-20 PROCEDURE — 99213 OFFICE O/P EST LOW 20 MIN: CPT | Performed by: INTERNAL MEDICINE

## 2021-08-20 NOTE — PATIENT INSTRUCTIONS
Primary Hypertension:   -Current medication:  Losartan 100 mg daily and amlodipine 10 mg daily  -Current blood pressure:  well controlled and is at goal  -Plan:    ? Continue current medications  Continue to monitor blood pressure  ? Recommend monitoring of blood pressure twice daily at nursing  Goal blood pressure less than 140/80 considering her age  ? Follow-up in 6 months with labs  -Recommend 2 g sodium diet      -Recommend daily exercise and weight loss  -Discussed home monitoring of BP and maintaining a log of blood pressure   -Recommend goal BP less than 130/80    -last sodium level was normal, stressed on oral hydration    Follow-up in 6 months with repeat labs

## 2021-08-20 NOTE — PROGRESS NOTES
NEPHROLOGY OUTPATIENT PROGRESS NOTE   Dylan Mckeon 80 y o  female MRN: [id number]  DATE: 8/20/2021  Reason for visit:   Chief Complaint   Patient presents with    Follow-up    Hypertension       ASSESSMENT and PLAN:  Primary Hypertension:   -Current medication:  Losartan 100 mg daily and amlodipine 10 mg daily  -Current blood pressure:  well controlled and is at goal  -Plan:    ? Continue current medications  Continue to monitor blood pressure  ? Recommend monitoring of blood pressure twice daily at nursing  Goal blood pressure less than 140/80 considering her age  ? Follow-up in 6 months with labs  -Recommend 2 g sodium diet  -Recommend daily exercise and weight loss  -Discussed home monitoring of BP and maintaining a log of blood pressure   -Recommend goal BP less than 130/80  Proteinuria, unspecified  -likely due to hypertensive nephrosclerosis  -upc ratio was 0 19  - renal function is stable  -blood pressure is currently at goal   Continue losartan at current dose, continue to monitor       Hypernatremia  - likely due to poor oral intake  - had hypernatremia during hospital admission in December 2020 requiring D5 water  - sodium level has improved with better oral intake, last sodium level was 144, continue to monitor, recommend increasing oral water intake  - follow-up in 6 months with repeat labs     Alzheimer's dementia  -continue follow-up and management per PCP      H/o LARA in Dec 2020 in the setting of COVID-19 infection and poor oral intake: resolved  Renal function now stable at cr 0 8   last blood work showed creatinine 0 78  Patient Instructions   Primary Hypertension:   -Current medication:  Losartan 100 mg daily and amlodipine 10 mg daily  -Current blood pressure:  well controlled and is at goal  -Plan:    ? Continue current medications  Continue to monitor blood pressure  ? Recommend monitoring of blood pressure twice daily at nursing    Goal blood pressure less than 140/80 considering her age  ? Follow-up in 6 months with labs  -Recommend 2 g sodium diet  -Recommend daily exercise and weight loss  -Discussed home monitoring of BP and maintaining a log of blood pressure   -Recommend goal BP less than 130/80    -last sodium level was normal, stressed on oral hydration    Follow-up in 6 months with repeat labs    Diagnoses and all orders for this visit:    Essential hypertension  -     Basic metabolic panel; Future  -     Protein / creatinine ratio, urine; Future    Proteinuria, unspecified type  -     Protein / creatinine ratio, urine; Future    Hypernatremia  -     Basic metabolic panel; Future            SUBJECTIVE / HPI:  Alvarez Jasso is a 80 y o   female with history of hypertension was seen at [facility] in December 2020 when she was admitted with COVID-19 infection  She had acute kidney injury and hypernatremia at that time, admission creatinine was 4 2 likely due to ATN/volume depletion in the setting of COVID-19 sepsis and rhabdomyolysis with CK level elevated to 1600  Renal function improved to creatinine 1 07 at the time of discharge  Pt residing at [facility] [place] from media section reviewed   Reviewed blood work from [facility] from August 16, 2021, renal function is stable at creatinine 0 78 and the sodium is now normal at 144, potassium is 4 9, bicarb 24, EGFR more than 60  UA positive for protein and nitrite but no UTI symptoms   Urine culture from August 5th was positive for E coli, not sure if she was treated with antibiotics  Patient not sure but currently denies any UTI symptoms     On previous blood work from 08/02, sodium was elevated to 146 but now improving  Upc ratio was 0 19     No new complaints  Not able to provide much information due to dementia  REVIEW OF SYSTEMS:    Review of Systems   Constitutional: Negative for activity change, appetite change, chills, diaphoresis, fatigue and fever     HENT: Negative for congestion, facial swelling and nosebleeds  Eyes: Negative for pain and visual disturbance  Respiratory: Negative for cough, chest tightness and shortness of breath  Cardiovascular: Negative for chest pain and palpitations  Gastrointestinal: Negative for abdominal distention, abdominal pain, diarrhea, nausea and vomiting  Genitourinary: Negative for difficulty urinating, dysuria, flank pain, frequency and hematuria  Musculoskeletal: Negative for arthralgias, back pain and joint swelling  Skin: Negative for rash  Neurological: Negative for dizziness, seizures, syncope, weakness and headaches  Psychiatric/Behavioral: Negative for agitation and confusion  The patient is not nervous/anxious  More than 10 point review of systems were obtained and discussed in length with the patient  Complete review of systems were negative / unremarkable except mentioned above  PAST MEDICAL HISTORY:  Past Medical History:   Diagnosis Date    Alzheimer's disease ([address] )     Hypertension     UTI (urinary tract infection) 12/25/2020       PAST SURGICAL HISTORY:  History reviewed  No pertinent surgical history  SOCIAL HISTORY:  Social History     Substance and Sexual Activity   Alcohol Use Not Currently     Social History     Substance and Sexual Activity   Drug Use Not Currently     Social History     Tobacco Use   Smoking Status Never Smoker   Smokeless Tobacco Never Used       FAMILY HISTORY:  History reviewed  No pertinent family history      MEDICATIONS:    Current Outpatient Medications:     acetaminophen (TYLENOL) 325 mg tablet, Take 2 tablets (650 mg total) by mouth every 6 (six) hours as needed for mild pain, Disp: 30 tablet, Rfl: 0    amLODIPine (NORVASC) 10 mg tablet, Take 1 tablet (10 mg total) by mouth daily, Disp: 90 tablet, Rfl: 3    famotidine (PEPCID) 20 mg tablet, Take 1 tablet (20 mg total) by mouth daily, Disp: 30 tablet, Rfl: 0    loperamide (IMODIUM) 2 mg capsule, Take 2 mg by mouth 2 (two) times a day as needed for diarrhea, Disp: , Rfl:     losartan (COZAAR) 100 MG tablet, Take 100 mg by mouth daily, Disp: , Rfl:     omeprazole (PriLOSEC) 20 mg delayed release capsule, Take 20 mg by mouth daily, Disp: , Rfl:     apixaban (ELIQUIS) 2 5 mg, Take 1 tablet (2 5 mg total) by mouth 2 (two) times a day (Patient not taking: Reported on 5/25/2021), Disp: 20 tablet, Rfl: 0      PHYSICAL EXAM:  Vitals:    08/20/21 1445   BP: 134/82   BP Location: Left arm   Patient Position: Sitting   Cuff Size: Standard   Pulse: 62     There is no height or weight on file to calculate BMI  Physical Exam  Constitutional:       General: She is not in acute distress  Appearance: Normal appearance  She is well-developed  HENT:      Head: Normocephalic and atraumatic  Nose: Nose normal       Mouth/Throat:      Mouth: Mucous membranes are moist    Eyes:      General: No scleral icterus  Conjunctiva/sclera: Conjunctivae normal       Pupils: Pupils are equal, round, and reactive to light  Neck:      Thyroid: No thyromegaly  Vascular: No JVD  Cardiovascular:      Rate and Rhythm: Normal rate and regular rhythm  Heart sounds: Normal heart sounds  No murmur heard  No friction rub  Pulmonary:      Effort: Pulmonary effort is normal  No respiratory distress  Breath sounds: Normal breath sounds  No wheezing or rales  Abdominal:      General: Bowel sounds are normal  There is no distension  Palpations: Abdomen is soft  Tenderness: There is no abdominal tenderness  Musculoskeletal:         General: No deformity  Cervical back: Neck supple  Right lower leg: No edema  Left lower leg: No edema  Skin:     General: Skin is warm and dry  Findings: No rash  Neurological:      General: No focal deficit present  Mental Status: She is alert  Mental status is at baseline        Comments: Not oriented to time   Psychiatric:         Mood and Affect: Mood normal          Behavior: Behavior normal       Comments: dementia         Lab Results:   Results for orders placed or performed in visit on 08/02/21   Protein / creatinine ratio, urine   Result Value Ref Range    PROTEIN UA 26 1     EXT Creatinine Urine 135 0     EXTERNAL Ur Prot/Creat Ratio 0 19      Reviewed labs from 8/16 from scan from media section

## 2022-02-22 ENCOUNTER — TELEPHONE (OUTPATIENT)
Dept: NEPHROLOGY | Facility: CLINIC | Age: 82
End: 2022-02-22

## 2022-02-22 NOTE — TELEPHONE ENCOUNTER
COVID Screening   When was screening done? During Appt Reminder Call    Does patient have any symptoms? no    Was patient told to let us know if they develop symptoms? yes    Patient voiced understanding   Appointment Confirmation   Person confirmed appointment with  If not patient, name of the person gregg [address]    Date and time of appointment 2/23 10:00    Patient acknowledged and will be at appointment? yes    Did you advise the patient that they will need a urine sample if they are a new patient?  N/A    Did you advise the patient to bring their current medications for verification? (including any OTC) Yes    Additional Information

## 2022-02-23 ENCOUNTER — OFFICE VISIT (OUTPATIENT)
Dept: NEPHROLOGY | Facility: CLINIC | Age: 82
End: 2022-02-23
Payer: MEDICARE

## 2022-02-23 VITALS — HEIGHT: 66 IN | DIASTOLIC BLOOD PRESSURE: 84 MMHG | SYSTOLIC BLOOD PRESSURE: 136 MMHG | BODY MASS INDEX: 29.38 KG/M2

## 2022-02-23 DIAGNOSIS — R80.9 PROTEINURIA, UNSPECIFIED TYPE: ICD-10-CM

## 2022-02-23 DIAGNOSIS — I10 ESSENTIAL HYPERTENSION: Primary | ICD-10-CM

## 2022-02-23 PROCEDURE — 99213 OFFICE O/P EST LOW 20 MIN: CPT | Performed by: INTERNAL MEDICINE

## 2022-02-23 RX ORDER — SIMVASTATIN 20 MG
20 TABLET ORAL
COMMUNITY

## 2022-02-23 NOTE — PROGRESS NOTES
NEPHROLOGY OUTPATIENT PROGRESS NOTE   Alanna Mercado 80 y o  female MRN: [id number]  DATE: 2/23/2022  Reason for visit:   Chief Complaint   Patient presents with    Follow-up     HTN       ASSESSMENT and PLAN:  Primary Hypertension:   -Current medication:  Losartan 100 mg daily and amlodipine 10 mg daily  -Current blood pressure:  BP stable and at goal  -Plan:    ? Continue same medications  ? Recommend monitoring of blood pressure daily at nursing facility and to send to log during next office visit   Goal blood pressure less than 140/80 considering her age  ? Follow-up in 6 months with labs  ? Okay to follow up with [facility]  -Recommend 2 g sodium diet     -Recommend daily exercise and weight loss  -Discussed home monitoring of BP and maintaining a log of blood pressure   -Recommend goal BP less than 130/80       Proteinuria, unspecified  -likely due to hypertensive nephrosclerosis  -upc ratio improving to 0 15 from previous level of 0 19  - renal function is stable, last creatinine was 0 74 from 02/09/2022  -blood pressure is stable  Continue losartan at current dose, continue to monitor  Hyperkalemia:  Potassium was elevated to 5 8 on blood work from 02/08 but resolved on repeat blood work next day on 02/09  Wonder if the sample was hemolyzed or not collected properly  Continue to monitor  Recommend low-potassium diet on top of current diet       Hypernatremia resolved  - had hypernatremia during hospital admission in December 2020 requiring D5 water  - sodium level currently stable at 144 mEq/liter     Alzheimer's dementia  -continue follow-up and management per PCP      H/o LARA in Dec 2020 in the setting of COVID-19 infection and poor oral intake: resolved  Renal function now stable at cr 0 7- 0 8       Called her son to discuss but not able to reach on the phone     Patient Instructions   Renal function stable    Low-potassium diet recommended    BP stable    Follow-up in 6-7 months with repeat blood work and urine test    Diagnoses and all orders for this visit:    Essential hypertension  -     Basic metabolic panel; Future  -     Protein / creatinine ratio, urine; Future  -     Urinalysis with microscopic; Future    Proteinuria, unspecified type  -     Protein / creatinine ratio, urine; Future  -     Urinalysis with microscopic; Future    Other orders  -     simvastatin (ZOCOR) 20 mg tablet; Take 20 mg by mouth daily at bedtime            SUBJECTIVE / HPI:  Fer Ma is a 80 y o   female with history of hypertension was seen at [facility] in December 2020 when she was admitted with COVID-19 infection   She had acute kidney injury and hypernatremia at that time, admission creatinine was 4 2 likely due to ATN/volume depletion in the setting of COVID-19 sepsis and rhabdomyolysis with CK level elevated to 1600  Renal function improved to creatinine 1 07 at the time of discharge  Blood work from 02/09/2022:  Creatinine 0 74  Potassium 4 2, EGFR 77, upc ratio was 0 15     No new complaints  Has dementia and not able to provide much information  She is at [address]   REVIEW OF SYSTEMS:    Review of Systems   Constitutional: Negative for activity change, appetite change, chills, diaphoresis, fatigue and fever  HENT: Negative for congestion, facial swelling and nosebleeds  Eyes: Negative for pain and visual disturbance  Respiratory: Negative for cough, chest tightness and shortness of breath  Cardiovascular: Negative for chest pain and palpitations  Gastrointestinal: Negative for abdominal distention, abdominal pain, diarrhea, nausea and vomiting  Genitourinary: Negative for difficulty urinating, dysuria, flank pain, frequency and hematuria  Musculoskeletal: Negative for arthralgias, back pain and joint swelling  Skin: Negative for rash  Neurological: Negative for dizziness, seizures, syncope, weakness and headaches     Psychiatric/Behavioral: Negative for agitation and confusion  The patient is not nervous/anxious  More than 10 point review of systems were obtained and discussed in length with the patient  Complete review of systems were negative / unremarkable except mentioned above  PAST MEDICAL HISTORY:  Past Medical History:   Diagnosis Date    [facility]'s disease ([facility] [address] )     Hypertension     UTI (urinary tract infection) 12/25/2020       PAST SURGICAL HISTORY:  History reviewed  No pertinent surgical history  SOCIAL HISTORY:  Social History     Substance and Sexual Activity   Alcohol Use Not Currently     Social History     Substance and Sexual Activity   Drug Use Not Currently     Social History     Tobacco Use   Smoking Status Never Smoker   Smokeless Tobacco Never Used       FAMILY HISTORY:  History reviewed  No pertinent family history      MEDICATIONS:    Current Outpatient Medications:     acetaminophen (TYLENOL) 325 mg tablet, Take 2 tablets (650 mg total) by mouth every 6 (six) hours as needed for mild pain, Disp: 30 tablet, Rfl: 0    amLODIPine (NORVASC) 10 mg tablet, Take 1 tablet (10 mg total) by mouth daily, Disp: 90 tablet, Rfl: 3    famotidine (PEPCID) 20 mg tablet, Take 1 tablet (20 mg total) by mouth daily, Disp: 30 tablet, Rfl: 0    losartan (COZAAR) 100 MG tablet, Take 100 mg by mouth daily, Disp: , Rfl:     omeprazole (PriLOSEC) 20 mg delayed release capsule, Take 20 mg by mouth daily, Disp: , Rfl:     simvastatin (ZOCOR) 20 mg tablet, Take 20 mg by mouth daily at bedtime, Disp: , Rfl:     apixaban (ELIQUIS) 2 5 mg, Take 1 tablet (2 5 mg total) by mouth 2 (two) times a day (Patient not taking: Reported on 5/25/2021), Disp: 20 tablet, Rfl: 0    loperamide (IMODIUM) 2 mg capsule, Take 2 mg by mouth 2 (two) times a day as needed for diarrhea (Patient not taking: Reported on 2/23/2022 ), Disp: , Rfl:       PHYSICAL EXAM:  Vitals:    02/23/22 0949   BP: 136/84   BP Location: Left arm   Patient Position: Sitting   Cuff Size: Adult   Height: 5' 6" (1 676 m)     Body mass index is 29 38 kg/m²  Physical Exam  Constitutional:       General: She is not in acute distress  Appearance: Normal appearance  She is well-developed  HENT:      Head: Normocephalic and atraumatic  Nose: Nose normal       Mouth/Throat:      Mouth: Mucous membranes are moist    Eyes:      General: No scleral icterus  Conjunctiva/sclera: Conjunctivae normal       Pupils: Pupils are equal, round, and reactive to light  Neck:      Thyroid: No thyromegaly  Vascular: No JVD  Cardiovascular:      Rate and Rhythm: Normal rate and regular rhythm  Heart sounds: Normal heart sounds  No murmur heard  No friction rub  Pulmonary:      Effort: Pulmonary effort is normal  No respiratory distress  Breath sounds: Normal breath sounds  No wheezing or rales  Abdominal:      General: Bowel sounds are normal  There is no distension  Palpations: Abdomen is soft  Tenderness: There is no abdominal tenderness  Musculoskeletal:         General: No deformity  Cervical back: Neck supple  Right lower leg: No edema  Left lower leg: No edema  Skin:     General: Skin is warm and dry  Findings: No rash  Neurological:      Mental Status: She is alert  Mental status is at baseline  She is disoriented  Comments: Oriented to self only   Psychiatric:         Mood and Affect: Mood normal          Behavior: Behavior normal          Thought Content:  Thought content normal          Lab Results:   Results for orders placed or performed in visit on 02/11/22   Protein / creatinine ratio, urine   Result Value Ref Range    PROTEIN UA 16 5     EXT Creatinine Urine 109 0     EXTERNAL Ur Prot/Creat Ratio 0 15      Reviewed scanned lab results from feb 2022

## 2022-02-23 NOTE — LETTER
February 23, 2022     Melissa Mccabe MD  [address]  [address]  [address] [place]    Patient: Noemy Mayer   YOB: 1940   Date of Visit: 2/23/2022       Dear Dr Jefferson Curtis:    Thank you for referring Noemy Mayer to me for evaluation  Below are my notes for this consultation  If you have questions, please do not hesitate to call me  I look forward to following your patient along with you  Sincerely,        Imelda Mota MD        CC: Mercy Peerz, Paulette Smith MD  2/23/2022 10:23 AM  Sign when Signing Visit  [address] [facility] [address] 80 y o  female MRN: [id number]  DATE: 2/23/2022  Reason for visit:   Chief Complaint   Patient presents with    Follow-up     HTN       ASSESSMENT and PLAN:  Primary Hypertension:   -Current medication:  Losartan 100 mg daily and amlodipine 10 mg daily  -Current blood pressure:  BP stable and at goal  -Plan:    ? Continue same medications  ? Recommend monitoring of blood pressure daily at nursing facility and to send to log during next office visit   Goal blood pressure less than 140/80 considering her age  ? Follow-up in 6 months with labs  ? Okay to follow up with [facility]  -Recommend 2 g sodium diet     -Recommend daily exercise and weight loss  -Discussed home monitoring of BP and maintaining a log of blood pressure   -Recommend goal BP less than 130/80       Proteinuria, unspecified  -likely due to hypertensive nephrosclerosis  -upc ratio improving to 0 15 from previous level of 0 19  - renal function is stable, last creatinine was 0 74 from 02/09/2022  -blood pressure is stable  Continue losartan at current dose, continue to monitor  Hyperkalemia:  Potassium was elevated to 5 8 on blood work from 02/08 but resolved on repeat blood work next day on 02/09  Wonder if the sample was hemolyzed or not collected properly  Continue to monitor    Recommend low-potassium diet on top of current diet       Hypernatremia resolved  - had hypernatremia during hospital admission in December 2020 requiring D5 water  - sodium level currently stable at 144 mEq/liter     Alzheimer's dementia  -continue follow-up and management per PCP      H/o LARA in Dec 2020 in the setting of COVID-19 infection and poor oral intake: resolved  Renal function now stable at cr 0 7- 0 8       Called her son to discuss but not able to reach on the phone     Patient Instructions   Renal function stable  Low-potassium diet recommended    BP stable    Follow-up in 6-7 months with repeat blood work and urine test    Diagnoses and all orders for this visit:    Essential hypertension  -     Basic metabolic panel; Future  -     Protein / creatinine ratio, urine; Future  -     Urinalysis with microscopic; Future    Proteinuria, unspecified type  -     Protein / creatinine ratio, urine; Future  -     Urinalysis with microscopic; Future    Other orders  -     simvastatin (ZOCOR) 20 mg tablet; Take 20 mg by mouth daily at bedtime            SUBJECTIVE / HPI:  Mike Lincoln is a 80 y o   female with history of hypertension was seen at [facility] in December 2020 when she was admitted with COVID-19 infection   She had acute kidney injury and hypernatremia at that time, admission creatinine was 4 2 likely due to ATN/volume depletion in the setting of COVID-19 sepsis and rhabdomyolysis with CK level elevated to 1600  Renal function improved to creatinine 1 07 at the time of discharge  Blood work from 02/09/2022:  Creatinine 0 74  Potassium 4 2, EGFR 77, upc ratio was 0 15     No new complaints  Has dementia and not able to provide much information  She is at [address]   REVIEW OF SYSTEMS:    Review of Systems   Constitutional: Negative for activity change, appetite change, chills, diaphoresis, fatigue and fever  HENT: Negative for congestion, facial swelling and nosebleeds      Eyes: Negative for pain and visual disturbance  Respiratory: Negative for cough, chest tightness and shortness of breath  Cardiovascular: Negative for chest pain and palpitations  Gastrointestinal: Negative for abdominal distention, abdominal pain, diarrhea, nausea and vomiting  Genitourinary: Negative for difficulty urinating, dysuria, flank pain, frequency and hematuria  Musculoskeletal: Negative for arthralgias, back pain and joint swelling  Skin: Negative for rash  Neurological: Negative for dizziness, seizures, syncope, weakness and headaches  Psychiatric/Behavioral: Negative for agitation and confusion  The patient is not nervous/anxious  More than 10 point review of systems were obtained and discussed in length with the patient  Complete review of systems were negative / unremarkable except mentioned above  PAST MEDICAL HISTORY:  Past Medical History:   Diagnosis Date    [facility]'s disease ([facility] [address] [facility]     Hypertension     UTI (urinary tract infection) 12/25/2020       PAST SURGICAL HISTORY:  History reviewed  No pertinent surgical history  SOCIAL HISTORY:  Social History     Substance and Sexual Activity   Alcohol Use Not Currently     Social History     Substance and Sexual Activity   Drug Use Not Currently     Social History     Tobacco Use   Smoking Status Never Smoker   Smokeless Tobacco Never Used       FAMILY HISTORY:  History reviewed  No pertinent family history      MEDICATIONS:    Current Outpatient Medications:     acetaminophen (TYLENOL) 325 mg tablet, Take 2 tablets (650 mg total) by mouth every 6 (six) hours as needed for mild pain, Disp: 30 tablet, Rfl: 0    amLODIPine (NORVASC) 10 mg tablet, Take 1 tablet (10 mg total) by mouth daily, Disp: 90 tablet, Rfl: 3    famotidine (PEPCID) 20 mg tablet, Take 1 tablet (20 mg total) by mouth daily, Disp: 30 tablet, Rfl: 0    losartan (COZAAR) 100 MG tablet, Take 100 mg by mouth daily, Disp: , Rfl:     omeprazole (PriLOSEC) 20 mg delayed release capsule, Take 20 mg by mouth daily, Disp: , Rfl:     simvastatin (ZOCOR) 20 mg tablet, Take 20 mg by mouth daily at bedtime, Disp: , Rfl:     apixaban (ELIQUIS) 2 5 mg, Take 1 tablet (2 5 mg total) by mouth 2 (two) times a day (Patient not taking: Reported on 5/25/2021), Disp: 20 tablet, Rfl: 0    loperamide (IMODIUM) 2 mg capsule, Take 2 mg by mouth 2 (two) times a day as needed for diarrhea (Patient not taking: Reported on 2/23/2022 ), Disp: , Rfl:       PHYSICAL EXAM:  Vitals:    02/23/22 0949   BP: 136/84   BP Location: Left arm   Patient Position: Sitting   Cuff Size: Adult   Height: 5' 6" (1 676 m)     Body mass index is 29 38 kg/m²  Physical Exam  Constitutional:       General: She is not in acute distress  Appearance: Normal appearance  She is well-developed  HENT:      Head: Normocephalic and atraumatic  Nose: Nose normal       Mouth/Throat:      Mouth: Mucous membranes are moist    Eyes:      General: No scleral icterus  Conjunctiva/sclera: Conjunctivae normal       Pupils: Pupils are equal, round, and reactive to light  Neck:      Thyroid: No thyromegaly  Vascular: No JVD  Cardiovascular:      Rate and Rhythm: Normal rate and regular rhythm  Heart sounds: Normal heart sounds  No murmur heard  No friction rub  Pulmonary:      Effort: Pulmonary effort is normal  No respiratory distress  Breath sounds: Normal breath sounds  No wheezing or rales  Abdominal:      General: Bowel sounds are normal  There is no distension  Palpations: Abdomen is soft  Tenderness: There is no abdominal tenderness  Musculoskeletal:         General: No deformity  Cervical back: Neck supple  Right lower leg: No edema  Left lower leg: No edema  Skin:     General: Skin is warm and dry  Findings: No rash  Neurological:      Mental Status: She is alert  Mental status is at baseline  She is disoriented        Comments: Oriented to self only   Psychiatric:         Mood and Affect: Mood normal          Behavior: Behavior normal          Thought Content:  Thought content normal          Lab Results:   Results for orders placed or performed in visit on 02/11/22   Protein / creatinine ratio, urine   Result Value Ref Range    PROTEIN UA 16 5     EXT Creatinine Urine 109 0     EXTERNAL Ur Prot/Creat Ratio 0 15      Reviewed scanned lab results from feb 2022

## 2022-02-23 NOTE — PATIENT INSTRUCTIONS
Renal function stable    Low-potassium diet recommended    BP stable    Follow-up in 6-7 months with repeat blood work and urine test

## 2022-08-25 ENCOUNTER — TELEPHONE (OUTPATIENT)
Dept: NEPHROLOGY | Facility: CLINIC | Age: 82
End: 2022-08-25

## 2022-08-25 NOTE — TELEPHONE ENCOUNTER
Spoke with nursing at [address] [place] [address] reminding them to do lab work before her appt on 9/1  She expressed understanding and lab orders were faxed over to [phone]

## 2022-10-07 ENCOUNTER — TELEPHONE (OUTPATIENT)
Dept: NEPHROLOGY | Facility: CLINIC | Age: 82
End: 2022-10-07

## 2022-10-07 NOTE — TELEPHONE ENCOUNTER
Called [facility] to remind them of lab work and patient was discharged as of August LM for patient's guardian, Rocky Hameed, reminding them to go for lab work  Asked them to call back if they have any questions

## 2022-10-12 PROBLEM — J12.82 PNEUMONIA DUE TO COVID-19 VIRUS: Status: RESOLVED | Noted: 2020-12-24 | Resolved: 2022-10-12

## 2022-10-12 PROBLEM — U07.1 PNEUMONIA DUE TO COVID-19 VIRUS: Status: RESOLVED | Noted: 2020-12-24 | Resolved: 2022-10-12
